# Patient Record
Sex: FEMALE | Race: BLACK OR AFRICAN AMERICAN | NOT HISPANIC OR LATINO | URBAN - METROPOLITAN AREA
[De-identification: names, ages, dates, MRNs, and addresses within clinical notes are randomized per-mention and may not be internally consistent; named-entity substitution may affect disease eponyms.]

---

## 2017-03-01 ENCOUNTER — INPATIENT (INPATIENT)
Facility: HOSPITAL | Age: 82
LOS: 2 days | Discharge: HOME | End: 2017-03-04
Attending: HOSPITALIST | Admitting: HOSPITALIST
Payer: MEDICARE

## 2017-03-01 VITALS
TEMPERATURE: 98 F | SYSTOLIC BLOOD PRESSURE: 151 MMHG | DIASTOLIC BLOOD PRESSURE: 70 MMHG | OXYGEN SATURATION: 99 % | HEART RATE: 54 BPM | RESPIRATION RATE: 18 BRPM

## 2017-03-01 DIAGNOSIS — R53.1 WEAKNESS: ICD-10-CM

## 2017-03-01 DIAGNOSIS — F03.90 UNSPECIFIED DEMENTIA, UNSPECIFIED SEVERITY, WITHOUT BEHAVIORAL DISTURBANCE, PSYCHOTIC DISTURBANCE, MOOD DISTURBANCE, AND ANXIETY: ICD-10-CM

## 2017-03-01 DIAGNOSIS — I63.9 CEREBRAL INFARCTION, UNSPECIFIED: ICD-10-CM

## 2017-03-01 DIAGNOSIS — Z29.9 ENCOUNTER FOR PROPHYLACTIC MEASURES, UNSPECIFIED: ICD-10-CM

## 2017-03-01 DIAGNOSIS — I10 ESSENTIAL (PRIMARY) HYPERTENSION: ICD-10-CM

## 2017-03-01 LAB
ALBUMIN SERPL ELPH-MCNC: 4.1 G/DL — SIGNIFICANT CHANGE UP (ref 3.3–5)
ALP SERPL-CCNC: 69 U/L — SIGNIFICANT CHANGE UP (ref 40–120)
ALT FLD-CCNC: 20 U/L — SIGNIFICANT CHANGE UP (ref 4–33)
APTT BLD: 32.7 SEC — SIGNIFICANT CHANGE UP (ref 27.5–37.4)
AST SERPL-CCNC: 36 U/L — HIGH (ref 4–32)
BASOPHILS # BLD AUTO: 0.02 K/UL — SIGNIFICANT CHANGE UP (ref 0–0.2)
BASOPHILS NFR BLD AUTO: 0.3 % — SIGNIFICANT CHANGE UP (ref 0–2)
BILIRUB SERPL-MCNC: 0.2 MG/DL — SIGNIFICANT CHANGE UP (ref 0.2–1.2)
BUN SERPL-MCNC: 10 MG/DL — SIGNIFICANT CHANGE UP (ref 7–23)
CALCIUM SERPL-MCNC: 9.3 MG/DL — SIGNIFICANT CHANGE UP (ref 8.4–10.5)
CHLORIDE SERPL-SCNC: 101 MMOL/L — SIGNIFICANT CHANGE UP (ref 98–107)
CK MB BLD-MCNC: 1.97 NG/ML — SIGNIFICANT CHANGE UP (ref 1–4.7)
CK SERPL-CCNC: 112 U/L — SIGNIFICANT CHANGE UP (ref 25–170)
CO2 SERPL-SCNC: 29 MMOL/L — SIGNIFICANT CHANGE UP (ref 22–31)
CREAT SERPL-MCNC: 0.62 MG/DL — SIGNIFICANT CHANGE UP (ref 0.5–1.3)
EOSINOPHIL # BLD AUTO: 0.14 K/UL — SIGNIFICANT CHANGE UP (ref 0–0.5)
EOSINOPHIL NFR BLD AUTO: 2.4 % — SIGNIFICANT CHANGE UP (ref 0–6)
GLUCOSE SERPL-MCNC: 94 MG/DL — SIGNIFICANT CHANGE UP (ref 70–99)
HCT VFR BLD CALC: 37.2 % — SIGNIFICANT CHANGE UP (ref 34.5–45)
HGB BLD-MCNC: 12.2 G/DL — SIGNIFICANT CHANGE UP (ref 11.5–15.5)
IMM GRANULOCYTES NFR BLD AUTO: 0.2 % — SIGNIFICANT CHANGE UP (ref 0–1.5)
INR BLD: 1.1 — SIGNIFICANT CHANGE UP (ref 0.87–1.18)
LYMPHOCYTES # BLD AUTO: 1.24 K/UL — SIGNIFICANT CHANGE UP (ref 1–3.3)
LYMPHOCYTES # BLD AUTO: 21.4 % — SIGNIFICANT CHANGE UP (ref 13–44)
MCHC RBC-ENTMCNC: 24.6 PG — LOW (ref 27–34)
MCHC RBC-ENTMCNC: 32.8 % — SIGNIFICANT CHANGE UP (ref 32–36)
MCV RBC AUTO: 75.2 FL — LOW (ref 80–100)
MONOCYTES # BLD AUTO: 0.33 K/UL — SIGNIFICANT CHANGE UP (ref 0–0.9)
MONOCYTES NFR BLD AUTO: 5.7 % — SIGNIFICANT CHANGE UP (ref 2–14)
NEUTROPHILS # BLD AUTO: 4.06 K/UL — SIGNIFICANT CHANGE UP (ref 1.8–7.4)
NEUTROPHILS NFR BLD AUTO: 70 % — SIGNIFICANT CHANGE UP (ref 43–77)
PLATELET # BLD AUTO: 163 K/UL — SIGNIFICANT CHANGE UP (ref 150–400)
PMV BLD: SIGNIFICANT CHANGE UP FL (ref 7–13)
POTASSIUM SERPL-MCNC: 5.4 MMOL/L — HIGH (ref 3.5–5.3)
POTASSIUM SERPL-SCNC: 5.4 MMOL/L — HIGH (ref 3.5–5.3)
PROT SERPL-MCNC: 7.7 G/DL — SIGNIFICANT CHANGE UP (ref 6–8.3)
PROTHROM AB SERPL-ACNC: 12.5 SEC — SIGNIFICANT CHANGE UP (ref 10–13.1)
RBC # BLD: 4.95 M/UL — SIGNIFICANT CHANGE UP (ref 3.8–5.2)
RBC # FLD: 17.7 % — HIGH (ref 10.3–14.5)
SODIUM SERPL-SCNC: 142 MMOL/L — SIGNIFICANT CHANGE UP (ref 135–145)
TROPONIN T SERPL-MCNC: < 0.06 NG/ML — SIGNIFICANT CHANGE UP (ref 0–0.06)
WBC # BLD: 5.8 K/UL — SIGNIFICANT CHANGE UP (ref 3.8–10.5)
WBC # FLD AUTO: 5.8 K/UL — SIGNIFICANT CHANGE UP (ref 3.8–10.5)

## 2017-03-01 PROCEDURE — 70450 CT HEAD/BRAIN W/O DYE: CPT | Mod: 26

## 2017-03-01 PROCEDURE — 71020: CPT | Mod: 26

## 2017-03-01 RX ORDER — MEMANTINE HYDROCHLORIDE 10 MG/1
10 TABLET ORAL DAILY
Qty: 0 | Refills: 0 | Status: DISCONTINUED | OUTPATIENT
Start: 2017-03-01 | End: 2017-03-04

## 2017-03-01 RX ORDER — ENOXAPARIN SODIUM 100 MG/ML
40 INJECTION SUBCUTANEOUS EVERY 24 HOURS
Qty: 0 | Refills: 0 | Status: DISCONTINUED | OUTPATIENT
Start: 2017-03-01 | End: 2017-03-04

## 2017-03-01 RX ORDER — PANTOPRAZOLE SODIUM 20 MG/1
40 TABLET, DELAYED RELEASE ORAL
Qty: 0 | Refills: 0 | Status: DISCONTINUED | OUTPATIENT
Start: 2017-03-01 | End: 2017-03-04

## 2017-03-01 RX ORDER — ACETAMINOPHEN 500 MG
650 TABLET ORAL EVERY 6 HOURS
Qty: 0 | Refills: 0 | Status: DISCONTINUED | OUTPATIENT
Start: 2017-03-01 | End: 2017-03-04

## 2017-03-01 RX ORDER — ASPIRIN/CALCIUM CARB/MAGNESIUM 324 MG
81 TABLET ORAL DAILY
Qty: 0 | Refills: 0 | Status: DISCONTINUED | OUTPATIENT
Start: 2017-03-01 | End: 2017-03-04

## 2017-03-01 RX ORDER — METOPROLOL TARTRATE 50 MG
100 TABLET ORAL DAILY
Qty: 0 | Refills: 0 | Status: DISCONTINUED | OUTPATIENT
Start: 2017-03-01 | End: 2017-03-04

## 2017-03-01 RX ORDER — FOLIC ACID 0.8 MG
0.4 TABLET ORAL DAILY
Qty: 0 | Refills: 0 | Status: DISCONTINUED | OUTPATIENT
Start: 2017-03-01 | End: 2017-03-04

## 2017-03-01 RX ORDER — UBIDECARENONE 100 MG
0 CAPSULE ORAL
Qty: 0 | Refills: 0 | COMMUNITY

## 2017-03-01 RX ORDER — ATORVASTATIN CALCIUM 80 MG/1
40 TABLET, FILM COATED ORAL AT BEDTIME
Qty: 0 | Refills: 0 | Status: DISCONTINUED | OUTPATIENT
Start: 2017-03-01 | End: 2017-03-04

## 2017-03-01 RX ORDER — LOSARTAN POTASSIUM 100 MG/1
50 TABLET, FILM COATED ORAL DAILY
Qty: 0 | Refills: 0 | Status: DISCONTINUED | OUTPATIENT
Start: 2017-03-01 | End: 2017-03-04

## 2017-03-01 RX ORDER — ESCITALOPRAM OXALATE 10 MG/1
10 TABLET, FILM COATED ORAL DAILY
Qty: 0 | Refills: 0 | Status: DISCONTINUED | OUTPATIENT
Start: 2017-03-01 | End: 2017-03-04

## 2017-03-01 RX ORDER — OXYBUTYNIN CHLORIDE 5 MG
5 TABLET ORAL DAILY
Qty: 0 | Refills: 0 | Status: DISCONTINUED | OUTPATIENT
Start: 2017-03-01 | End: 2017-03-04

## 2017-03-01 RX ORDER — SODIUM CHLORIDE 9 MG/ML
1000 INJECTION INTRAMUSCULAR; INTRAVENOUS; SUBCUTANEOUS ONCE
Qty: 0 | Refills: 0 | Status: COMPLETED | OUTPATIENT
Start: 2017-03-01 | End: 2017-03-01

## 2017-03-01 RX ADMIN — Medication 650 MILLIGRAM(S): at 23:04

## 2017-03-01 RX ADMIN — ATORVASTATIN CALCIUM 40 MILLIGRAM(S): 80 TABLET, FILM COATED ORAL at 23:02

## 2017-03-01 RX ADMIN — SODIUM CHLORIDE 1000 MILLILITER(S): 9 INJECTION INTRAMUSCULAR; INTRAVENOUS; SUBCUTANEOUS at 16:06

## 2017-03-01 RX ADMIN — LOSARTAN POTASSIUM 50 MILLIGRAM(S): 100 TABLET, FILM COATED ORAL at 23:04

## 2017-03-01 NOTE — ED PROVIDER NOTE - OBJECTIVE STATEMENT
82F with hx of HTN presents with generalized weakness. Pt woke up this morning feeling weak to get out of bed. Pt also felt lightheaded when she walked. Denies chest pain, short of breath, any focal deficits. Pt did not take her BP meds for the past few weeks because she was living in different houses; her  passed away recently and pt is visiting from [place] for the  arrangements. 82F with hx of HTN presents with generalized weakness 8 hours ago. Pt woke up this morning feeling weak to get out of bed. Pt also felt lightheaded when she walked. Denies chest pain, short of breath, any focal deficits. Pt did not take her BP meds for the past few weeks because she was living in different houses; her  passed away recently and pt is visiting from [place] for the  arrangements. 82F with hx of HTN presents with generalized weakness 8 hours ago. Pt woke up this morning feeling weak to get out of bed. Pt also felt lightheaded when she walked. Denies chest pain, short of breath, any focal deficits. Pt did not take her BP meds for the past few weeks because she was living in different houses; her  passed away recently and pt is visiting from [place] for the  arrangements.    Attendinyo female presents because this AM she felt dizzy.  Pt states that she was having difficulty walking this AM and felt unsteady on her feet.  Associated with diaphoresis.  Symptoms are getting better, but still felt unsteady when she walked to the bathroom here. 82F with hx of HTN presents with generalized weakness 8 hours ago. Pt woke up this morning feeling weak to get out of bed. Pt also felt lightheaded when she walked. Denies chest pain, short of breath, any focal deficits. Pt did not take her BP meds for the past few weeks because she was living in different houses; her  passed away recently and pt is visiting from [place] for the  arrangements..    Attendinyo female presents because this AM she felt dizzy.  Pt states that she was having difficulty walking this AM and felt unsteady on her feet.  Associated with diaphoresis.  Symptoms are getting better, but still felt unsteady when she walked to the bathroom here.

## 2017-03-01 NOTE — H&P ADULT. - HISTORY OF PRESENT ILLNESS
Self ambulating 82F with a history of L Breast Ca s/p mastectomy 2000, HTN, recently , staying in [place] with her son, non complaint with her medications for the past few weeks, went sleep fine on 2/28 and awoke @ 730am on 3/1 with dizziness, generalized weakness, Occipital HA, blurred vison, nausea. She got out of bed, did not feel steady on her feet and felt like she will fall. She did not fall or have a LOC, no vomit, chest pain, palpitations, SOB, diap[horesis, chills. She called her son. He called his wife and the wife went to the pt house and found the pt laying, awake in bed. The pt still felt dizzy. She was help up went to the bathroom, urinated, got dressed and was taken tot Self ambulating 82F with a history of L Breast Ca s/p mastectomy 2000, HTN, recently , staying in [place] with her son, non complaint with her medications for the past few weeks, went sleep fine on 2/28 and awoke @ 730am on 3/1 with dizziness, generalized weakness, Occipital HA, blurred vison, nausea. She got out of bed, did not feel steady on her feet and felt like she will fall. She did not fall or have a LOC, no vomit, chest pain, palpitations, SOB, diap[horesis, chills. She called her son. He called his wife and the wife went to the pt house and found the pt laying, awake in bed. The pt still felt dizzy. She was help up went to the bathroom, urinated, got dressed and was taken to the Ed.     In the ED, she had a CTH= No mass effect, hemorrhage or evidence of acute intracranial pathology. Self ambulating 82F with a history of L Breast Ca s/p mastectomy 2000, HTN, recently , staying in [place] with her son, non complaint with her medications for the past few weeks, went sleep fine on 2/28 and awoke @ 730am on 3/1 with dizziness, generalized weakness, Occipital HA, blurred vison, nausea. She got out of bed, did not feel steady on her feet and felt like she will fall. She did not fall or have a LOC, no vomit, chest pain, palpitations, SOB, diap[horesis, chills. She called her son. He called his wife and the wife went to the pt house and found the pt laying, awake in bed. The pt still felt dizzy. She was help up went to the bathroom, urinated, got dressed and was taken to the Ed.     In the ED, she had a CTH= No mass effect, hemorrhage or evidence of acute intracranial pathology.  She was seen by the neuro resident. Their consult and recommendations are  in the chart.   The pt symptoms have improved.  But she still with dizziness, generalized weakness, Occipital HA, blurred vision  Vitals =  / 91, HR = 60b/ min, RR= 16b/ min, SPO2= 98% ra, T= 98* 82-year-old female with HTN, recently , staying in [place] with her son, non complaint with her medications for the past few weeks, went to sleep 2/28 and awoke @ 730am on 3/1 with dizziness, generalized weakness, holocranial HA, blurred vison, nausea and diaphoresis. She got out of bed, did not feel steady on her feet and felt like she will fall. She did not fall or have a LOC, no vomiting, chest pain, palpitations, SOB or chills. She called her son. He called his wife and the wife went to the pt house and found the pt laying, awake in bed. The pt still felt dizzy. She was help up went to the bathroom, urinated, got dressed and was taken to the Ed.     In the ED, she had a CTH= No mass effect, hemorrhage or evidence of acute intracranial pathology.  She was seen by the neuro resident. Their consult and recommendations are  in the chart.   The pt symptoms have improved.  But she still with dizziness, generalized weakness, Occipital HA, blurred vision  Vitals =  / 91, HR = 60b/ min, RR= 16b/ min, SPO2= 98% ra, T= 98*

## 2017-03-01 NOTE — H&P ADULT. - FAMILY HISTORY
No pertinent family history in first degree relatives Mother  Still living? Unknown  Family history of diabetes mellitus (DM), Age at diagnosis: Age Unknown     Sibling  Still living? Unknown  Family history of diabetes mellitus (DM), Age at diagnosis: Age Unknown

## 2017-03-01 NOTE — ED ADULT TRIAGE NOTE - CHIEF COMPLAINT QUOTE
Pt states she woke up this morning feeling weak, with body aches, and profuse diaphoresis. Pt denies SOB, denies pain at this time, states the pain was in chest as well as rest of body this morning. Pt denies fever, denies recent illness. Pt here visiting from [place], didn't have her meds with her and didn't take them for a week, on meds for HTN.

## 2017-03-01 NOTE — H&P ADULT. - ATTENDING COMMENTS
Briefly, 82-year-old female with acromegaly, HTN, recent medication non-adherence for approx. 7 days, 1 week ago presenting from her son's house with dizziness, unsteady gait, lightheadedness, with associated chest pain and diaphoresis.  Patient currently without chest pain, still feeling dizzy upon standing. Will monitor on tele, check orthostatics, obtain MR imaging per neuro recommendations.  Clarify meds with family in AM as patient could not recall her meds.  Check TSH, lipid panel, A1c with AM labs.

## 2017-03-01 NOTE — H&P ADULT. - RADIOLOGY RESULTS AND INTERPRETATION
CT Head without acute path, 1cm meningioma   CXR clear lungs, elevated left hemidiaphragm, surgical clips in left axilla, tracheal deviation to the right

## 2017-03-01 NOTE — H&P ADULT. - GASTROINTESTINAL DETAILS
nontender/soft/no masses palpable nontender/no rigidity/no guarding/normal/soft/no masses palpable/bowel sounds normal

## 2017-03-01 NOTE — H&P ADULT. - ASSESSMENT
82F admitted for generalized weakness, HTN urgency r/o Stroke 82-year-old female with HTN, admitted for generalized weakness thought to be secondary to HTN urgency vs Stroke

## 2017-03-01 NOTE — ED PROVIDER NOTE - PHYSICAL EXAMINATION
CN2-12 intact. Slight droop lips on L. CN2-12 intact. Slight droop lips on L. No focal deficits or weakness.

## 2017-03-01 NOTE — H&P ADULT. - PMH
Dementia without behavioral disturbance, unspecified dementia type    HTN (hypertension) Acromegaly    Dementia without behavioral disturbance, unspecified dementia type    HTN (hypertension)

## 2017-03-01 NOTE — ED ADULT NURSE NOTE - OBJECTIVE STATEMENT
Pt received to rm 7, a&ox3, c/o generalized weakness, dizziness, with short episode of R sided chest pain at 730am this morning that resolved on own, denies SOB. Pt states, "I think it's because my   2 weeks ago." Denies pain or discomfort at present, respirations even and unlabored, sinus bradycardia on cm. Labs drawn and sent, IVL placed. Son/HCP by bedside. Awaiting MD beaver, pt in NAD at present.

## 2017-03-01 NOTE — H&P ADULT. - LAB RESULTS AND INTERPRETATION
Labs personally reviewed  UA negative  CE (-)x1  coags wnl  hyperkalemia in setting of hemolyzed specimen  microcytosis without anemia  mild transaminitis

## 2017-03-01 NOTE — H&P ADULT. - NEGATIVE ENMT SYMPTOMS
no nose bleeds/no nasal congestion/no recurrent cold sores/no tinnitus/no gum bleeding/no sinus symptoms/no abnormal taste sensation no sinus symptoms/no tinnitus/no hearing difficulty/no dysphagia/no nasal congestion

## 2017-03-01 NOTE — H&P ADULT. - MS EXT PE MLT D E PC
no pedal edema/no cyanosis/no clubbing no pedal edema/no cyanosis/prominent doughy hands/feet/no clubbing

## 2017-03-01 NOTE — H&P ADULT. - RS GEN PE MLT RESP DETAILS PC
good air movement/airway patent/respirations non-labored/breath sounds equal no rhonchi/no wheezes/normal/good air movement/respirations non-labored/breath sounds equal/airway patent/no rales/no intercostal retractions/clear to auscultation bilaterally

## 2017-03-01 NOTE — H&P ADULT. - PROBLEM SELECTOR PLAN 1
CM  F/U CE, FLP, A1C, TSH  F/U EKG, TTE Bubble study, MRI B, MRA H & N  F/U PT, OT, PM& R  Fall, Aspiration and Seizure precautions   Continue ASA and Plavix repeat set of CE  F/U CE, FLP, A1C, TSH  F/U TTE Bubble study, MRI B, MRA H & N  check orthostatics x1  F/U PT, OT, PM& R  Fall, Aspiration and Seizure precautions   Continue ASA

## 2017-03-01 NOTE — H&P ADULT. - NEGATIVE CARDIOVASCULAR SYMPTOMS
no chest pain/no orthopnea/no dyspnea on exertion/no palpitations no dyspnea on exertion/no chest pain/no peripheral edema/no palpitations/no orthopnea

## 2017-03-01 NOTE — H&P ADULT. - PSH
No significant past surgical history H/O small bowel obstruction    H/O total hysterectomy    History of left mastectomy

## 2017-03-02 DIAGNOSIS — Z87.19 PERSONAL HISTORY OF OTHER DISEASES OF THE DIGESTIVE SYSTEM: Chronic | ICD-10-CM

## 2017-03-02 DIAGNOSIS — Z90.12 ACQUIRED ABSENCE OF LEFT BREAST AND NIPPLE: Chronic | ICD-10-CM

## 2017-03-02 DIAGNOSIS — Z90.710 ACQUIRED ABSENCE OF BOTH CERVIX AND UTERUS: Chronic | ICD-10-CM

## 2017-03-02 LAB
APPEARANCE UR: CLEAR — SIGNIFICANT CHANGE UP
B PERT DNA SPEC QL NAA+PROBE: SIGNIFICANT CHANGE UP
BACTERIA # UR AUTO: HIGH
BILIRUB UR-MCNC: NEGATIVE — SIGNIFICANT CHANGE UP
BLOOD UR QL VISUAL: NEGATIVE — SIGNIFICANT CHANGE UP
BUN SERPL-MCNC: 9 MG/DL — SIGNIFICANT CHANGE UP (ref 7–23)
C PNEUM DNA SPEC QL NAA+PROBE: NOT DETECTED — SIGNIFICANT CHANGE UP
CALCIUM SERPL-MCNC: 9.5 MG/DL — SIGNIFICANT CHANGE UP (ref 8.4–10.5)
CHLORIDE SERPL-SCNC: 102 MMOL/L — SIGNIFICANT CHANGE UP (ref 98–107)
CHOLEST SERPL-MCNC: 181 MG/DL — SIGNIFICANT CHANGE UP (ref 120–199)
CK SERPL-CCNC: 62 U/L — SIGNIFICANT CHANGE UP (ref 25–170)
CO2 SERPL-SCNC: 32 MMOL/L — HIGH (ref 22–31)
COLOR SPEC: SIGNIFICANT CHANGE UP
CREAT SERPL-MCNC: 0.49 MG/DL — LOW (ref 0.5–1.3)
FLUAV H1 2009 PAND RNA SPEC QL NAA+PROBE: NOT DETECTED — SIGNIFICANT CHANGE UP
FLUAV H1 RNA SPEC QL NAA+PROBE: NOT DETECTED — SIGNIFICANT CHANGE UP
FLUAV H3 RNA SPEC QL NAA+PROBE: NOT DETECTED — SIGNIFICANT CHANGE UP
FLUAV SUBTYP SPEC NAA+PROBE: SIGNIFICANT CHANGE UP
FLUBV RNA SPEC QL NAA+PROBE: NOT DETECTED — SIGNIFICANT CHANGE UP
GLUCOSE SERPL-MCNC: 98 MG/DL — SIGNIFICANT CHANGE UP (ref 70–99)
GLUCOSE UR-MCNC: NEGATIVE — SIGNIFICANT CHANGE UP
HADV DNA SPEC QL NAA+PROBE: NOT DETECTED — SIGNIFICANT CHANGE UP
HBA1C BLD-MCNC: 5.3 % — SIGNIFICANT CHANGE UP (ref 4–5.6)
HCOV 229E RNA SPEC QL NAA+PROBE: NOT DETECTED — SIGNIFICANT CHANGE UP
HCOV HKU1 RNA SPEC QL NAA+PROBE: NOT DETECTED — SIGNIFICANT CHANGE UP
HCOV NL63 RNA SPEC QL NAA+PROBE: NOT DETECTED — SIGNIFICANT CHANGE UP
HCOV OC43 RNA SPEC QL NAA+PROBE: NOT DETECTED — SIGNIFICANT CHANGE UP
HCT VFR BLD CALC: 34.5 % — SIGNIFICANT CHANGE UP (ref 34.5–45)
HDLC SERPL-MCNC: 88 MG/DL — HIGH (ref 45–65)
HGB BLD-MCNC: 11.6 G/DL — SIGNIFICANT CHANGE UP (ref 11.5–15.5)
HMPV RNA SPEC QL NAA+PROBE: NOT DETECTED — SIGNIFICANT CHANGE UP
HPIV1 RNA SPEC QL NAA+PROBE: NOT DETECTED — SIGNIFICANT CHANGE UP
HPIV2 RNA SPEC QL NAA+PROBE: NOT DETECTED — SIGNIFICANT CHANGE UP
HPIV3 RNA SPEC QL NAA+PROBE: NOT DETECTED — SIGNIFICANT CHANGE UP
HPIV4 RNA SPEC QL NAA+PROBE: NOT DETECTED — SIGNIFICANT CHANGE UP
KETONES UR-MCNC: NEGATIVE — SIGNIFICANT CHANGE UP
LEUKOCYTE ESTERASE UR-ACNC: NEGATIVE — SIGNIFICANT CHANGE UP
LIPID PNL WITH DIRECT LDL SERPL: 90 MG/DL — SIGNIFICANT CHANGE UP
M PNEUMO DNA SPEC QL NAA+PROBE: NOT DETECTED — SIGNIFICANT CHANGE UP
MCHC RBC-ENTMCNC: 25.2 PG — LOW (ref 27–34)
MCHC RBC-ENTMCNC: 33.6 % — SIGNIFICANT CHANGE UP (ref 32–36)
MCV RBC AUTO: 75 FL — LOW (ref 80–100)
MUCOUS THREADS # UR AUTO: SIGNIFICANT CHANGE UP
NITRITE UR-MCNC: NEGATIVE — SIGNIFICANT CHANGE UP
PH UR: 7.5 — SIGNIFICANT CHANGE UP (ref 4.6–8)
PLATELET # BLD AUTO: 160 K/UL — SIGNIFICANT CHANGE UP (ref 150–400)
PMV BLD: SIGNIFICANT CHANGE UP FL (ref 7–13)
POTASSIUM SERPL-MCNC: 3.9 MMOL/L — SIGNIFICANT CHANGE UP (ref 3.5–5.3)
POTASSIUM SERPL-SCNC: 3.9 MMOL/L — SIGNIFICANT CHANGE UP (ref 3.5–5.3)
PROT UR-MCNC: NEGATIVE — SIGNIFICANT CHANGE UP
RBC # BLD: 4.6 M/UL — SIGNIFICANT CHANGE UP (ref 3.8–5.2)
RBC # FLD: 17.7 % — HIGH (ref 10.3–14.5)
RBC CASTS # UR COMP ASSIST: SIGNIFICANT CHANGE UP (ref 0–?)
RSV RNA SPEC QL NAA+PROBE: NOT DETECTED — SIGNIFICANT CHANGE UP
RV+EV RNA SPEC QL NAA+PROBE: NOT DETECTED — SIGNIFICANT CHANGE UP
SODIUM SERPL-SCNC: 143 MMOL/L — SIGNIFICANT CHANGE UP (ref 135–145)
SP GR SPEC: 1.01 — SIGNIFICANT CHANGE UP (ref 1–1.03)
SQUAMOUS # UR AUTO: SIGNIFICANT CHANGE UP
TRIGL SERPL-MCNC: 47 MG/DL — SIGNIFICANT CHANGE UP (ref 10–149)
TROPONIN T SERPL-MCNC: < 0.06 NG/ML — SIGNIFICANT CHANGE UP (ref 0–0.06)
TSH SERPL-MCNC: 0.28 UIU/ML — SIGNIFICANT CHANGE UP (ref 0.27–4.2)
UROBILINOGEN FLD QL: NORMAL E.U. — SIGNIFICANT CHANGE UP (ref 0.1–0.2)
WBC # BLD: 6.01 K/UL — SIGNIFICANT CHANGE UP (ref 3.8–10.5)
WBC # FLD AUTO: 6.01 K/UL — SIGNIFICANT CHANGE UP (ref 3.8–10.5)
WBC UR QL: SIGNIFICANT CHANGE UP (ref 0–?)

## 2017-03-02 PROCEDURE — 93306 TTE W/DOPPLER COMPLETE: CPT | Mod: 26

## 2017-03-02 PROCEDURE — 99223 1ST HOSP IP/OBS HIGH 75: CPT

## 2017-03-02 RX ADMIN — Medication 650 MILLIGRAM(S): at 00:00

## 2017-03-02 RX ADMIN — PANTOPRAZOLE SODIUM 40 MILLIGRAM(S): 20 TABLET, DELAYED RELEASE ORAL at 06:22

## 2017-03-02 RX ADMIN — MEMANTINE HYDROCHLORIDE 10 MILLIGRAM(S): 10 TABLET ORAL at 12:59

## 2017-03-02 RX ADMIN — Medication 5 MILLIGRAM(S): at 12:59

## 2017-03-02 RX ADMIN — Medication 81 MILLIGRAM(S): at 12:58

## 2017-03-02 RX ADMIN — Medication 100 MILLIGRAM(S): at 06:22

## 2017-03-02 RX ADMIN — Medication 0.4 MILLIGRAM(S): at 12:58

## 2017-03-02 RX ADMIN — ESCITALOPRAM OXALATE 10 MILLIGRAM(S): 10 TABLET, FILM COATED ORAL at 12:58

## 2017-03-02 RX ADMIN — ENOXAPARIN SODIUM 40 MILLIGRAM(S): 100 INJECTION SUBCUTANEOUS at 00:00

## 2017-03-02 RX ADMIN — LOSARTAN POTASSIUM 50 MILLIGRAM(S): 100 TABLET, FILM COATED ORAL at 06:23

## 2017-03-02 RX ADMIN — ATORVASTATIN CALCIUM 40 MILLIGRAM(S): 80 TABLET, FILM COATED ORAL at 21:03

## 2017-03-03 LAB
BUN SERPL-MCNC: 13 MG/DL — SIGNIFICANT CHANGE UP (ref 7–23)
CALCIUM SERPL-MCNC: 9.5 MG/DL — SIGNIFICANT CHANGE UP (ref 8.4–10.5)
CHLORIDE SERPL-SCNC: 102 MMOL/L — SIGNIFICANT CHANGE UP (ref 98–107)
CO2 SERPL-SCNC: 27 MMOL/L — SIGNIFICANT CHANGE UP (ref 22–31)
CREAT SERPL-MCNC: 0.58 MG/DL — SIGNIFICANT CHANGE UP (ref 0.5–1.3)
GLUCOSE SERPL-MCNC: 87 MG/DL — SIGNIFICANT CHANGE UP (ref 70–99)
HCT VFR BLD CALC: 34.8 % — SIGNIFICANT CHANGE UP (ref 34.5–45)
HGB BLD-MCNC: 11.7 G/DL — SIGNIFICANT CHANGE UP (ref 11.5–15.5)
MCHC RBC-ENTMCNC: 25 PG — LOW (ref 27–34)
MCHC RBC-ENTMCNC: 33.6 % — SIGNIFICANT CHANGE UP (ref 32–36)
MCV RBC AUTO: 74.4 FL — LOW (ref 80–100)
PLATELET # BLD AUTO: 166 K/UL — SIGNIFICANT CHANGE UP (ref 150–400)
PMV BLD: SIGNIFICANT CHANGE UP FL (ref 7–13)
POTASSIUM SERPL-MCNC: 3.8 MMOL/L — SIGNIFICANT CHANGE UP (ref 3.5–5.3)
POTASSIUM SERPL-SCNC: 3.8 MMOL/L — SIGNIFICANT CHANGE UP (ref 3.5–5.3)
RBC # BLD: 4.68 M/UL — SIGNIFICANT CHANGE UP (ref 3.8–5.2)
RBC # FLD: 17.6 % — HIGH (ref 10.3–14.5)
SODIUM SERPL-SCNC: 142 MMOL/L — SIGNIFICANT CHANGE UP (ref 135–145)
WBC # BLD: 5.44 K/UL — SIGNIFICANT CHANGE UP (ref 3.8–10.5)
WBC # FLD AUTO: 5.44 K/UL — SIGNIFICANT CHANGE UP (ref 3.8–10.5)

## 2017-03-03 PROCEDURE — 99233 SBSQ HOSP IP/OBS HIGH 50: CPT

## 2017-03-03 PROCEDURE — 99232 SBSQ HOSP IP/OBS MODERATE 35: CPT

## 2017-03-03 PROCEDURE — 70551 MRI BRAIN STEM W/O DYE: CPT | Mod: 26

## 2017-03-03 PROCEDURE — 70548 MR ANGIOGRAPHY NECK W/DYE: CPT | Mod: 26

## 2017-03-03 RX ADMIN — Medication 81 MILLIGRAM(S): at 13:29

## 2017-03-03 RX ADMIN — ATORVASTATIN CALCIUM 40 MILLIGRAM(S): 80 TABLET, FILM COATED ORAL at 23:41

## 2017-03-03 RX ADMIN — Medication 5 MILLIGRAM(S): at 13:29

## 2017-03-03 RX ADMIN — Medication 100 MILLIGRAM(S): at 06:11

## 2017-03-03 RX ADMIN — ENOXAPARIN SODIUM 40 MILLIGRAM(S): 100 INJECTION SUBCUTANEOUS at 23:41

## 2017-03-03 RX ADMIN — LOSARTAN POTASSIUM 50 MILLIGRAM(S): 100 TABLET, FILM COATED ORAL at 06:11

## 2017-03-03 RX ADMIN — PANTOPRAZOLE SODIUM 40 MILLIGRAM(S): 20 TABLET, DELAYED RELEASE ORAL at 06:11

## 2017-03-03 RX ADMIN — MEMANTINE HYDROCHLORIDE 10 MILLIGRAM(S): 10 TABLET ORAL at 13:29

## 2017-03-03 RX ADMIN — ENOXAPARIN SODIUM 40 MILLIGRAM(S): 100 INJECTION SUBCUTANEOUS at 00:00

## 2017-03-03 RX ADMIN — ESCITALOPRAM OXALATE 10 MILLIGRAM(S): 10 TABLET, FILM COATED ORAL at 13:29

## 2017-03-03 RX ADMIN — Medication 0.4 MILLIGRAM(S): at 13:29

## 2017-03-03 NOTE — OCCUPATIONAL THERAPY INITIAL EVALUATION ADULT - LIVES WITH, PROFILE
alone/Pt lives in [place] in house with no steps to manage. Bathroom includes stall shower with grab bars/shower chair. Pt currently stayin with son in [place] since 's death.  Per pt, son's home has steps to manage and has bathtub within bathroom.

## 2017-03-03 NOTE — DISCHARGE NOTE ADULT - DURABLE MEDICAL EQUIPMENT AGENCY
[facility] [phone] delivered rolling walker to bedside. Please make sure patient takes home with her.

## 2017-03-03 NOTE — DISCHARGE NOTE ADULT - CARE PLAN
Principal Discharge DX:	Essential hypertension  Goal:	maintain normal blood pressure  Instructions for follow-up, activity and diet:	Continue current meds. Low sodium diet. Monitor blood pressure at home daily and follow up with your medical doctor in 1 week. Follow up with Dr. Contreras in 2 weeks.  Secondary Diagnosis:	Dementia without behavioral disturbance, unspecified dementia type  Goal:	Continue current meds, maintain safe environment  Instructions for follow-up, activity and diet:	Continue current meds. Follow up with your medical doctor.

## 2017-03-03 NOTE — DISCHARGE NOTE ADULT - CARE PROVIDER_API CALL
[facility] or your medical doctor,   Please call for appointment.  Phone: [phone]  Fax: [phone]   )    -    Isaiah Contreras (MD; PhD), Cardiology; Internal Medicine; Vascular Medicine  [id number] [address] [place] [id number]  Phone: [phone]  Fax: [phone]

## 2017-03-03 NOTE — DISCHARGE NOTE ADULT - PROVIDER TOKENS
FREE:[LAST:[[facility] or your medical doctor],PHONE:[phone]],FAX:[(   [phone]    -],ADDRESS:[Please call for appointment.]],TOKEN:'[id number]'

## 2017-03-03 NOTE — DISCHARGE NOTE ADULT - MEDICATION SUMMARY - MEDICATIONS TO TAKE
I will START or STAY ON the medications listed below when I get home from the hospital:    Glucosamine (1500 mg) Plus MSM (1500 mg) with Hyaluronic Acid  -- 2 tab(s) by mouth once a day  -- Indication: For Arthriis    aspirin 81 mg oral delayed release tablet  -- 1 tab(s) by mouth once a day  -- Indication: For Cad    irbesartan 150 mg oral tablet  -- 1 tab(s) by mouth once a day  -- Indication: For HTN (hypertension)    escitalopram 10 mg oral tablet  -- 1 tab(s) by mouth once a day  -- Indication: For mood    rosuvastatin 10 mg oral tablet  -- 1 tab(s) by mouth once a day (at bedtime)  -- Indication: For Hld    metoprolol succinate 100 mg oral tablet, extended release  -- 1 tab(s) by mouth once a day  -- Indication: For HTN (hypertension)    memantine 10 mg oral tablet  -- 1 tab(s) by mouth once a day  -- Indication: For Dementia without behavioral disturbance, unspecified dementia type    red yeast rice 600 mg oral capsule  -- 1 cap(s) by mouth once a day  -- Indication: For suppelement    Co-Q10 100 mg oral capsule  -- 1 cap(s) by mouth once a day  -- Indication: For supplement    esomeprazole 40 mg oral delayed release capsule  -- 1 cap(s) by mouth once a day  -- Indication: For gerd    Toviaz 4 mg oral tablet, extended release  -- 1 tab(s) by mouth once a day  -- Indication: For incontinence    folic acid 0.4 mg oral tablet  -- 1 tab(s) by mouth once a day  -- Indication: For Anemia

## 2017-03-03 NOTE — OCCUPATIONAL THERAPY INITIAL EVALUATION ADULT - MD ORDER
Occupational Therapy to evaluate and treat. Occupational Therapy to evaluate and treat.  OOB with assistance

## 2017-03-03 NOTE — DISCHARGE NOTE ADULT - PATIENT PORTAL LINK FT
“You can access the Follow[facility] Patient Portal, offered by [facility], by registering with the following website: [email or web address]”

## 2017-03-03 NOTE — DISCHARGE NOTE ADULT - PLAN OF CARE
maintain normal blood pressure Continue current meds. Low sodium diet. Monitor blood pressure at home daily and follow up with your medical doctor in 1 week. Follow up with Dr. Contreras in 2 weeks. Continue current meds, maintain safe environment Continue current meds. Follow up with your medical doctor.

## 2017-03-03 NOTE — DISCHARGE NOTE ADULT - HOSPITAL COURSE
82-year-old female with acromegaly, HTN, recent medication non-adherence for approx. 7 days, 1 week ago presenting from her son's house with dizziness, unsteady gait, lightheadedness, with associated chest pain and diaphoresis.      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  CXR= No acute disease   CE negative x 1 2  EKG SB @ 56 b/ in, TWI AVL, AVR  WBC= 5.  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 7 days, 1 week ago presenting from her son's house with dizziness, unsteady gait, lightheadedness, with associated chest pain and diaphoresis.  Pt was admitted to tele, ruled out for MI< tele with no events.   CT H=No mass effect, hemorrhage or evidence of acute intracranial pathology.  CXR= No acute disease   CE negative x 1 2  EKG SB @ 56 b/ in, TWI AVL, AVR  WBC= 5.  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 Tiny aneurysms can be beyond the   resolution of MRA technique.  IMPRESSION brain MRA:  No evidence for intracranial arterial stenosis or   aneurysm.  MRA neck:TECHNIQUE: MR angiography of the neck was performed using two-dimensional   and three-dimensional time of flight technique as well as   contrast-enhanced technique. 9 cc Gadavist were administered. 1 cc were   discarded.  COMPARISON: None.  A bovine aortic arch is noted, a normal anatomic variant.  There is no evidence for internal carotid artery stenosis via NASCET   criteria. 0% stenosis is noted.  There is no common carotid artery or vertebral artery stenosis.  IMPRESSION neck MRA: No evidence for carotid or vertebral artery   stenosis..    Pt with no acute CVA, to continue on current BP meds. statin, ASA. Medically cleared for discharge. 82-year-old female with acromegaly, HTN, recent medication non-adherence for approx. 7 days, 1 week ago presenting from her son's house with dizziness, unsteady gait, lightheadedness, with associated chest pain and diaphoresis.  Pt was admitted to tele, ruled out for MI< tele with no events.   CT H=No mass effect, hemorrhage or evidence of acute intracranial pathology.  CXR= No acute disease   CE negative x 1 2  EKG SB @ 56 b/ in, TWI AVL, AVR  WBC= 5.  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 Tiny aneurysms can be beyond the   resolution of MRA technique.  IMPRESSION brain MRA:  No evidence for intracranial arterial stenosis or   aneurysm.  MRA neck:TECHNIQUE: MR angiography of the neck was performed using two-dimensional   and three-dimensional time of flight technique as well as   contrast-enhanced technique. 9 cc Gadavist were administered. 1 cc were   discarded.  COMPARISON: None.  A bovine aortic arch is noted, a normal anatomic variant.  There is no evidence for internal carotid artery stenosis via NASCET   criteria. 0% stenosis is noted.  There is no common carotid artery or vertebral artery stenosis.  IMPRESSION neck MRA: No evidence for carotid or vertebral artery   stenosis. No ischemia.    Pt with no acute CVA, to continue on current BP meds. statin, ASA. Medically cleared for discharge.

## 2017-03-03 NOTE — DISCHARGE NOTE ADULT - CARE PROVIDERS DIRECT ADDRESSES
,DirectAddress_Unknown,[email or web address],DirectAddress_Unknown

## 2017-03-03 NOTE — OCCUPATIONAL THERAPY INITIAL EVALUATION ADULT - DIAGNOSIS, OT EVAL
dizziness, standing balance deficits. dizziness, standing balance deficits, decreased functional mobility, decreased ADL independence

## 2017-03-03 NOTE — OCCUPATIONAL THERAPY INITIAL EVALUATION ADULT - GENERAL OBSERVATIONS, REHAB EVAL
Pt received seated at EOB Pt received seated at EOB. + telemetry monitor. Pt reported mild dizziness during session.

## 2017-03-03 NOTE — OCCUPATIONAL THERAPY INITIAL EVALUATION ADULT - PERTINENT HX OF CURRENT PROBLEM, REHAB EVAL
82-year-old female with HTN, recently , staying in [place] with her son, non complaint with her medications for the past few weeks, went to sleep 2/28 and awoke @ 730am on 3/1 with dizziness, generalized weakness, holocranial HA, blurred vison, nausea and diaphoresis. She got out of bed, did not feel steady on her feet and felt like she will fall. In the ED, CT Head (-).

## 2017-03-04 VITALS
HEART RATE: 57 BPM | RESPIRATION RATE: 18 BRPM | OXYGEN SATURATION: 98 % | SYSTOLIC BLOOD PRESSURE: 142 MMHG | DIASTOLIC BLOOD PRESSURE: 72 MMHG | TEMPERATURE: 98 F

## 2017-03-04 PROCEDURE — 99239 HOSP IP/OBS DSCHRG MGMT >30: CPT

## 2017-03-04 PROCEDURE — 99232 SBSQ HOSP IP/OBS MODERATE 35: CPT

## 2017-03-04 RX ORDER — METOPROLOL TARTRATE 50 MG
1 TABLET ORAL
Qty: 0 | Refills: 0 | COMMUNITY
Start: 2017-03-04

## 2017-03-04 RX ORDER — MEMANTINE HYDROCHLORIDE 10 MG/1
1 TABLET ORAL
Qty: 0 | Refills: 0 | DISCHARGE
Start: 2017-03-04

## 2017-03-04 RX ORDER — METOPROLOL TARTRATE 50 MG
1 TABLET ORAL
Qty: 0 | Refills: 0 | COMMUNITY

## 2017-03-04 RX ORDER — ASPIRIN/CALCIUM CARB/MAGNESIUM 324 MG
1 TABLET ORAL
Qty: 0 | Refills: 0 | COMMUNITY

## 2017-03-04 RX ORDER — ASPIRIN/CALCIUM CARB/MAGNESIUM 324 MG
1 TABLET ORAL
Qty: 0 | Refills: 0 | DISCHARGE
Start: 2017-03-04

## 2017-03-04 RX ORDER — MEMANTINE HYDROCHLORIDE 10 MG/1
1 TABLET ORAL
Qty: 0 | Refills: 0 | COMMUNITY

## 2017-03-04 RX ORDER — METOPROLOL TARTRATE 50 MG
1 TABLET ORAL
Qty: 30 | Refills: 0
Start: 2017-03-04 | End: 2017-04-03

## 2017-03-04 RX ADMIN — Medication 81 MILLIGRAM(S): at 14:53

## 2017-03-04 RX ADMIN — LOSARTAN POTASSIUM 50 MILLIGRAM(S): 100 TABLET, FILM COATED ORAL at 05:49

## 2017-03-04 RX ADMIN — PANTOPRAZOLE SODIUM 40 MILLIGRAM(S): 20 TABLET, DELAYED RELEASE ORAL at 05:49

## 2017-03-04 RX ADMIN — Medication 100 MILLIGRAM(S): at 05:49

## 2017-03-04 RX ADMIN — Medication 5 MILLIGRAM(S): at 14:54

## 2018-08-21 ENCOUNTER — APPOINTMENT (OUTPATIENT)
Dept: MRI IMAGING | Facility: IMAGING CENTER | Age: 83
End: 2018-08-21
Payer: MEDICARE

## 2018-08-21 ENCOUNTER — OUTPATIENT (OUTPATIENT)
Dept: OUTPATIENT SERVICES | Facility: HOSPITAL | Age: 83
LOS: 1 days | End: 2018-08-21
Payer: MEDICARE

## 2018-08-21 DIAGNOSIS — Z90.12 ACQUIRED ABSENCE OF LEFT BREAST AND NIPPLE: Chronic | ICD-10-CM

## 2018-08-21 DIAGNOSIS — Z90.710 ACQUIRED ABSENCE OF BOTH CERVIX AND UTERUS: Chronic | ICD-10-CM

## 2018-08-21 DIAGNOSIS — Z00.8 ENCOUNTER FOR OTHER GENERAL EXAMINATION: ICD-10-CM

## 2018-08-21 DIAGNOSIS — Z87.19 PERSONAL HISTORY OF OTHER DISEASES OF THE DIGESTIVE SYSTEM: Chronic | ICD-10-CM

## 2018-08-21 PROBLEM — F03.90 UNSPECIFIED DEMENTIA WITHOUT BEHAVIORAL DISTURBANCE: Chronic | Status: ACTIVE | Noted: 2017-03-01

## 2018-08-21 PROBLEM — E22.0 ACROMEGALY AND PITUITARY GIGANTISM: Chronic | Status: ACTIVE | Noted: 2017-03-02

## 2018-08-21 PROBLEM — I10 ESSENTIAL (PRIMARY) HYPERTENSION: Chronic | Status: ACTIVE | Noted: 2017-03-01

## 2018-08-21 PROBLEM — Z00.00 ENCOUNTER FOR PREVENTIVE HEALTH EXAMINATION: Status: ACTIVE | Noted: 2018-08-21

## 2018-08-21 PROCEDURE — 70551 MRI BRAIN STEM W/O DYE: CPT | Mod: 26

## 2018-08-21 PROCEDURE — 70551 MRI BRAIN STEM W/O DYE: CPT

## 2018-08-24 ENCOUNTER — EMERGENCY (EMERGENCY)
Facility: HOSPITAL | Age: 83
LOS: 1 days | Discharge: LEFT BEFORE DISCHARGE | End: 2018-08-24
Admitting: EMERGENCY MEDICINE

## 2018-08-24 ENCOUNTER — APPOINTMENT (OUTPATIENT)
Dept: CV DIAGNOSTICS | Facility: HOSPITAL | Age: 83
End: 2018-08-24
Payer: MEDICARE

## 2018-08-24 ENCOUNTER — OUTPATIENT (OUTPATIENT)
Dept: OUTPATIENT SERVICES | Facility: HOSPITAL | Age: 83
LOS: 1 days | End: 2018-08-24

## 2018-08-24 VITALS
OXYGEN SATURATION: 100 % | RESPIRATION RATE: 16 BRPM | DIASTOLIC BLOOD PRESSURE: 60 MMHG | SYSTOLIC BLOOD PRESSURE: 150 MMHG | TEMPERATURE: 98 F | HEART RATE: 60 BPM

## 2018-08-24 DIAGNOSIS — Z87.19 PERSONAL HISTORY OF OTHER DISEASES OF THE DIGESTIVE SYSTEM: Chronic | ICD-10-CM

## 2018-08-24 DIAGNOSIS — Z90.710 ACQUIRED ABSENCE OF BOTH CERVIX AND UTERUS: Chronic | ICD-10-CM

## 2018-08-24 DIAGNOSIS — R94.39 ABNORMAL RESULT OF OTHER CARDIOVASCULAR FUNCTION STUDY: ICD-10-CM

## 2018-08-24 DIAGNOSIS — Z90.12 ACQUIRED ABSENCE OF LEFT BREAST AND NIPPLE: Chronic | ICD-10-CM

## 2018-08-24 PROCEDURE — 93016 CV STRESS TEST SUPVJ ONLY: CPT | Mod: GC

## 2018-08-24 PROCEDURE — 93018 CV STRESS TEST I&R ONLY: CPT | Mod: GC

## 2018-08-24 PROCEDURE — 78452 HT MUSCLE IMAGE SPECT MULT: CPT | Mod: 26

## 2018-08-24 NOTE — ED ADULT TRIAGE NOTE - CHIEF COMPLAINT QUOTE
Pt c/o mechanical trip and fall, denies trauma to head/LOC. Pt c/o dizziness since fall. Pt c/o mechanical trip and fall after stress test. Pt denies trauma to head/LOC. Pt c/o dizziness since fall.

## 2018-08-24 NOTE — ED ADULT NURSE NOTE - EXPLANATION OF PATIENT'S REASON FOR LEAVING
pt reports feeling better, states she no longer wants to wait. ivl placed pta in echo- removed prior to leaving

## 2018-08-24 NOTE — ED ADULT NURSE NOTE - CHIEF COMPLAINT QUOTE
Pt c/o mechanical trip and fall after stress test. Pt denies trauma to head/LOC. Pt c/o dizziness since fall.

## 2019-10-04 ENCOUNTER — APPOINTMENT (OUTPATIENT)
Dept: MAMMOGRAPHY | Facility: CLINIC | Age: 84
End: 2019-10-04
Payer: MEDICARE

## 2019-10-04 ENCOUNTER — OUTPATIENT (OUTPATIENT)
Dept: OUTPATIENT SERVICES | Facility: HOSPITAL | Age: 84
LOS: 1 days | End: 2019-10-04
Payer: MEDICARE

## 2019-10-04 ENCOUNTER — APPOINTMENT (OUTPATIENT)
Dept: ULTRASOUND IMAGING | Facility: CLINIC | Age: 84
End: 2019-10-04
Payer: MEDICARE

## 2019-10-04 DIAGNOSIS — Z00.8 ENCOUNTER FOR OTHER GENERAL EXAMINATION: ICD-10-CM

## 2019-10-04 DIAGNOSIS — Z90.12 ACQUIRED ABSENCE OF LEFT BREAST AND NIPPLE: Chronic | ICD-10-CM

## 2019-10-04 DIAGNOSIS — Z87.19 PERSONAL HISTORY OF OTHER DISEASES OF THE DIGESTIVE SYSTEM: Chronic | ICD-10-CM

## 2019-10-04 DIAGNOSIS — Z90.710 ACQUIRED ABSENCE OF BOTH CERVIX AND UTERUS: Chronic | ICD-10-CM

## 2019-10-04 PROCEDURE — 76641 ULTRASOUND BREAST COMPLETE: CPT

## 2019-10-04 PROCEDURE — 77063 BREAST TOMOSYNTHESIS BI: CPT | Mod: 26,52

## 2019-10-04 PROCEDURE — 77067 SCR MAMMO BI INCL CAD: CPT

## 2019-10-04 PROCEDURE — 77067 SCR MAMMO BI INCL CAD: CPT | Mod: 26,RT,52

## 2019-10-04 PROCEDURE — 77063 BREAST TOMOSYNTHESIS BI: CPT

## 2019-10-04 PROCEDURE — 76641 ULTRASOUND BREAST COMPLETE: CPT | Mod: 26,RT

## 2019-12-04 ENCOUNTER — EMERGENCY (EMERGENCY)
Facility: HOSPITAL | Age: 84
LOS: 1 days | Discharge: HOME | End: 2019-12-04
Attending: STUDENT IN AN ORGANIZED HEALTH CARE EDUCATION/TRAINING PROGRAM | Admitting: STUDENT IN AN ORGANIZED HEALTH CARE EDUCATION/TRAINING PROGRAM
Payer: MEDICARE

## 2019-12-04 VITALS
DIASTOLIC BLOOD PRESSURE: 84 MMHG | OXYGEN SATURATION: 99 % | RESPIRATION RATE: 18 BRPM | SYSTOLIC BLOOD PRESSURE: 158 MMHG | TEMPERATURE: 99 F | HEART RATE: 60 BPM

## 2019-12-04 VITALS
HEART RATE: 87 BPM | TEMPERATURE: 99 F | RESPIRATION RATE: 18 BRPM | DIASTOLIC BLOOD PRESSURE: 91 MMHG | OXYGEN SATURATION: 97 % | SYSTOLIC BLOOD PRESSURE: 173 MMHG

## 2019-12-04 DIAGNOSIS — Z90.710 ACQUIRED ABSENCE OF BOTH CERVIX AND UTERUS: Chronic | ICD-10-CM

## 2019-12-04 DIAGNOSIS — Z87.19 PERSONAL HISTORY OF OTHER DISEASES OF THE DIGESTIVE SYSTEM: Chronic | ICD-10-CM

## 2019-12-04 DIAGNOSIS — Z90.12 ACQUIRED ABSENCE OF LEFT BREAST AND NIPPLE: Chronic | ICD-10-CM

## 2019-12-04 LAB
ALBUMIN SERPL ELPH-MCNC: 3.8 G/DL — SIGNIFICANT CHANGE UP (ref 3.3–5)
ALP SERPL-CCNC: 54 U/L — SIGNIFICANT CHANGE UP (ref 40–120)
ALT FLD-CCNC: 12 U/L — SIGNIFICANT CHANGE UP (ref 4–33)
ANION GAP SERPL CALC-SCNC: 8 MMO/L — SIGNIFICANT CHANGE UP (ref 7–14)
APPEARANCE UR: CLEAR — SIGNIFICANT CHANGE UP
AST SERPL-CCNC: 21 U/L — SIGNIFICANT CHANGE UP (ref 4–32)
BASE EXCESS BLDV CALC-SCNC: 7.8 MMOL/L — SIGNIFICANT CHANGE UP
BASOPHILS # BLD AUTO: 0.03 K/UL — SIGNIFICANT CHANGE UP (ref 0–0.2)
BASOPHILS NFR BLD AUTO: 0.4 % — SIGNIFICANT CHANGE UP (ref 0–2)
BILIRUB SERPL-MCNC: 0.4 MG/DL — SIGNIFICANT CHANGE UP (ref 0.2–1.2)
BILIRUB UR-MCNC: NEGATIVE — SIGNIFICANT CHANGE UP
BLOOD GAS VENOUS - CREATININE: 0.53 MG/DL — SIGNIFICANT CHANGE UP (ref 0.5–1.3)
BLOOD UR QL VISUAL: NEGATIVE — SIGNIFICANT CHANGE UP
BUN SERPL-MCNC: 10 MG/DL — SIGNIFICANT CHANGE UP (ref 7–23)
CALCIUM SERPL-MCNC: 9.5 MG/DL — SIGNIFICANT CHANGE UP (ref 8.4–10.5)
CHLORIDE BLDV-SCNC: 103 MMOL/L — SIGNIFICANT CHANGE UP (ref 96–108)
CHLORIDE SERPL-SCNC: 102 MMOL/L — SIGNIFICANT CHANGE UP (ref 98–107)
CO2 SERPL-SCNC: 30 MMOL/L — SIGNIFICANT CHANGE UP (ref 22–31)
COLOR SPEC: SIGNIFICANT CHANGE UP
CREAT SERPL-MCNC: 0.55 MG/DL — SIGNIFICANT CHANGE UP (ref 0.5–1.3)
EOSINOPHIL # BLD AUTO: 0.19 K/UL — SIGNIFICANT CHANGE UP (ref 0–0.5)
EOSINOPHIL NFR BLD AUTO: 2.6 % — SIGNIFICANT CHANGE UP (ref 0–6)
GAS PNL BLDV: 139 MMOL/L — SIGNIFICANT CHANGE UP (ref 136–146)
GLUCOSE BLDV-MCNC: 87 MG/DL — SIGNIFICANT CHANGE UP (ref 70–99)
GLUCOSE SERPL-MCNC: 91 MG/DL — SIGNIFICANT CHANGE UP (ref 70–99)
GLUCOSE UR-MCNC: NEGATIVE — SIGNIFICANT CHANGE UP
HCO3 BLDV-SCNC: 29 MMOL/L — HIGH (ref 20–27)
HCT VFR BLD CALC: 34.5 % — SIGNIFICANT CHANGE UP (ref 34.5–45)
HCT VFR BLDV CALC: 35.3 % — SIGNIFICANT CHANGE UP (ref 34.5–45)
HGB BLD-MCNC: 11.3 G/DL — LOW (ref 11.5–15.5)
HGB BLDV-MCNC: 11.4 G/DL — LOW (ref 11.5–15.5)
IMM GRANULOCYTES NFR BLD AUTO: 0.1 % — SIGNIFICANT CHANGE UP (ref 0–1.5)
KETONES UR-MCNC: NEGATIVE — SIGNIFICANT CHANGE UP
LACTATE BLDV-MCNC: 1.5 MMOL/L — SIGNIFICANT CHANGE UP (ref 0.5–2)
LEUKOCYTE ESTERASE UR-ACNC: NEGATIVE — SIGNIFICANT CHANGE UP
LIDOCAIN IGE QN: 25.1 U/L — SIGNIFICANT CHANGE UP (ref 7–60)
LYMPHOCYTES # BLD AUTO: 1.25 K/UL — SIGNIFICANT CHANGE UP (ref 1–3.3)
LYMPHOCYTES # BLD AUTO: 17.4 % — SIGNIFICANT CHANGE UP (ref 13–44)
MCHC RBC-ENTMCNC: 24.6 PG — LOW (ref 27–34)
MCHC RBC-ENTMCNC: 32.8 % — SIGNIFICANT CHANGE UP (ref 32–36)
MCV RBC AUTO: 75 FL — LOW (ref 80–100)
MONOCYTES # BLD AUTO: 0.7 K/UL — SIGNIFICANT CHANGE UP (ref 0–0.9)
MONOCYTES NFR BLD AUTO: 9.7 % — SIGNIFICANT CHANGE UP (ref 2–14)
NEUTROPHILS # BLD AUTO: 5.01 K/UL — SIGNIFICANT CHANGE UP (ref 1.8–7.4)
NEUTROPHILS NFR BLD AUTO: 69.8 % — SIGNIFICANT CHANGE UP (ref 43–77)
NITRITE UR-MCNC: NEGATIVE — SIGNIFICANT CHANGE UP
NRBC # FLD: 0 K/UL — SIGNIFICANT CHANGE UP (ref 0–0)
PCO2 BLDV: 62 MMHG — HIGH (ref 41–51)
PH BLDV: 7.35 PH — SIGNIFICANT CHANGE UP (ref 7.32–7.43)
PH UR: 7 — SIGNIFICANT CHANGE UP (ref 5–8)
PLATELET # BLD AUTO: 179 K/UL — SIGNIFICANT CHANGE UP (ref 150–400)
PMV BLD: SIGNIFICANT CHANGE UP FL (ref 7–13)
PO2 BLDV: < 24 MMHG — LOW (ref 35–40)
POTASSIUM BLDV-SCNC: 3.7 MMOL/L — SIGNIFICANT CHANGE UP (ref 3.4–4.5)
POTASSIUM SERPL-MCNC: 4.3 MMOL/L — SIGNIFICANT CHANGE UP (ref 3.5–5.3)
POTASSIUM SERPL-SCNC: 4.3 MMOL/L — SIGNIFICANT CHANGE UP (ref 3.5–5.3)
PROT SERPL-MCNC: 7 G/DL — SIGNIFICANT CHANGE UP (ref 6–8.3)
PROT UR-MCNC: NEGATIVE — SIGNIFICANT CHANGE UP
RBC # BLD: 4.6 M/UL — SIGNIFICANT CHANGE UP (ref 3.8–5.2)
RBC # FLD: 17.4 % — HIGH (ref 10.3–14.5)
SAO2 % BLDV: 26.7 % — LOW (ref 60–85)
SODIUM SERPL-SCNC: 140 MMOL/L — SIGNIFICANT CHANGE UP (ref 135–145)
SP GR SPEC: 1.01 — SIGNIFICANT CHANGE UP (ref 1–1.04)
UROBILINOGEN FLD QL: NORMAL — SIGNIFICANT CHANGE UP
WBC # BLD: 7.19 K/UL — SIGNIFICANT CHANGE UP (ref 3.8–10.5)
WBC # FLD AUTO: 7.19 K/UL — SIGNIFICANT CHANGE UP (ref 3.8–10.5)

## 2019-12-04 PROCEDURE — 99284 EMERGENCY DEPT VISIT MOD MDM: CPT

## 2019-12-04 PROCEDURE — 74177 CT ABD & PELVIS W/CONTRAST: CPT | Mod: 26

## 2019-12-04 RX ADMIN — Medication 1 TABLET(S): at 16:13

## 2019-12-04 NOTE — ED PROVIDER NOTE - PROGRESS NOTE DETAILS
KIRAN Guy: CT showing proctocolitis, will rx augmentin and recommend GI follow up. Pt agrees with this plan

## 2019-12-04 NOTE — ED PROVIDER NOTE - NSFOLLOWUPINSTRUCTIONS_ED_ALL_ED_FT
Follow up with your primary care provider within 1 week  Follow up with a GI doctor within 1 week, referral list provided  Take Augmentin 875mg, 1 tablet, twice a day for 10 days  Return to the ER with any worsening or concerning symptoms, increased pain, fever/chills, weakness, vomiting, bloody bowel movements or any other concerns.

## 2019-12-04 NOTE — ED ADULT NURSE NOTE - OBJECTIVE STATEMENT
Pt A/Ox4 states she has had diffuse lower abd pain since Thanksgiving. Pt states she has a hx od bowel obstructions and this feels similar, states she has been passing gas and having diarrhea, denies n/v, denies f/c. Pt appears well, breathing even and unlabored, skin warm and dry. PIV inserted with aseptic technique, blood drawn, labeled at bedside with 2 pt identifiers and sent to lab. Pt and family aware of POC, NAD. Will continue to monitor.

## 2019-12-04 NOTE — ED PROVIDER NOTE - CARE PLAN
Principal Discharge DX:	Proctocolitis  Secondary Diagnosis:	Abdominal pain  Secondary Diagnosis:	Diarrhea

## 2019-12-04 NOTE — ED ADULT NURSE NOTE - NSFALLRSKASSESASSIST_ED_ALL_ED
Physical Therapy Discharge Note      Name: Cedric Gupta Jr.  Clinic Number: [id number]  Diagnosis:        Encounter Diagnoses   Name Primary?    Pain of both hip joints Yes    Muscle weakness      Stiffness of both hip joints        Physician: Noy Lees,*  Treatment Orders: PT Eval and Treat       Past Medical History:   Diagnosis Date    Basal cell carcinoma      BPH (benign prostatic hypertrophy)      Cataract       OU    Elevated PSA      Epiretinal membrane       OS    Fatty liver       noted on usg    History of colon polyps      History of duodenal ulcer       x 2 8/13 and 8/14    History of nephrolithiasis 2008     passed stone    History of prediabetes      Hyperlipidemia       No current medications    Macular degeneration       dry -- OU    Osteoarthritis      S/P colonoscopy       8/15; 8/20    Squamous cell carcinoma 11/08/2016     Right forearm      Precautions: OA B hips, lumbar DDD  Evaluation date: 12/20/2018  Visit # authorized: 3/20  Authorization period: 12-31-18  Plan of care expiration: 2-14-19  MD Follow up appt: none scheduled     Time In:  9:03  Time Out:  10:05  Billable Time:  55 min     Subjective      Pt reports: no hip pain right now.  Last night he had a little hip pain and did push/pull and felt better  Pt states he has been more compliant with HEP.  Pt states shoulders are still a problem     Pain Scale: before treatment: 0 currently; after treatment: 0     Objective    Hip reassessment performed on 2-27-19  Pelvic positioning: level at IE AR R      Hip ROM: (measured in degrees) 2-27-19  Hip RLE LLE   Flexion 120 120   Abduction  20 20   Adduction 20 20   Internal rotation 20 20   External rotation 50 50         Hip ROM: (measured in degrees) initial eval  Hip RLE LLE   Flexion 120 120   Abduction  20 20   Adduction 20 20   Internal rotation 20 10   External rotation 50 40         Flexibility testing:  - hamstrings:     90/90 test R 10 L 15 at IE  R 20  L 25           - gastrocnemius:   DF ankle R 5 degrees L 5 degrees             - hip flexors: decreased tightness noted at IE  + tightness B R >L     Muscle Strength 2-25-19  MMT R L   Hip flexion 4+/5 4+/5   Hip abduction 5-/5 5/5   Hip extension 4/5 4+/5   Glut max 3+/5 4-/5           Knee extension 5/5 5/5   Knee flexion 5/5 5/5      Muscle Strength initial eval  MMT R L   Hip flexion 4/5 4/5   Hip abduction 4/5 4/5   Hip extension 4-/5 4-/5   Glut max 3-/5 3-/5           Knee extension 5/5 5/5   Knee flexion 5/5 5/5      Palpation: no TTP at IE mild TTP to psoas B     Outcome measures:   Impairment: mobility  FOTO hip disability index: 67 at IE IE 60  PT reviewed FOTO scores for Cedric Gupta .  FOTO scores were entered into [employer] - see media section.     § G-code discharge: CJ at least 20%, but < 40% impaired, limited or restricted  § G-code goal: CJ at least 20%, but < 40% impaired, limited or restricted  · Severity modifier selections based on the FOTO scoring, objective findings, and co-morbidity assessment     Functional assessment:   - walking/gait:  no deficits at IE FB trunk slightly, no deficit  - sit to stand:no difficulty at IE min-mod use of hands  - sit to supine: no difficulty at IE min to 0 difficutly        - supine to sit:no difficulty at IE  min to 0  - supine to prone:no difficulty at IE  N/T          TREATMENT  Therapeutic exercise: Cedric received therapeutic exercises to develop strength, endurance, ROM, flexibility and core stabilization for 55 minutes including:   Bridge x 20  Pelvic tilt x 20  Trunk rotation x 20     Hip flexor stretch x 30 sec x 2  HS stretch x 10 after HS hip pain bending R LE< did push/pull and relieved symptoms  Piriformis stretch x 5 due to slight pinching in groin                 Partial sit up x 10              SLR X 10  Hip abduction sidelying x 20  Hip ext prone with pillow under stomach x 20              Hip ext prone with bent knee with pillow under  stomach x 10  Push/pull x 3      Pt does a better job of ex on L LE so instructed pt to do L first to feel normal and then R to help improve performance and recruitment     Verbal and tactile cueing provided for proper performance and recruitment.     Written Home Exercises Provided: indented ex noted above  Pt demo good understanding of the education provided. Halphen demonstrated good return demonstration of activities.      Pt. education:  · Posture reeducation, body mechanics, HEP,   · No spiritual or educational barriers to learning provided  · Pt has no cultural, educational or language barriers to learning provided.     Assessment   Patient demonstrates improvement in range of motion, strength, stabilization and function.       GOALS:   Short Term Goals:  4 weeks MET STG's  Increase range of motion 25%  Increase strength 1/2 muscle grade  Improve postural awareness of pelvis to independently identify dysfunction with min assist from PT  Be able to perform HEP with minimal cueing required  Improve functional impairment of mobility to 62     Long Term Goals: 8 weeks MET LTG's  Increase range of motion to 75% to 100% full   Improve muscle strength 1 muscle grade  Improve and stabilize proper pelvic positioning  Restore ability to ambulate with normal pain free gait  Walking for ADL will be restored without increased pain  Restore ability to perform sit to stand transfer without increased pain  Restore normal sleep habits without disturbances due to pain  Restore ability to perform ADL's and household activities independently and without increased pain  Improve functional impairment of mobility to CJ at least 20%, but < 40% impaired, limited or restricted        Plan   Patient is discharged from physical therapy after fully achieving the established goals.  Thank you for allowing us to assist in the care of your patient..     no

## 2019-12-04 NOTE — ED PROVIDER NOTE - PMH
Acromegaly    Dementia without behavioral disturbance, unspecified dementia type    HTN (hypertension)

## 2019-12-04 NOTE — ED PROVIDER NOTE - CLINICAL SUMMARY MEDICAL DECISION MAKING FREE TEXT BOX
84yF w/pmhx HTN, acromegaly, SBO?, remote hx breast cancer p/w abdominal pain and diarrhea x 7 days. On exam pts abdomen is non-tender, hyperactive bowel sounds in lower quadrants. Given hx of SBO will get CT abd/pelvis with po and IV contrast, check cbc/cmp, ua and culture. Pt appears comfortable at this time, VSS. Concern for colitis, enteritis, SBO. Ney Alejandre DO: 85 yo F pmh HTN, acromegaly, ? hx SBO, remote hx breast cancer with abdominal pain, diarrhea x 7 days. reports symptoms began after eating take out food on thanksgiving, but others ate food without issue. patient tolerating po. On exam, mild lower abdominal tenderness. Ddx includes, but not limited to, colitis, enteritis, SBO. plan: labs, ct, symptom relief prn (pt declining meds at time of eval), reassess.

## 2019-12-04 NOTE — ED ADULT NURSE NOTE - NSIMPLEMENTINTERV_GEN_ALL_ED
Implemented All Universal Safety Interventions:  [place] to call system. Call bell, personal items and telephone within reach. Instruct patient to call for assistance. Room bathroom lighting operational. Non-slip footwear when patient is off stretcher. Physically safe environment: no spills, clutter or unnecessary equipment. Stretcher in lowest position, wheels locked, appropriate side rails in place.

## 2019-12-04 NOTE — ED PROVIDER NOTE - PATIENT PORTAL LINK FT
You can access the FollowMyHealth Patient Portal offered by [facility] by registering at the following website: [email or web address]. By joining [facility]’s FollowMyHealth portal, you will also be able to view your health information using other applications (apps) compatible with our system.

## 2019-12-04 NOTE — ED PROVIDER NOTE - OBJECTIVE STATEMENT
84yF w/pmhx HTN, acromegaly, hx breast cancer, SBO? p/w diarrhea and abdominal cramping x 7 days. Pt states starting on Thanksgiving she developed bilateral lower abdominal pain and cramping with diarrhea. Pt states she has 4-5 episodes of diarrhea daily, no blood in stool. She states she constantly has generalized abdominal pain with intermittent cramping which is relieved with bowel movements. Pt denies fever/chills, nausea, vomiting, chest pain, shortness of breath, weakness, dizziness, near syncope or any other concerns.

## 2019-12-05 LAB — SPECIMEN SOURCE: SIGNIFICANT CHANGE UP

## 2019-12-06 LAB — BACTERIA UR CULT: SIGNIFICANT CHANGE UP

## 2020-08-17 ENCOUNTER — TRANSCRIPTION ENCOUNTER (OUTPATIENT)
Age: 85
End: 2020-08-17

## 2020-08-27 NOTE — ED ADULT NURSE NOTE - CAS EDN DISCHARGE ASSESSMENT
Alert and oriented to person, place and time - will hold oral agents  - will monitor fingersticks and treat with ISS for now with plan to adjust regimen prn

## 2020-09-04 ENCOUNTER — OUTPATIENT (OUTPATIENT)
Dept: OUTPATIENT SERVICES | Facility: HOSPITAL | Age: 85
LOS: 1 days | End: 2020-09-04
Payer: MEDICARE

## 2020-09-04 ENCOUNTER — APPOINTMENT (OUTPATIENT)
Dept: MRI IMAGING | Facility: IMAGING CENTER | Age: 85
End: 2020-09-04
Payer: MEDICARE

## 2020-09-04 DIAGNOSIS — Z00.8 ENCOUNTER FOR OTHER GENERAL EXAMINATION: ICD-10-CM

## 2020-09-04 DIAGNOSIS — Z90.710 ACQUIRED ABSENCE OF BOTH CERVIX AND UTERUS: Chronic | ICD-10-CM

## 2020-09-04 DIAGNOSIS — Z87.19 PERSONAL HISTORY OF OTHER DISEASES OF THE DIGESTIVE SYSTEM: Chronic | ICD-10-CM

## 2020-09-04 DIAGNOSIS — Z90.12 ACQUIRED ABSENCE OF LEFT BREAST AND NIPPLE: Chronic | ICD-10-CM

## 2020-09-04 PROCEDURE — 72148 MRI LUMBAR SPINE W/O DYE: CPT | Mod: 26

## 2020-09-04 PROCEDURE — 72148 MRI LUMBAR SPINE W/O DYE: CPT

## 2020-10-26 ENCOUNTER — APPOINTMENT (OUTPATIENT)
Dept: MAMMOGRAPHY | Facility: IMAGING CENTER | Age: 85
End: 2020-10-26

## 2020-10-26 ENCOUNTER — APPOINTMENT (OUTPATIENT)
Dept: ULTRASOUND IMAGING | Facility: IMAGING CENTER | Age: 85
End: 2020-10-26

## 2020-10-28 ENCOUNTER — APPOINTMENT (OUTPATIENT)
Dept: MAMMOGRAPHY | Facility: IMAGING CENTER | Age: 85
End: 2020-10-28
Payer: MEDICARE

## 2020-10-28 ENCOUNTER — APPOINTMENT (OUTPATIENT)
Dept: ULTRASOUND IMAGING | Facility: IMAGING CENTER | Age: 85
End: 2020-10-28
Payer: MEDICARE

## 2020-10-28 ENCOUNTER — OUTPATIENT (OUTPATIENT)
Dept: OUTPATIENT SERVICES | Facility: HOSPITAL | Age: 85
LOS: 1 days | End: 2020-10-28
Payer: MEDICARE

## 2020-10-28 DIAGNOSIS — Z90.12 ACQUIRED ABSENCE OF LEFT BREAST AND NIPPLE: Chronic | ICD-10-CM

## 2020-10-28 DIAGNOSIS — Z90.710 ACQUIRED ABSENCE OF BOTH CERVIX AND UTERUS: Chronic | ICD-10-CM

## 2020-10-28 DIAGNOSIS — Z87.19 PERSONAL HISTORY OF OTHER DISEASES OF THE DIGESTIVE SYSTEM: Chronic | ICD-10-CM

## 2020-10-28 DIAGNOSIS — Z00.8 ENCOUNTER FOR OTHER GENERAL EXAMINATION: ICD-10-CM

## 2020-10-28 PROCEDURE — 77063 BREAST TOMOSYNTHESIS BI: CPT

## 2020-10-28 PROCEDURE — 77063 BREAST TOMOSYNTHESIS BI: CPT | Mod: 26,52

## 2020-10-28 PROCEDURE — 77067 SCR MAMMO BI INCL CAD: CPT

## 2020-10-28 PROCEDURE — 77067 SCR MAMMO BI INCL CAD: CPT | Mod: 26,RT,52

## 2021-01-25 ENCOUNTER — TRANSCRIPTION ENCOUNTER (OUTPATIENT)
Age: 86
End: 2021-01-25

## 2021-04-28 ENCOUNTER — APPOINTMENT (OUTPATIENT)
Dept: MAMMOGRAPHY | Facility: IMAGING CENTER | Age: 86
End: 2021-04-28
Payer: MEDICARE

## 2021-04-28 ENCOUNTER — APPOINTMENT (OUTPATIENT)
Dept: ULTRASOUND IMAGING | Facility: IMAGING CENTER | Age: 86
End: 2021-04-28
Payer: MEDICARE

## 2021-04-28 ENCOUNTER — OUTPATIENT (OUTPATIENT)
Dept: OUTPATIENT SERVICES | Facility: HOSPITAL | Age: 86
LOS: 1 days | End: 2021-04-28
Payer: MEDICARE

## 2021-04-28 DIAGNOSIS — Z00.8 ENCOUNTER FOR OTHER GENERAL EXAMINATION: ICD-10-CM

## 2021-04-28 DIAGNOSIS — Z90.710 ACQUIRED ABSENCE OF BOTH CERVIX AND UTERUS: Chronic | ICD-10-CM

## 2021-04-28 DIAGNOSIS — Z90.12 ACQUIRED ABSENCE OF LEFT BREAST AND NIPPLE: Chronic | ICD-10-CM

## 2021-04-28 DIAGNOSIS — Z87.19 PERSONAL HISTORY OF OTHER DISEASES OF THE DIGESTIVE SYSTEM: Chronic | ICD-10-CM

## 2021-04-28 PROCEDURE — 76642 ULTRASOUND BREAST LIMITED: CPT

## 2021-04-28 PROCEDURE — 76642 ULTRASOUND BREAST LIMITED: CPT | Mod: 26,LT

## 2021-05-26 ENCOUNTER — APPOINTMENT (OUTPATIENT)
Dept: MRI IMAGING | Facility: IMAGING CENTER | Age: 86
End: 2021-05-26
Payer: MEDICARE

## 2021-05-26 ENCOUNTER — OUTPATIENT (OUTPATIENT)
Dept: OUTPATIENT SERVICES | Facility: HOSPITAL | Age: 86
LOS: 1 days | End: 2021-05-26
Payer: MEDICARE

## 2021-05-26 DIAGNOSIS — Z90.12 ACQUIRED ABSENCE OF LEFT BREAST AND NIPPLE: Chronic | ICD-10-CM

## 2021-05-26 DIAGNOSIS — Z90.710 ACQUIRED ABSENCE OF BOTH CERVIX AND UTERUS: Chronic | ICD-10-CM

## 2021-05-26 DIAGNOSIS — Z87.19 PERSONAL HISTORY OF OTHER DISEASES OF THE DIGESTIVE SYSTEM: Chronic | ICD-10-CM

## 2021-05-26 DIAGNOSIS — Z00.8 ENCOUNTER FOR OTHER GENERAL EXAMINATION: ICD-10-CM

## 2021-05-26 PROCEDURE — 70553 MRI BRAIN STEM W/O & W/DYE: CPT

## 2021-05-26 PROCEDURE — 70553 MRI BRAIN STEM W/O & W/DYE: CPT | Mod: 26,MH

## 2021-05-27 ENCOUNTER — APPOINTMENT (OUTPATIENT)
Dept: MRI IMAGING | Facility: CLINIC | Age: 86
End: 2021-05-27

## 2021-12-08 ENCOUNTER — APPOINTMENT (OUTPATIENT)
Dept: CT IMAGING | Facility: CLINIC | Age: 86
End: 2021-12-08
Payer: MEDICARE

## 2021-12-08 ENCOUNTER — OUTPATIENT (OUTPATIENT)
Dept: OUTPATIENT SERVICES | Facility: HOSPITAL | Age: 86
LOS: 1 days | End: 2021-12-08
Payer: MEDICARE

## 2021-12-08 DIAGNOSIS — Z87.19 PERSONAL HISTORY OF OTHER DISEASES OF THE DIGESTIVE SYSTEM: Chronic | ICD-10-CM

## 2021-12-08 DIAGNOSIS — Z00.00 ENCOUNTER FOR GENERAL ADULT MEDICAL EXAMINATION WITHOUT ABNORMAL FINDINGS: ICD-10-CM

## 2021-12-08 DIAGNOSIS — Z90.12 ACQUIRED ABSENCE OF LEFT BREAST AND NIPPLE: Chronic | ICD-10-CM

## 2021-12-08 DIAGNOSIS — Z90.710 ACQUIRED ABSENCE OF BOTH CERVIX AND UTERUS: Chronic | ICD-10-CM

## 2021-12-08 PROCEDURE — 70450 CT HEAD/BRAIN W/O DYE: CPT | Mod: MH

## 2021-12-08 PROCEDURE — 70450 CT HEAD/BRAIN W/O DYE: CPT | Mod: 26,MH

## 2021-12-14 ENCOUNTER — INPATIENT (INPATIENT)
Facility: HOSPITAL | Age: 86
LOS: 12 days | Discharge: NURSING, REHAB OR LONG-TERM CARE | DRG: 308 | End: 2021-12-27
Attending: INTERNAL MEDICINE | Admitting: INTERNAL MEDICINE
Payer: MEDICARE

## 2021-12-14 VITALS
RESPIRATION RATE: 18 BRPM | HEART RATE: 62 BPM | HEIGHT: 68 IN | OXYGEN SATURATION: 99 % | WEIGHT: 160.06 LBS | DIASTOLIC BLOOD PRESSURE: 77 MMHG | TEMPERATURE: 98 F | SYSTOLIC BLOOD PRESSURE: 169 MMHG

## 2021-12-14 DIAGNOSIS — Z90.710 ACQUIRED ABSENCE OF BOTH CERVIX AND UTERUS: Chronic | ICD-10-CM

## 2021-12-14 DIAGNOSIS — Z87.19 PERSONAL HISTORY OF OTHER DISEASES OF THE DIGESTIVE SYSTEM: Chronic | ICD-10-CM

## 2021-12-14 DIAGNOSIS — R00.1 BRADYCARDIA, UNSPECIFIED: ICD-10-CM

## 2021-12-14 DIAGNOSIS — Z90.12 ACQUIRED ABSENCE OF LEFT BREAST AND NIPPLE: Chronic | ICD-10-CM

## 2021-12-14 LAB
ALBUMIN SERPL ELPH-MCNC: 4 G/DL — SIGNIFICANT CHANGE UP (ref 3.3–5)
ALP SERPL-CCNC: 114 U/L — SIGNIFICANT CHANGE UP (ref 40–120)
ALT FLD-CCNC: 84 U/L — HIGH (ref 10–45)
ANION GAP SERPL CALC-SCNC: 9 MMOL/L — SIGNIFICANT CHANGE UP (ref 5–17)
AST SERPL-CCNC: 133 U/L — HIGH (ref 10–40)
BASOPHILS # BLD AUTO: 0.03 K/UL — SIGNIFICANT CHANGE UP (ref 0–0.2)
BASOPHILS NFR BLD AUTO: 0.5 % — SIGNIFICANT CHANGE UP (ref 0–2)
BILIRUB SERPL-MCNC: 0.4 MG/DL — SIGNIFICANT CHANGE UP (ref 0.2–1.2)
BUN SERPL-MCNC: 17 MG/DL — SIGNIFICANT CHANGE UP (ref 7–23)
CALCIUM SERPL-MCNC: 9.3 MG/DL — SIGNIFICANT CHANGE UP (ref 8.4–10.5)
CHLORIDE SERPL-SCNC: 105 MMOL/L — SIGNIFICANT CHANGE UP (ref 96–108)
CO2 SERPL-SCNC: 32 MMOL/L — HIGH (ref 22–31)
CREAT SERPL-MCNC: 0.68 MG/DL — SIGNIFICANT CHANGE UP (ref 0.5–1.3)
EOSINOPHIL # BLD AUTO: 0.1 K/UL — SIGNIFICANT CHANGE UP (ref 0–0.5)
EOSINOPHIL NFR BLD AUTO: 1.7 % — SIGNIFICANT CHANGE UP (ref 0–6)
GLUCOSE SERPL-MCNC: 108 MG/DL — HIGH (ref 70–99)
HCT VFR BLD CALC: 34.2 % — LOW (ref 34.5–45)
HGB BLD-MCNC: 10.9 G/DL — LOW (ref 11.5–15.5)
IMM GRANULOCYTES NFR BLD AUTO: 0.2 % — SIGNIFICANT CHANGE UP (ref 0–1.5)
LYMPHOCYTES # BLD AUTO: 0.89 K/UL — LOW (ref 1–3.3)
LYMPHOCYTES # BLD AUTO: 15 % — SIGNIFICANT CHANGE UP (ref 13–44)
MCHC RBC-ENTMCNC: 24.4 PG — LOW (ref 27–34)
MCHC RBC-ENTMCNC: 31.9 GM/DL — LOW (ref 32–36)
MCV RBC AUTO: 76.5 FL — LOW (ref 80–100)
MONOCYTES # BLD AUTO: 0.49 K/UL — SIGNIFICANT CHANGE UP (ref 0–0.9)
MONOCYTES NFR BLD AUTO: 8.3 % — SIGNIFICANT CHANGE UP (ref 2–14)
NEUTROPHILS # BLD AUTO: 4.41 K/UL — SIGNIFICANT CHANGE UP (ref 1.8–7.4)
NEUTROPHILS NFR BLD AUTO: 74.3 % — SIGNIFICANT CHANGE UP (ref 43–77)
NRBC # BLD: 0 /100 WBCS — SIGNIFICANT CHANGE UP (ref 0–0)
PLATELET # BLD AUTO: 166 K/UL — SIGNIFICANT CHANGE UP (ref 150–400)
POTASSIUM SERPL-MCNC: 3.5 MMOL/L — SIGNIFICANT CHANGE UP (ref 3.5–5.3)
POTASSIUM SERPL-SCNC: 3.5 MMOL/L — SIGNIFICANT CHANGE UP (ref 3.5–5.3)
PROT SERPL-MCNC: 6.7 G/DL — SIGNIFICANT CHANGE UP (ref 6–8.3)
RBC # BLD: 4.47 M/UL — SIGNIFICANT CHANGE UP (ref 3.8–5.2)
RBC # FLD: 19.8 % — HIGH (ref 10.3–14.5)
SODIUM SERPL-SCNC: 146 MMOL/L — HIGH (ref 135–145)
TROPONIN T, HIGH SENSITIVITY RESULT: 22 NG/L — SIGNIFICANT CHANGE UP (ref 0–51)
WBC # BLD: 5.93 K/UL — SIGNIFICANT CHANGE UP (ref 3.8–10.5)
WBC # FLD AUTO: 5.93 K/UL — SIGNIFICANT CHANGE UP (ref 3.8–10.5)

## 2021-12-14 PROCEDURE — 99285 EMERGENCY DEPT VISIT HI MDM: CPT | Mod: GC

## 2021-12-14 PROCEDURE — 93010 ELECTROCARDIOGRAM REPORT: CPT

## 2021-12-14 PROCEDURE — 71045 X-RAY EXAM CHEST 1 VIEW: CPT | Mod: 26

## 2021-12-14 RX ORDER — LEVETIRACETAM 250 MG/1
500 TABLET, FILM COATED ORAL ONCE
Refills: 0 | Status: COMPLETED | OUTPATIENT
Start: 2021-12-14 | End: 2021-12-14

## 2021-12-14 NOTE — ED PROVIDER NOTE - PHYSICAL EXAMINATION
T(C): 36.7 (14 Dec 2021 20:16), Max: 36.7 (14 Dec 2021 20:16)  T(F): 98.1 (14 Dec 2021 20:16), Max: 98.1 (14 Dec 2021 20:16)  HR: 62 (14 Dec 2021 20:16) (62 - 62)  BP: 169/77 (14 Dec 2021 20:16) (169/77 - 169/77)  BP(mean): --  ABP: --  ABP(mean): --  RR: 18 (14 Dec 2021 20:16) (18 - 18)  SpO2: 99% (14 Dec 2021 20:16) (99% - 99%)    GENERAL: Awake, lethargic  HEENT: NC/AT, moist mucous membranes, PERRL, EOMI  LUNGS: CTAB, no wheezes or crackles   CARDIAC: RRR, no m/r/g  ABDOMEN: Soft, , non tender, non distended, no rebound, no guarding  EXT: 1+ edema, no calf tenderness, no deformities.  NEURO:. Moving all extremities.  Full strength of UE 5+, full sensation, Weakness of left lower extremity unable to lift leg off   SKIN: Warm and dry. No rash.  PSYCH: Normal affect.

## 2021-12-14 NOTE — ED PROVIDER NOTE - CARDIOVASCULAR NEGATIVE STATEMENT, MLM
[facility]  [address]  [facility] [address]  Phone: [phone]  Fax: [phone]                  Joi Canchola   2017 6:30 PM   Office Visit    Description:  Female : 2014   Provider:  URGENT CARE, [facility]   Department:  [facility]           Reason for Visit     Insect Bite           Diagnoses this Visit        Comments    Insect bite, initial encounter    -  Primary            To Do List           Goals (5 Years of Data)     None       These Medications        Disp Refills Start End    cetirizine (ZYRTEC) 1 mg/mL syrup 1 Bottle 0 2017    Take 2.5 mLs (2.5 mg total) by mouth once daily. - Oral    Pharmacy: [facility] [address] [place] [address] Ph #: [phone]       mupirocin (BACTROBAN) 2 % ointment 30 g 0 2017    Apply topically 3 (three) times daily. - Topical (Top)    Pharmacy: [facility] [address] [place] [address] Ph #: [phone]         [facility]     [facility] Nurse Care Line -  Assistance  Unless otherwise directed by your provider, please contact [facility], our nurse care line that is available for  assistance.     Registered nurses in the [facility] provide: appointment scheduling, clinical advisement, health education, and other advisory services.  Call: [phone] (toll free)               Medications           START taking these NEW medications        Refills    cetirizine (ZYRTEC) 1 mg/mL syrup 0    Sig: Take 2.5 mLs (2.5 mg total) by mouth once daily.    Class: Normal    Route: Oral    mupirocin (BACTROBAN) 2 % ointment 0    Sig: Apply topically 3 (three) times daily.    Class: Normal    Route: Topical (Top)           Verify that the below list of medications is an accurate representation of the medications you are currently taking.  If none reported, the list may be blank. If  "incorrect, please contact your healthcare provider. Carry this list with you in case of emergency.           Current Medications     cetirizine (ZYRTEC) 1 mg/mL syrup Take 2.5 mLs (2.5 mg total) by mouth once daily.    mupirocin (BACTROBAN) 2 % ointment Apply topically 3 (three) times daily.           Clinical Reference Information           Your Vitals Were     Pulse Temp Resp Height Weight SpO2    113 97.3 °F (36.3 °C) (Tympanic) 20 2' 8.5" (0.826 m) 9.4 kg (20 lb 11.6 oz) 98%    BMI                13.79 kg/m2          Allergies as of 4/17/2017     Lavender (Lavandula Angustifolia)      Immunizations Administered on Date of Encounter - 4/17/2017     None      [facility] Proxy Access     For Parents with an Active [facility] Account, Getting Proxy Access to Your Child's Record is Easy!     Ask your provider's office to daniel you access.    Or     1) Sign into your [facility] account.    2) Fill out the online form under My Account >Family Access.    Don't have a [facility] account? Go to [email or web address], and click New User.     Additional Information  If you have questions, please e-mail [email or web address] or call [phone] to talk to our [facility] staff. Remember, [facility] is NOT to be used for urgent needs. For medical emergencies, dial 911.         Instructions      Insect Bite  Insects most often bite to protect themselves or their nests. Certain bugs, like fleas and mosquitoes, bite to feed. In some cases, the actual bite causes no pain. An itchy red welt or swelling may develop at the site of the bite. Most insect bites do not cause illness. And the itching and swelling most often go away without treatment. However, an infection can develop if the bite is scratched and the skin broken. Rarely, a person may have an allergic reaction to an insect bite.  If a stinger is visible at the bite spot, remove it as quickly as possible, as this can decrease the amount of venom that gets into your body. Scrape it out " with a dull edge, such as the edge of a credit card. Try not to squeeze it. Do not try to dig it out, as you may damage the skin and also increase the chance of infection.     To help reduce swelling and itching, apply a cold pack or ice in a zip-top plastic bag wrapped in a thin towel.   Home care  · Your healthcare provider may prescribe over-the-counter medicines to help relieve itching and swelling. Use each medicine according to the directions on the package. If the bite becomes infected, you will need an antibiotic. This may be in pill form taken by mouth or as an ointment or cream put directly on the skin. Be sure to use them exactly as prescribed.  · Bite symptoms usually go away on their own within a week or two.  · To help prevent infection, avoid scratching or picking at the bite.  · To help relieve itching and swelling, apply ice in a zip-top plastic bag wrapped in a thin towel to the bites. Do this for up to 10 minutes at a time. Avoid hot showers or baths as these tend to make itching worse.  · An over-the-counter anti-itch medicine such as calamine lotion or an antihistamine cream may be helpful.  · If you suspect you have insects in your home, talk to a licensed pest-control professional. He or she can inspect your home and tell you how to get rid of bugs safely.  Follow-up care  Follow up with your healthcare provider, or as advised.  Call 911  Call 911 if any of these occur:  · Trouble breathing or swallowing  · Wheezing  · Feeling like your throat is closing up  · Fainting, loss of consciousness  · Swelling around the face or mouth  When to seek medical advice  Call your healthcare provider right away if any of these occur:  · Fever of 100.4°F (38°C) or higher, or as directed by your healthcare provider  · Signs of infection, such as increased swelling and pain, warmth, red streaks, or drainage from the skin  · Signs of allergic reaction, such as hives, a spreading rash, or throat itching  Date  Last Reviewed: 10/1/2016  © 8037-7768 [employer]. [address] [place] [address], [place] [address]. All rights reserved. This information is not intended as a substitute for professional medical care. Always follow your healthcare professional's instructions.             Language Assistance Services     ATTENTION: Language assistance services are available, free of charge. Please call [phone].      ATENCIÓN: Si habla español, tiene a cooney disposición servicios gratuitos de asistencia lingüística. Llame al [phone].     [facility] Ý: N?u b?n nói Ti?ng Vi?t, có các d?ch v? h? tr? ngôn ng? mi?n phí dành cho b?n. G?i s? [phone].         [facility] complies with applicable Federal civil rights laws and does not discriminate on the basis of race, color, national origin, age, disability, or sex.         no chest pain and no edema.

## 2021-12-14 NOTE — ED PROVIDER NOTE - ATTENDING CONTRIBUTION TO CARE
See MDM above.  patient with bradycardia at primary medical doctor office today for regular visit  of note patient has been more fatigued as of late and doing less activities of daily living   no f/c   no nausea/vomiting   no chest pain shortness of breath   fatigued  +2 lower extremity edema  cooperative  rousable to voice  will get iv, ce, tsh, t4, cbc, cmp, cardiac monitor, fluids, ua, urine culture, straight cath  Will follow up on labs, analgesia, imaging, reassess and disposition to the inpatient team as clinically indicated.   Patient endorsed to the medical team at the time of admission. Based on patient's history and physical exam, as well as the results of today's workup, I feel that patient warrants admission to the hospital for further workup/evaluation and continued management. I discussed the findings of today's workup with the patient and addressed the patient's questions and concerns. The patient was agreeable with admission. Our team spoke with the inpatient receiving team who accepted the patient for admission and subsequently took over the patient's care at the time of admission. The receiving team will follow up on pending labs, analgesia, any clinical imaging results, ancillary findings, reassess, and disposition as clinically indicated. Details of patient and plan conveyed to receiving physician team and conveyed back for understanding. There were no questions at this time about the patient's status, disposition, and plan. Patient's care to be taken over by receiving physician team at this time, all decisions regarding the progression of care will be made at their discretion.

## 2021-12-14 NOTE — ED PROVIDER NOTE - NSICDXPASTMEDICALHX_GEN_ALL_CORE_FT
PAST MEDICAL HISTORY:  Acromegaly     Dementia without behavioral disturbance, unspecified dementia type     HTN (hypertension)

## 2021-12-14 NOTE — ED PROVIDER NOTE - CARE PLAN
Principal Discharge DX:	Bradycardia   1 Principal Discharge DX:	Bradycardia  Secondary Diagnosis:	Lethargy

## 2021-12-14 NOTE — ED PROVIDER NOTE - CLINICAL SUMMARY MEDICAL DECISION MAKING FREE TEXT BOX
86F PMH  HTN, acromegaly, hx breast cancer CC lethargy and bradycardia will get broad work up, cxr, blood work, ecg, UA UC w/ probable admission for bradycardia in the setting of possible heart block

## 2021-12-14 NOTE — ED CLERICAL - NS ED CLERK NOTE PRE-ARRIVAL INFORMATION; ADDITIONAL PRE-ARRIVAL INFORMATION
CC/Reason For referral: Bradycardia  Preferred Consultant(if applicable): N/A  Who admits for you (if needed): N/A  Do you have documents you would like to fax over? No  Would you still like to speak to an ED attending? Yes, please call Dr Julian after patient is seen

## 2021-12-14 NOTE — ED PROVIDER NOTE - OBJECTIVE STATEMENT
86F BIBEMS PMH HTN, acromegaly, hx breast cancer, CC fatigue and bradycardia. PT is accompanied by her son, who lives with her. Son states that she was at her 6 month follow up with Dr Rajni Julian for a cerebral hemorrhage, in the office she was very lethargic and had bradycardia into the 30s, EMS was called, EMS also saw low 30s, but pt stabilize in the ambulance into the 60s. PT currently reports no new pain, but is very lethargic which started approx 2-3 days ago. no known falls / trauma,  denies any CP SOB N/V F,     Not covid vaccinated, no known sick contacts or recent travel, son is vaccinated lives w/ pt.

## 2021-12-14 NOTE — ED PROVIDER NOTE - NSICDXPASTSURGICALHX_GEN_ALL_CORE_FT
PAST SURGICAL HISTORY:  H/O small bowel obstruction     H/O total hysterectomy     History of left mastectomy

## 2021-12-15 DIAGNOSIS — Z86.39 PERSONAL HISTORY OF OTHER ENDOCRINE, NUTRITIONAL AND METABOLIC DISEASE: ICD-10-CM

## 2021-12-15 DIAGNOSIS — J96.02 ACUTE RESPIRATORY FAILURE WITH HYPERCAPNIA: ICD-10-CM

## 2021-12-15 DIAGNOSIS — Z29.9 ENCOUNTER FOR PROPHYLACTIC MEASURES, UNSPECIFIED: ICD-10-CM

## 2021-12-15 DIAGNOSIS — R41.82 ALTERED MENTAL STATUS, UNSPECIFIED: ICD-10-CM

## 2021-12-15 DIAGNOSIS — R00.1 BRADYCARDIA, UNSPECIFIED: ICD-10-CM

## 2021-12-15 DIAGNOSIS — E78.5 HYPERLIPIDEMIA, UNSPECIFIED: ICD-10-CM

## 2021-12-15 DIAGNOSIS — R41.89 OTHER SYMPTOMS AND SIGNS INVOLVING COGNITIVE FUNCTIONS AND AWARENESS: ICD-10-CM

## 2021-12-15 DIAGNOSIS — I10 ESSENTIAL (PRIMARY) HYPERTENSION: ICD-10-CM

## 2021-12-15 DIAGNOSIS — N39.0 URINARY TRACT INFECTION, SITE NOT SPECIFIED: ICD-10-CM

## 2021-12-15 LAB
ALBUMIN SERPL ELPH-MCNC: 3.9 G/DL — SIGNIFICANT CHANGE UP (ref 3.3–5)
ALP SERPL-CCNC: 109 U/L — SIGNIFICANT CHANGE UP (ref 40–120)
ALT FLD-CCNC: 77 U/L — HIGH (ref 10–45)
ANION GAP SERPL CALC-SCNC: 11 MMOL/L — SIGNIFICANT CHANGE UP (ref 5–17)
ANION GAP SERPL CALC-SCNC: 12 MMOL/L — SIGNIFICANT CHANGE UP (ref 5–17)
APPEARANCE UR: CLEAR — SIGNIFICANT CHANGE UP
APTT BLD: 34.8 SEC — SIGNIFICANT CHANGE UP (ref 27.5–35.5)
AST SERPL-CCNC: 58 U/L — HIGH (ref 10–40)
BACTERIA # UR AUTO: ABNORMAL
BASE EXCESS BLDA CALC-SCNC: 8.1 MMOL/L — HIGH (ref -2–3)
BASE EXCESS BLDV CALC-SCNC: 1.3 MMOL/L — SIGNIFICANT CHANGE UP (ref -2–2)
BASOPHILS # BLD AUTO: 0.02 K/UL — SIGNIFICANT CHANGE UP (ref 0–0.2)
BASOPHILS NFR BLD AUTO: 0.3 % — SIGNIFICANT CHANGE UP (ref 0–2)
BILIRUB SERPL-MCNC: 0.5 MG/DL — SIGNIFICANT CHANGE UP (ref 0.2–1.2)
BILIRUB UR-MCNC: NEGATIVE — SIGNIFICANT CHANGE UP
BUN SERPL-MCNC: 12 MG/DL — SIGNIFICANT CHANGE UP (ref 7–23)
BUN SERPL-MCNC: 14 MG/DL — SIGNIFICANT CHANGE UP (ref 7–23)
CA-I SERPL-SCNC: 1.02 MMOL/L — LOW (ref 1.15–1.33)
CALCIUM SERPL-MCNC: 8.8 MG/DL — SIGNIFICANT CHANGE UP (ref 8.4–10.5)
CALCIUM SERPL-MCNC: 8.9 MG/DL — SIGNIFICANT CHANGE UP (ref 8.4–10.5)
CHLORIDE BLDV-SCNC: 82 MMOL/L — LOW (ref 96–108)
CHLORIDE SERPL-SCNC: 104 MMOL/L — SIGNIFICANT CHANGE UP (ref 96–108)
CHLORIDE SERPL-SCNC: 104 MMOL/L — SIGNIFICANT CHANGE UP (ref 96–108)
CO2 BLDA-SCNC: 39 MMOL/L — HIGH (ref 19–24)
CO2 BLDV-SCNC: 34 MMOL/L — HIGH (ref 22–26)
CO2 SERPL-SCNC: 30 MMOL/L — SIGNIFICANT CHANGE UP (ref 22–31)
CO2 SERPL-SCNC: 31 MMOL/L — SIGNIFICANT CHANGE UP (ref 22–31)
COLOR SPEC: YELLOW — SIGNIFICANT CHANGE UP
CREAT SERPL-MCNC: 0.52 MG/DL — SIGNIFICANT CHANGE UP (ref 0.5–1.3)
CREAT SERPL-MCNC: 0.53 MG/DL — SIGNIFICANT CHANGE UP (ref 0.5–1.3)
DIFF PNL FLD: ABNORMAL
EOSINOPHIL # BLD AUTO: 0.03 K/UL — SIGNIFICANT CHANGE UP (ref 0–0.5)
EOSINOPHIL NFR BLD AUTO: 0.5 % — SIGNIFICANT CHANGE UP (ref 0–6)
EPI CELLS # UR: 0 /HPF — SIGNIFICANT CHANGE UP
GAS PNL BLDA: SIGNIFICANT CHANGE UP
GAS PNL BLDV: 129 MMOL/L — LOW (ref 136–145)
GAS PNL BLDV: SIGNIFICANT CHANGE UP
GAS PNL BLDV: SIGNIFICANT CHANGE UP
GLUCOSE BLDV-MCNC: >660 MG/DL — CRITICAL HIGH (ref 70–99)
GLUCOSE SERPL-MCNC: 106 MG/DL — HIGH (ref 70–99)
GLUCOSE SERPL-MCNC: 117 MG/DL — HIGH (ref 70–99)
GLUCOSE UR QL: NEGATIVE — SIGNIFICANT CHANGE UP
HCO3 BLDA-SCNC: 37 MMOL/L — HIGH (ref 21–28)
HCO3 BLDV-SCNC: 31 MMOL/L — HIGH (ref 22–29)
HCT VFR BLD CALC: 32.7 % — LOW (ref 34.5–45)
HCT VFR BLD CALC: 33.6 % — LOW (ref 34.5–45)
HCT VFR BLDA CALC: 26 % — LOW (ref 34.5–46.5)
HGB BLD CALC-MCNC: 8.8 G/DL — LOW (ref 11.7–16.1)
HGB BLD-MCNC: 10.4 G/DL — LOW (ref 11.5–15.5)
HGB BLD-MCNC: 10.7 G/DL — LOW (ref 11.5–15.5)
HOROWITZ INDEX BLDA+IHG-RTO: 50 — SIGNIFICANT CHANGE UP
HOROWITZ INDEX BLDV+IHG-RTO: 50 — SIGNIFICANT CHANGE UP
HYALINE CASTS # UR AUTO: 8 /LPF — HIGH (ref 0–2)
IMM GRANULOCYTES NFR BLD AUTO: 0.6 % — SIGNIFICANT CHANGE UP (ref 0–1.5)
INR BLD: 1.2 RATIO — HIGH (ref 0.88–1.16)
KETONES UR-MCNC: NEGATIVE — SIGNIFICANT CHANGE UP
LACTATE BLDV-MCNC: 0.7 MMOL/L — SIGNIFICANT CHANGE UP (ref 0.7–2)
LEUKOCYTE ESTERASE UR-ACNC: ABNORMAL
LYMPHOCYTES # BLD AUTO: 0.53 K/UL — LOW (ref 1–3.3)
LYMPHOCYTES # BLD AUTO: 8.5 % — LOW (ref 13–44)
MAGNESIUM SERPL-MCNC: 2 MG/DL — SIGNIFICANT CHANGE UP (ref 1.6–2.6)
MCHC RBC-ENTMCNC: 23.9 PG — LOW (ref 27–34)
MCHC RBC-ENTMCNC: 24.2 PG — LOW (ref 27–34)
MCHC RBC-ENTMCNC: 31.8 GM/DL — LOW (ref 32–36)
MCHC RBC-ENTMCNC: 31.8 GM/DL — LOW (ref 32–36)
MCV RBC AUTO: 75 FL — LOW (ref 80–100)
MCV RBC AUTO: 76 FL — LOW (ref 80–100)
MONOCYTES # BLD AUTO: 0.46 K/UL — SIGNIFICANT CHANGE UP (ref 0–0.9)
MONOCYTES NFR BLD AUTO: 7.3 % — SIGNIFICANT CHANGE UP (ref 2–14)
NEUTROPHILS # BLD AUTO: 5.18 K/UL — SIGNIFICANT CHANGE UP (ref 1.8–7.4)
NEUTROPHILS NFR BLD AUTO: 82.8 % — HIGH (ref 43–77)
NITRITE UR-MCNC: POSITIVE
NRBC # BLD: 0 /100 WBCS — SIGNIFICANT CHANGE UP (ref 0–0)
NRBC # BLD: 0 /100 WBCS — SIGNIFICANT CHANGE UP (ref 0–0)
OTHER CELLS CSF MANUAL: 8.1 ML/DL — LOW (ref 18–22)
PCO2 BLDA: 78 MMHG — CRITICAL HIGH (ref 32–45)
PCO2 BLDV: 85 MMHG — HIGH (ref 39–42)
PH BLDA: 7.28 — LOW (ref 7.35–7.45)
PH BLDV: 7.17 — CRITICAL LOW (ref 7.32–7.43)
PH UR: 6.5 — SIGNIFICANT CHANGE UP (ref 5–8)
PHOSPHATE SERPL-MCNC: 4.2 MG/DL — SIGNIFICANT CHANGE UP (ref 2.5–4.5)
PLATELET # BLD AUTO: 121 K/UL — LOW (ref 150–400)
PLATELET # BLD AUTO: 134 K/UL — LOW (ref 150–400)
PO2 BLDA: 126 MMHG — HIGH (ref 83–108)
PO2 BLDV: 38 MMHG — SIGNIFICANT CHANGE UP (ref 25–45)
POTASSIUM BLDV-SCNC: 2.6 MMOL/L — CRITICAL LOW (ref 3.5–5.1)
POTASSIUM SERPL-MCNC: 3.2 MMOL/L — LOW (ref 3.5–5.3)
POTASSIUM SERPL-MCNC: 3.5 MMOL/L — SIGNIFICANT CHANGE UP (ref 3.5–5.3)
POTASSIUM SERPL-SCNC: 3.2 MMOL/L — LOW (ref 3.5–5.3)
POTASSIUM SERPL-SCNC: 3.5 MMOL/L — SIGNIFICANT CHANGE UP (ref 3.5–5.3)
PROCALCITONIN SERPL-MCNC: 0.04 NG/ML — SIGNIFICANT CHANGE UP (ref 0.02–0.1)
PROLACTIN SERPL-MCNC: 9.2 NG/ML — SIGNIFICANT CHANGE UP (ref 3.4–24.1)
PROT SERPL-MCNC: 6.7 G/DL — SIGNIFICANT CHANGE UP (ref 6–8.3)
PROT UR-MCNC: ABNORMAL
PROTHROM AB SERPL-ACNC: 14.3 SEC — HIGH (ref 10.6–13.6)
RAPID RVP RESULT: SIGNIFICANT CHANGE UP
RBC # BLD: 4.3 M/UL — SIGNIFICANT CHANGE UP (ref 3.8–5.2)
RBC # BLD: 4.48 M/UL — SIGNIFICANT CHANGE UP (ref 3.8–5.2)
RBC # FLD: 19.5 % — HIGH (ref 10.3–14.5)
RBC # FLD: 19.6 % — HIGH (ref 10.3–14.5)
RBC CASTS # UR COMP ASSIST: 3 /HPF — SIGNIFICANT CHANGE UP (ref 0–4)
SAO2 % BLDA: 99.8 % — HIGH (ref 94–98)
SAO2 % BLDV: 65 % — LOW (ref 67–88)
SARS-COV-2 RNA SPEC QL NAA+PROBE: SIGNIFICANT CHANGE UP
SODIUM SERPL-SCNC: 145 MMOL/L — SIGNIFICANT CHANGE UP (ref 135–145)
SODIUM SERPL-SCNC: 147 MMOL/L — HIGH (ref 135–145)
SP GR SPEC: 1.03 — HIGH (ref 1.01–1.02)
T4 AB SER-ACNC: 7.3 UG/DL — SIGNIFICANT CHANGE UP (ref 4.6–12)
TROPONIN T, HIGH SENSITIVITY RESULT: 21 NG/L — SIGNIFICANT CHANGE UP (ref 0–51)
TSH SERPL-MCNC: 0.65 UIU/ML — SIGNIFICANT CHANGE UP (ref 0.27–4.2)
UROBILINOGEN FLD QL: ABNORMAL
WBC # BLD: 6.26 K/UL — SIGNIFICANT CHANGE UP (ref 3.8–10.5)
WBC # BLD: 7.19 K/UL — SIGNIFICANT CHANGE UP (ref 3.8–10.5)
WBC # FLD AUTO: 6.26 K/UL — SIGNIFICANT CHANGE UP (ref 3.8–10.5)
WBC # FLD AUTO: 7.19 K/UL — SIGNIFICANT CHANGE UP (ref 3.8–10.5)
WBC UR QL: 68 /HPF — HIGH (ref 0–5)

## 2021-12-15 PROCEDURE — 99222 1ST HOSP IP/OBS MODERATE 55: CPT | Mod: GC

## 2021-12-15 PROCEDURE — 93306 TTE W/DOPPLER COMPLETE: CPT | Mod: 26

## 2021-12-15 PROCEDURE — 93010 ELECTROCARDIOGRAM REPORT: CPT

## 2021-12-15 PROCEDURE — 70450 CT HEAD/BRAIN W/O DYE: CPT | Mod: 26

## 2021-12-15 PROCEDURE — 93010 ELECTROCARDIOGRAM REPORT: CPT | Mod: 77

## 2021-12-15 PROCEDURE — 70450 CT HEAD/BRAIN W/O DYE: CPT | Mod: 26,77

## 2021-12-15 PROCEDURE — 99223 1ST HOSP IP/OBS HIGH 75: CPT

## 2021-12-15 RX ORDER — ACETAMINOPHEN 500 MG
650 TABLET ORAL EVERY 6 HOURS
Refills: 0 | Status: DISCONTINUED | OUTPATIENT
Start: 2021-12-15 | End: 2021-12-15

## 2021-12-15 RX ORDER — MEMANTINE HYDROCHLORIDE 10 MG/1
10 TABLET ORAL DAILY
Refills: 0 | Status: DISCONTINUED | OUTPATIENT
Start: 2021-12-15 | End: 2021-12-15

## 2021-12-15 RX ORDER — POTASSIUM CHLORIDE 20 MEQ
10 PACKET (EA) ORAL
Refills: 0 | Status: COMPLETED | OUTPATIENT
Start: 2021-12-15 | End: 2021-12-15

## 2021-12-15 RX ORDER — LEVETIRACETAM 250 MG/1
500 TABLET, FILM COATED ORAL
Refills: 0 | Status: DISCONTINUED | OUTPATIENT
Start: 2021-12-15 | End: 2021-12-15

## 2021-12-15 RX ORDER — PIPERACILLIN AND TAZOBACTAM 4; .5 G/20ML; G/20ML
3.38 INJECTION, POWDER, LYOPHILIZED, FOR SOLUTION INTRAVENOUS ONCE
Refills: 0 | Status: COMPLETED | OUTPATIENT
Start: 2021-12-15 | End: 2021-12-15

## 2021-12-15 RX ORDER — ROSUVASTATIN CALCIUM 5 MG/1
1 TABLET ORAL
Qty: 0 | Refills: 0 | DISCHARGE

## 2021-12-15 RX ORDER — ESCITALOPRAM OXALATE 10 MG/1
1 TABLET, FILM COATED ORAL
Qty: 0 | Refills: 0 | DISCHARGE

## 2021-12-15 RX ORDER — HYDRALAZINE HCL 50 MG
10 TABLET ORAL
Refills: 0 | Status: DISCONTINUED | OUTPATIENT
Start: 2021-12-15 | End: 2021-12-15

## 2021-12-15 RX ORDER — PIPERACILLIN AND TAZOBACTAM 4; .5 G/20ML; G/20ML
3.38 INJECTION, POWDER, LYOPHILIZED, FOR SOLUTION INTRAVENOUS EVERY 8 HOURS
Refills: 0 | Status: COMPLETED | OUTPATIENT
Start: 2021-12-15 | End: 2021-12-22

## 2021-12-15 RX ORDER — CHLORHEXIDINE GLUCONATE 213 G/1000ML
1 SOLUTION TOPICAL
Refills: 0 | Status: DISCONTINUED | OUTPATIENT
Start: 2021-12-15 | End: 2021-12-27

## 2021-12-15 RX ORDER — IRBESARTAN 75 MG/1
1 TABLET ORAL
Qty: 0 | Refills: 0 | DISCHARGE

## 2021-12-15 RX ORDER — CLOBAZAM 10 MG/1
10 TABLET ORAL EVERY 12 HOURS
Refills: 0 | Status: DISCONTINUED | OUTPATIENT
Start: 2021-12-15 | End: 2021-12-15

## 2021-12-15 RX ORDER — FUROSEMIDE 40 MG
20 TABLET ORAL ONCE
Refills: 0 | Status: COMPLETED | OUTPATIENT
Start: 2021-12-15 | End: 2021-12-15

## 2021-12-15 RX ORDER — CLOBAZAM 10 MG/1
20 TABLET ORAL ONCE
Refills: 0 | Status: DISCONTINUED | OUTPATIENT
Start: 2021-12-15 | End: 2021-12-15

## 2021-12-15 RX ORDER — LEVETIRACETAM 250 MG/1
1 TABLET, FILM COATED ORAL
Qty: 0 | Refills: 0 | DISCHARGE

## 2021-12-15 RX ORDER — FOLIC ACID 0.8 MG
1 TABLET ORAL
Qty: 0 | Refills: 0 | DISCHARGE

## 2021-12-15 RX ORDER — ATORVASTATIN CALCIUM 80 MG/1
80 TABLET, FILM COATED ORAL AT BEDTIME
Refills: 0 | Status: DISCONTINUED | OUTPATIENT
Start: 2021-12-15 | End: 2021-12-15

## 2021-12-15 RX ORDER — FUROSEMIDE 40 MG
40 TABLET ORAL ONCE
Refills: 0 | Status: COMPLETED | OUTPATIENT
Start: 2021-12-15 | End: 2021-12-15

## 2021-12-15 RX ORDER — ESOMEPRAZOLE MAGNESIUM 40 MG/1
1 CAPSULE, DELAYED RELEASE ORAL
Qty: 0 | Refills: 0 | DISCHARGE

## 2021-12-15 RX ORDER — LEVETIRACETAM 250 MG/1
500 TABLET, FILM COATED ORAL EVERY 12 HOURS
Refills: 0 | Status: DISCONTINUED | OUTPATIENT
Start: 2021-12-15 | End: 2021-12-18

## 2021-12-15 RX ORDER — ENOXAPARIN SODIUM 100 MG/ML
40 INJECTION SUBCUTANEOUS DAILY
Refills: 0 | Status: DISCONTINUED | OUTPATIENT
Start: 2021-12-15 | End: 2021-12-27

## 2021-12-15 RX ORDER — UBIDECARENONE 100 MG
1 CAPSULE ORAL
Qty: 0 | Refills: 0 | DISCHARGE

## 2021-12-15 RX ORDER — FESOTERODINE FUMARATE 8 MG/1
1 TABLET, FILM COATED, EXTENDED RELEASE ORAL
Qty: 0 | Refills: 0 | DISCHARGE

## 2021-12-15 RX ORDER — AMLODIPINE BESYLATE 2.5 MG/1
5 TABLET ORAL DAILY
Refills: 0 | Status: DISCONTINUED | OUTPATIENT
Start: 2021-12-15 | End: 2021-12-15

## 2021-12-15 RX ADMIN — PIPERACILLIN AND TAZOBACTAM 25 GRAM(S): 4; .5 INJECTION, POWDER, LYOPHILIZED, FOR SOLUTION INTRAVENOUS at 21:58

## 2021-12-15 RX ADMIN — LEVETIRACETAM 400 MILLIGRAM(S): 250 TABLET, FILM COATED ORAL at 00:28

## 2021-12-15 RX ADMIN — Medication 100 MILLIEQUIVALENT(S): at 21:58

## 2021-12-15 RX ADMIN — Medication 100 MILLIEQUIVALENT(S): at 19:42

## 2021-12-15 RX ADMIN — PIPERACILLIN AND TAZOBACTAM 25 GRAM(S): 4; .5 INJECTION, POWDER, LYOPHILIZED, FOR SOLUTION INTRAVENOUS at 15:04

## 2021-12-15 RX ADMIN — PIPERACILLIN AND TAZOBACTAM 200 GRAM(S): 4; .5 INJECTION, POWDER, LYOPHILIZED, FOR SOLUTION INTRAVENOUS at 06:52

## 2021-12-15 RX ADMIN — LEVETIRACETAM 400 MILLIGRAM(S): 250 TABLET, FILM COATED ORAL at 17:14

## 2021-12-15 RX ADMIN — Medication 40 MILLIGRAM(S): at 17:14

## 2021-12-15 RX ADMIN — ENOXAPARIN SODIUM 40 MILLIGRAM(S): 100 INJECTION SUBCUTANEOUS at 17:14

## 2021-12-15 RX ADMIN — Medication 100 MILLIEQUIVALENT(S): at 20:44

## 2021-12-15 NOTE — H&P ADULT - PROBLEM SELECTOR PLAN 1
The patient is bradycardic to 30s-40s, sinus with PVCs.   Monitor on telemetry. Hold metoprolol. Avoid AV irene blockers for now.  Check TTE.  Follow up TSH and free T4.  Notify Dr. Carlson in AM.

## 2021-12-15 NOTE — ED ADULT NURSE NOTE - OBJECTIVE STATEMENT
87 y/o female BIBEMS c/o bradycardia. As per EMS report "Pt coming from PCP MD Julian, was seen for F/U appt. Pt has been lethargic, and experiencing generalized weakness for a few days, MD Julian called EMS due to pt lethargy and weakness and found to be bradycardic to the 30s which is not being pt baseline, 20 G IV placed in L. forearm, R. arm masectomy due to breast CA, PMHx nontraumatic hemorrage w/ no deficits, RA sat 85% pt placed on 3 L NC and tolerating well". Pt presents to [facility] A&Ox2-3, pt placed on continuous cardiac monitoring and pulse oximetry, pt on 3 L NC and tolerating well, 18 G IV placed in L. AC by ER RN, pink band applied to R. arm due to masectomy due to breast CA. Pt son at bedside endorsing pt experiencing generalized weakness and lethargy x 2-3 days. PMHx HTN, acromegaly, Breast CA. Pt denies chest pain, SOB/difficulty breathing, fever/chills, HA, abd pain, N/V/D, lightheadedness/dizziness, numbness/tingling. Pt A&Ox3, IV locked and patent, pt instructed on use of call bell, bed locked and in lowest position.

## 2021-12-15 NOTE — H&P ADULT - NSHPLABSRESULTS_GEN_ALL_CORE
Labs, personally reviewed by me.     LABS:                        10.9   5.93  )-----------( 166      ( 14 Dec 2021 21:13 )             34.2     Hgb Trend: 10.9<--  12-14    146<H>  |  105  |  17  ----------------------------<  108<H>  3.5   |  32<H>  |  0.68    Ca    9.3      14 Dec 2021 21:13  Phos  4.1     12-14  Mg     2.3     12-14    TPro  6.7  /  Alb  4.0  /  TBili  0.4  /  DBili  x   /  AST  133<H>  /  ALT  84<H>  /  AlkPhos  114  12-14    Creatinine Trend: 0.68<-- Labs, EKG, CXR personally reviewed by me.     CBC found microcytic anemia 10.9, MCV 76.5 which son states is chronic.   Troponin 22, 21.  CMP found elevated serum Na 146. AST elevated 133, ALT 84.  RVP/COVID19 is negative.    EKG sinus HR 61, with PVCs, normal axis, QTc 400    LABS:                        10.9   5.93  )-----------( 166      ( 14 Dec 2021 21:13 )             34.2     Hgb Trend: 10.9<--  12-14    146<H>  |  105  |  17  ----------------------------<  108<H>  3.5   |  32<H>  |  0.68    Ca    9.3      14 Dec 2021 21:13  Phos  4.1     12-14  Mg     2.3     12-14    TPro  6.7  /  Alb  4.0  /  TBili  0.4  /  DBili  x   /  AST  133<H>  /  ALT  84<H>  /  AlkPhos  114  12-14    Creatinine Trend: 0.68<--    < from: Xray Chest 1 View- PORTABLE-Urgent (Xray Chest 1 View- PORTABLE-Urgent .) (12.14.21 @ 22:39) >    ******PRELIMINARY REPORT******   INTERPRETATION:  No emergent findings.    < end of copied text >

## 2021-12-15 NOTE — CHART NOTE - NSCHARTNOTEFT_GEN_A_CORE
[facility] to accept patient. Full note to follow.    Santhosh Lopes MD  Internal Medicine Resident  [phone] [facility]  [address] [facility]

## 2021-12-15 NOTE — ED ADULT NURSE REASSESSMENT NOTE - NS ED NURSE REASSESS COMMENT FT1
As per MD order, w/ 2 RN's at bedside to confirm sterility, straight catheterization performed utilizing sterile technique, draining 200 mL of clear/yellow urine, pt repositioned and changed in bed As per MD order, w/ 2 RN's at bedside to confirm sterility, straight catheterization performed utilizing sterile technique, draining 200 mL of clear/yellow urine, pt repositioned and changed in bed, pt lethargic but A&O2 at this time, denies pain/discomfort, son at bedside for support

## 2021-12-15 NOTE — RAPID RESPONSE TEAM SUMMARY - NSOTHERINTERVENTIONSRRT_GEN_ALL_CORE
Neuro consulted, repeat CT head non-con ordered, both neurology and cardiology at bedside. CT head being performed now. Notified primary team, f/u CBC, CMP, repeat ABG. Followup cardiology and neurology recs.  Neuro consulted, repeat CT head non-con ordered, both neurology and cardiology at bedside. CT head being performed now. Notified primary team, f/u CBC, CMP, repeat ABG, monitor mental status. Followup cardiology and neurology recs.

## 2021-12-15 NOTE — CONSULT NOTE ADULT - ASSESSMENT
87 y/o F with PMH of acromegaly, HTN, breast CA s/p L mastectomy. Presents to ED with c/o fatigue and bradycardia. Found to have +UA, started on abx. 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

## 2021-12-15 NOTE — CONSULT NOTE ADULT - SUBJECTIVE AND OBJECTIVE BOX
Santhosh Lopes MD  Internal Medicine Resident  [phone]    PATIENT:  TONIA MAJANO  [id number]    CHIEF COMPLAINT:  Patient is a 86y old  Female who presents with a chief complaint of bradycardia (15 Dec 2021 13:06)    INTERVAL HISTORY/OVERNIGHT EVENTS:  85 y/o F with PMH of acromegaly, HTN, breast CA s/p L mastectomy. Presents to ED with c/o fatigue and bradycardia. Patient has had a progressive decline in activity over the past few weeks - she has been sleeping more, eating less, ambulating more slowly and intermittent confusion. Found to have +UA, started on abx. 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 Unable to obtain ROS, pt lethargic on bipap.     PAST MEDICAL & SURGICAL HISTORY:  HTN (hypertension)  Dementia without behavioral disturbance, unspecified dementia type  Acromegaly  History of left mastectomy  H/O total hysterectomy  H/O small bowel obstruction    Allergies  No Known Allergies    FAMILY HISTORY:  Family history of diabetes mellitus (DM) (Mother, Sibling)    Social history: former smoker     Review of Systems: unable to obtain 2nd to mental status     MEDICATIONS:  MEDICATIONS  (STANDING):  amLODIPine   Tablet 5 milliGRAM(s) Oral daily  atorvastatin 80 milliGRAM(s) Oral at bedtime  enoxaparin Injectable 40 milliGRAM(s) SubCutaneous daily  furosemide   Injectable 40 milliGRAM(s) IV Push once  hydrALAZINE Injectable 10 milliGRAM(s) IV Push four times a day  levETIRAcetam 500 milliGRAM(s) Oral two times a day  memantine 10 milliGRAM(s) Oral daily  piperacillin/tazobactam IVPB.. 3.375 Gram(s) IV Intermittent every 8 hours    MEDICATIONS  (PRN):  acetaminophen     Tablet .. 650 milliGRAM(s) Oral every 6 hours PRN Temp greater or equal to 38C (100.4F), Mild Pain (1 - 3)    ALLERGIES:  Allergies    No Known Allergies    Intolerances    OBJECTIVE:  ICU Vital Signs Last 24 Hrs  T(C): 37 (15 Dec 2021 10:28), Max: 37 (15 Dec 2021 10:28)  T(F): 98.6 (15 Dec 2021 10:28), Max: 98.6 (15 Dec 2021 10:28)  HR: 65 (15 Dec 2021 10:28) (43 - 70)  BP: 165/78 (15 Dec 2021 10:28) (165/78 - 189/76)  RR: 19 (15 Dec 2021 10:28) (17 - 19)  SpO2: 97% (15 Dec 2021 10:28) (95% - 99%)    POCT Blood Glucose.: 130 mg/dL (15 Dec 2021 05:20)  POCT Blood Glucose.: 130 mg/dL (15 Dec 2021 05:20)    Daily Height in cm: 172.72 (14 Dec 2021 20:16)      PHYSICAL EXAMINATION:  General: non-distressed  HEENT: pupils equal  Neurology: arousable to voice, follows commands, GCS is 11 [id number], MAGANA  Respiratory: clear bilaterally, mild diminishment R base, no wheezing. L breast implant.  CV: RRR, S1S2, no murmurs, rubs or gallops  Abdominal: Soft, NT, ND +BS  Extremities: No edema, +peripheral pulses    LABS:  ABG - ( 15 Dec 2021 13:33 )  pH, Arterial: 7.28  pH, Blood: x     /  pCO2: 78    /  pO2: 126   / HCO3: 37    / Base Excess: 8.1   /  SaO2: 99.8                          10.7   7.19  )-----------( 134      ( 15 Dec 2021 05:51 )             33.6     12-15    147<H>  |  104  |  14  ----------------------------<  117<H>  3.5   |  31  |  0.52    Ca    8.8      15 Dec 2021 05:51  Phos  4.1     12-14  Mg     2.3     12-14    TPro  6.7  /  Alb  4.0  /  TBili  0.4  /  DBili  x   /  AST  133<H>  /  ALT  84<H>  /  AlkPhos  114  12-14    LIVER FUNCTIONS - ( 14 Dec 2021 21:13 )  Alb: 4.0 g/dL / Pro: 6.7 g/dL / ALK PHOS: 114 U/L / ALT: 84 U/L / AST: 133 U/L / GGT: x           Urinalysis Basic - ( 15 Dec 2021 03:07 )    Color: Yellow / Appearance: Clear / S.026 / pH: x  Gluc: x / Ketone: Negative  / Bili: Negative / Urobili: 3 mg/dL   Blood: x / Protein: 100 mg/dL / Nitrite: Positive   Leuk Esterase: Moderate / RBC: 3 /hpf / WBC 68 /HPF   Sq Epi: x / Non Sq Epi: 0 /hpf / Bacteria: Many

## 2021-12-15 NOTE — H&P ADULT - NSHPSOCIALHISTORY_GEN_ALL_CORE
The patient is . She was  for 58 years, but her  passed in 2017. After her  passed, her son moved her from [place] to move with him.  The patient denies tobacco or alcohol use.   She uses a walker when ambulating outside the home.

## 2021-12-15 NOTE — CONSULT NOTE ADULT - SUBJECTIVE AND OBJECTIVE BOX
PULMONARY CONSULT    HPI: 87 y/o F with PMH of acromegaly, HTN, breast CA s/p L mastectomy. Presents to ED with c/o fatigue and bradycardia. Patient has had a progressive decline in activity over the past few weeks - she has been sleeping more, eating less, ambulating more slowly and intermittent confusion. Found to have +UA, started on abx. 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 Unable to obtain ROS, pt lethargic on bipap.       PAST MEDICAL & SURGICAL HISTORY:  HTN (hypertension)  Dementia without behavioral disturbance, unspecified dementia type  Acromegaly  History of left mastectomy  H/O total hysterectomy  H/O small bowel obstruction    Allergies  No Known Allergies    FAMILY HISTORY:  Family history of diabetes mellitus (DM) (Mother, Sibling)      Social history: former smoker     Review of Systems: unable to obtain 2nd to mental status     Medications:  MEDICATIONS  (STANDING):  amLODIPine   Tablet 5 milliGRAM(s) Oral daily  atorvastatin 80 milliGRAM(s) Oral at bedtime  cloBAZam 10 milliGRAM(s) Oral every 12 hours  enoxaparin Injectable 40 milliGRAM(s) SubCutaneous daily  hydrALAZINE Injectable 10 milliGRAM(s) IV Push four times a day  levETIRAcetam 500 milliGRAM(s) Oral two times a day  memantine 10 milliGRAM(s) Oral daily  piperacillin/tazobactam IVPB.. 3.375 Gram(s) IV Intermittent every 8 hours    MEDICATIONS  (PRN):  acetaminophen     Tablet .. 650 milliGRAM(s) Oral every 6 hours PRN Temp greater or equal to 38C (100.4F), Mild Pain (1 - 3)            Vital Signs Last 24 Hrs  T(C): 37 (15 Dec 2021 10:28), Max: 37 (15 Dec 2021 10:28)  T(F): 98.6 (15 Dec 2021 10:28), Max: 98.6 (15 Dec 2021 10:28)  HR: 65 (15 Dec 2021 10:28) (43 - 70)  BP: 165/78 (15 Dec 2021 10:28) (165/78 - 189/76)  BP(mean): --  RR: 19 (15 Dec 2021 10:28) (17 - 19)  SpO2: 97% (15 Dec 2021 10:28) (95% - 99%)    ABG - ( 15 Dec 2021 05:40 )  pH, Arterial: 7.28  pH, Blood: x     /  pCO2: 79    /  pO2: 81    / HCO3: 37    / Base Excess: 8.0   /  SaO2: 97.3                        LABS:                        10.7   7.19  )-----------( 134      ( 15 Dec 2021 05:51 )             33.6     12-15    147<H>  |  104  |  14  ----------------------------<  117<H>  3.5   |  31  |  0.52    Ca    8.8      15 Dec 2021 05:51  Phos  4.1     -  Mg     2.3     -    TPro  6.7  /  Alb  4.0  /  TBili  0.4  /  DBili  x   /  AST  133<H>  /  ALT  84<H>  /  AlkPhos  114  12-14          CAPILLARY BLOOD GLUCOSE      POCT Blood Glucose.: 130 mg/dL (15 Dec 2021 05:20)      Urinalysis Basic - ( 15 Dec 2021 03:07 )    Color: Yellow / Appearance: Clear / S.026 / pH: x  Gluc: x / Ketone: Negative  / Bili: Negative / Urobili: 3 mg/dL   Blood: x / Protein: 100 mg/dL / Nitrite: Positive   Leuk Esterase: Moderate / RBC: 3 /hpf / WBC 68 /HPF   Sq Epi: x / Non Sq Epi: 0 /hpf / Bacteria: Many      Procalcitonin, Serum: 0.04 ng/mL (12-15-21 @ 05:51)          Physical Examination:    General: No acute distress, on bipap     HEENT: Pupils equal, reactive to light.  Symmetric.    PULM: trace crackles at bases     CVS: S1, S2    ABD: Soft, nondistended, nontender, normoactive bowel sounds, no masses    EXT: No edema, nontender    SKIN: Warm and well perfused, no rashes noted.    NEURO: lethargic, oriented x 1      RADIOLOGY REVIEWED  CXR: pulm edema

## 2021-12-15 NOTE — CONSULT NOTE ADULT - SUBJECTIVE AND OBJECTIVE BOX
CHIEF COMPLAINT:Patient is a 86y old  Female who presents with a chief complaint of bradycardia (15 Dec 2021 00:23)      HISTORY OF PRESENT ILLNESS:    86 female with history as below, known to me from office, also history of CVA   has been having confusion , decreased mental status, noted to have HR 30s but EKG showing ventricular bigeminy   now s/p RRT for AMS  ABG showing high PCO2 with respiratory acidosis  ROS limited  Son at bedside     PAST MEDICAL & SURGICAL HISTORY:  HTN (hypertension)    Dementia without behavioral disturbance, unspecified dementia type    Acromegaly    History of left mastectomy    H/O total hysterectomy    H/O small bowel obstruction            MEDICATIONS:  amLODIPine   Tablet 5 milliGRAM(s) Oral daily  enoxaparin Injectable 40 milliGRAM(s) SubCutaneous daily    piperacillin/tazobactam IVPB. 3.375 Gram(s) IV Intermittent once      acetaminophen     Tablet .. 650 milliGRAM(s) Oral every 6 hours PRN  levETIRAcetam 500 milliGRAM(s) Oral two times a day  memantine 10 milliGRAM(s) Oral daily      atorvastatin 80 milliGRAM(s) Oral at bedtime        FAMILY HISTORY:  Family history of diabetes mellitus (DM) (Mother, Sibling)        Non-contributory    SOCIAL HISTORY:    No tobacco, drugs or etoh    Allergies    No Known Allergies    Intolerances    	    REVIEW OF SYSTEMS:  as above  The rest of the 14 points ROS reviewed and except above they are unremarkable.        PHYSICAL EXAM:  T(C): 36.7 (12-14-21 @ 20:16), Max: 36.7 (12-14-21 @ 20:16)  HR: 70 (12-15-21 @ 04:52) (43 - 70)  BP: 180/86 (12-15-21 @ 04:52) (169/77 - 189/76)  RR: 18 (12-15-21 @ 04:52) (17 - 18)  SpO2: 97% (12-15-21 @ 04:52) (95% - 99%)  Wt(kg): --  I&O's Summary      JVP: Normal  Neck: supple  Lung: clear   CV: S1 S2 , Murmur:  Abd: soft  Ext: No edema  neuro: obtunded   Psych: flat affect  Skin: normal      LABS/DATA:    TELEMETRY: 	    ECG:  	   	  CARDIAC MARKERS:                        21 <<== 12-14-21 @ 23:36                22 <<== 12-14-21 @ 21:13                              10.7   7.19  )-----------( 134      ( 15 Dec 2021 05:51 )             33.6     12-14    146<H>  |  105  |  17  ----------------------------<  108<H>  3.5   |  32<H>  |  0.68    Ca    9.3      14 Dec 2021 21:13  Phos  4.1     12-14  Mg     2.3     12-14    TPro  6.7  /  Alb  4.0  /  TBili  0.4  /  DBili  x   /  AST  133<H>  /  ALT  84<H>  /  AlkPhos  114  12-14    proBNP:   Lipid Profile:   HgA1c:   TSH: Thyroid Stimulating Hormone, Serum: 0.65 uIU/mL (12-15 @ 03:46)

## 2021-12-15 NOTE — H&P ADULT - PROBLEM SELECTOR PLAN 2
BP elevated. Hold metoprolol succinate due to bradycardia.  Will give amlodipine 5mg PO with hold parameters.

## 2021-12-15 NOTE — PROGRESS NOTE ADULT - SUBJECTIVE AND OBJECTIVE BOX
Name of Patient : TONIA MAJANO  MRN: [id number]  Date of visit: 12-15-21       Subjective: Patient seen and examined. No new events except as noted.   dyspnia  avapp  SOB     REVIEW OF SYSTEMS:    CONSTITUTIONAL: + weakness  EYES/ENT: No visual changes;  No vertigo or throat pain   NECK: No pain or stiffness  RESPIRATORY: SOB   CARDIOVASCULAR: No chest pain or palpitations  GASTROINTESTINAL: No abdominal or epigastric pain. No nausea, vomiting, or hematemesis; No diarrhea or constipation. No melena or hematochezia.  GENITOURINARY: No dysuria, frequency or hematuria  NEUROLOGICAL: No numbness or weakness  SKIN: No itching, burning, rashes, or lesions   All other review of systems is negative unless indicated above.    MEDICATIONS:  MEDICATIONS  (STANDING):  chlorhexidine 4% Liquid 1 Application(s) Topical <User Schedule>  enoxaparin Injectable 40 milliGRAM(s) SubCutaneous daily  levETIRAcetam  IVPB 500 milliGRAM(s) IV Intermittent every 12 hours  piperacillin/tazobactam IVPB.. 3.375 Gram(s) IV Intermittent every 8 hours      PHYSICAL EXAM:  T(C): 36.9 (12-15-21 @ 20:00), Max: 37 (12-15-21 @ 10:28)  HR: 50 (12-15-21 @ 22:00) (43 - 70)  BP: 174/73 (12-15-21 @ 22:00) (124/58 - 189/76)  RR: 18 (12-15-21 @ 22:00) (15 - )  SpO2: 99% (12-15-21 @ 22:00) (95% - 100%)  Wt(kg): --  I&O's Summary    15 Dec 2021 07:01  -  15 Dec 2021 22:40  --------------------------------------------------------  IN: 550 mL / OUT: 600 mL / NET: -50 mL          Appearance: Normal	  HEENT:  PERRLA   Lymphatic: No lymphadenopathy   Cardiovascular: Normal S1 S2, no JVD  Respiratory: normal effort , clear  Gastrointestinal:  Soft, Non-tender  Skin: No rashes,  warm to touch  Psychiatry:  Mood & affect appropriate  Musculuskeletal: No edema      All labs, Imaging and EKGs personally reviewed       12-15-21 @ 07:01  -  12-15-21 @ 22:40  --------------------------------------------------------  IN: 550 mL / OUT: 600 mL / NET: -50 mL                          10.4   6.26  )-----------( 121      ( 15 Dec 2021 18:52 )             32.7               12-15    145  |  104  |  12  ----------------------------<  106<H>  3.2<L>   |  30  |  0.53    Ca    8.9      15 Dec 2021 18:52  Phos  4.2     12-15  Mg     2.0     12-15    TPro  6.7  /  Alb  3.9  /  TBili  0.5  /  DBili  x   /  AST  58<H>  /  ALT  77<H>  /  AlkPhos  109  12-15    PT/INR - ( 15 Dec 2021 18:52 )   PT: 14.3 sec;   INR: 1.20 ratio         PTT - ( 15 Dec 2021 18:52 )  PTT:34.8 sec                 ABG - ( 15 Dec 2021 18:50 )  pH, Arterial: 7.32  pH, Blood: x     /  pCO2: 74    /  pO2: 152   / HCO3: 38    / Base Excess: 9.7   /  SaO2: 99.7              Urinalysis Basic - ( 15 Dec 2021 03:07 )    Color: Yellow / Appearance: Clear / S.026 / pH: x  Gluc: x / Ketone: Negative  / Bili: Negative / Urobili: 3 mg/dL   Blood: x / Protein: 100 mg/dL / Nitrite: Positive   Leuk Esterase: Moderate / RBC: 3 /hpf / WBC 68 /HPF   Sq Epi: x / Non Sq Epi: 0 /hpf / Bacteria: Many

## 2021-12-15 NOTE — RAPID RESPONSE TEAM SUMMARY - NSSITUATIONBACKGROUNDRRT_GEN_ALL_CORE
85 y/o F with PMH of acromegaly, HTN, breast CA s/p L mastectomy. Presents to ED with c/o fatigue and bradycardia. Found to have +UA, started on abx. 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

## 2021-12-15 NOTE — PROGRESS NOTE ADULT - PROBLEM SELECTOR PLAN 1
The patient is bradycardic to 30s-40s, sinus with PVCs.   Monitor on telemetry. Hold metoprolol. Avoid AV irene blockers for now.  Check TTE.  Follow up TSH and free T4.  card follow up appreciated   hypercapnia  avap  pulm eval   low threshold for ICU

## 2021-12-15 NOTE — H&P ADULT - PROBLEM SELECTOR PLAN 3
Patient has regular follow up with PMD Dr. Julian and Dr. Paulino.  Continue keppra 500mg BID.   Start seizure prophylaxis.

## 2021-12-15 NOTE — CONSULT NOTE ADULT - PROBLEM SELECTOR RECOMMENDATION 9
unclear cause at this time - ?2nd to underlying infectious process, underlying neurological event  -CXR with congestion, pro-BNP added to AM labs. Minimal crackles on exam.   -CT head with chronic bilateral basal ganglia infarct. Eventual MR head  -Will plan for CT chest once stable.   -On abx for UTI  -ABG this AM 7.28/79/81/37. Currently on AVAPS, PIP 29 with low TV at times. Settings changed as follows: , min pressure 18, max pressure 35, EPAP 8, FiO2 40%. Check ABG.   -Concerned pt very lethargic, only triggering about 50% of breaths. F/u ABG.   -Will hold off on steroids at this time, pt with no wheezing on exam. unclear cause at this time - ?2nd to underlying infectious process, underlying neurological event  -CXR with congestion, pro-BNP added to AM labs. Minimal crackles on exam.   -CT head with chronic bilateral basal ganglia infarct. Eventual MR head  -Will plan for CT chest once stable.   -On abx for UTI  -ABG this AM 7.28/79/81/37. Currently on AVAPS, PIP 29 with low TV at times. Settings changed as follows: , min pressure 18, max pressure 35, EPAP 8, FiO2 40%. Check ABG.   -Concerned pt very lethargic, only triggering about 30% of breaths. F/u ABG.   -Will hold off on steroids at this time, pt with no wheezing on exam.  -Suggest MICU consult unclear cause at this time - ?2nd to underlying infectious process, underlying neurological event  -CXR with congestion, pro-BNP added to AM labs. Minimal crackles on exam.   -CT head with chronic bilateral basal ganglia infarct. Eventual MR head  -Will plan for CT chest once stable.   -On abx for UTI  -ABG this AM 7.28/79/81/37. Currently on AVAPS, PIP 29 with low TV at times. Settings changed as follows: , min pressure 18, max pressure 35, EPAP 8, FiO2 40%. Check ABG.   -Concerned pt very lethargic, only triggering about 30% of breaths. F/u ABG.   -Will hold off on steroids at this time, pt with no wheezing on exam.  -Suggest lasix 40mg ivp x 1  -Suggest MICU consult

## 2021-12-15 NOTE — CHART NOTE - NSCHARTNOTEFT_GEN_A_CORE
Medicine NP Episodic Note    Pt. s/p RRT 2/2 to AMS.  Please refer to RRT sheet for full details.  ABG was obtained during RRT notable for pH 7.28, pCO2 79 - pt. maintained on AVAPS for now   - F/u repeat ABG   - F/u urgent CTH  - Neurology on board   UA positive nitrite, leukocytes, bacteria, s/p Zosyn during RRT  - Case discussed w/ Dr. Polanco (attending), will continue Zosyn IVPB for now  - F/u urine cx    - F/u Bcx  Endorsed to primary team to f/u.  Attending to follow.    Kylie Bran, MARIANOP-BC  [phone]

## 2021-12-15 NOTE — CHART NOTE - NSCHARTNOTEFT_GEN_A_CORE
MICU Accept Note    TONIA MAJANO  86y  Patient is a 86y old  Female who presents with a chief complaint of bradycardia (15 Dec 2021 13:06)      ====================  HPI & Hospital Course:     This patient is an 87yo lady with PMH of acromegaly, htn, breast CA s/p L mastectomy who presents to the ED with complaint of fatigue and bradycardia. History was provided by son at bedside. The patient has had progressive decline in activity over the last few weeks. She has been sleeping more, eating less, and ambulating slower. She has intermittent confusion about dates, however she recognizes family. Son noticed her gait is much slower. She had no fever, chills, or vomiting or nausea. She has chronic urinary incontinence.   The patient was able to use an exercise bike for 15 minutes about 3-4 days ago without complaining of chest pain or dyspnea. She only has atraumatic back pain with exertion.  The patient was seen in the [facility] ED on 2021 for abdominal pain, and discharged home with augmentin for proctocolitis.      Pt had rapid response 12/15 AM for AMS, pt was reportedly AAOx2-3 then became increasingly lethargic, yet still responsive to commands. ABG showed pH 7.28, pCO2 79, UA + many bacteria, + nitrite, moderate LE. BCx were sent, zosyn x1 given, AVAPS started, pt's mental status appeared to improve with AVAPS. Also sent procal, prolactin, CBC, CMP. 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 Neurology, Cardiology, Pulmonary consulted.    Pt had 2nd RRT on 12/15 evening for bradycardia down to upper 30's, sinus hasmukh to 45-50, BPs stable and satting well on BIPAP. EKG showed sinus hasmukh, no AV block.              Past Medical History:     Past Surgical History:     Home Medications:  Crestor 20 mg oral tablet: 1 tab(s) orally once a day (15 Dec 2021 01:21)  levETIRAcetam 500 mg oral tablet: 1 tab(s) orally 2 times a day (15 Dec 2021 01:21)  memantine 10 mg oral tablet: 1 tab(s) orally once a day (15 Dec 2021 01:21)  Metoprolol Succinate  mg oral tablet, extended release: 1 tab(s) orally once a day (15 Dec 2021 01:21)      Current Medications:   MEDICATIONS  (STANDING):  amLODIPine   Tablet 5 milliGRAM(s) Oral daily  atorvastatin 80 milliGRAM(s) Oral at bedtime  enoxaparin Injectable 40 milliGRAM(s) SubCutaneous daily  furosemide   Injectable 40 milliGRAM(s) IV Push once  hydrALAZINE Injectable 10 milliGRAM(s) IV Push four times a day  levETIRAcetam 500 milliGRAM(s) Oral two times a day  memantine 10 milliGRAM(s) Oral daily  piperacillin/tazobactam IVPB.. 3.375 Gram(s) IV Intermittent every 8 hours    MEDICATIONS  (PRN):  acetaminophen     Tablet .. 650 milliGRAM(s) Oral every 6 hours PRN Temp greater or equal to 38C (100.4F), Mild Pain (1 - 3)      Allergies:     Family History:     Social History:     ====================  Vital Signs Last 24 Hrs  T(C): 36.6 (15 Dec 2021 14:55), Max: 37 (15 Dec 2021 10:28)  T(F): 97.9 (15 Dec 2021 14:55), Max: 98.6 (15 Dec 2021 10:28)  HR: 55 (15 Dec 2021 14:55) (43 - 70)  BP: 152/79 (15 Dec 2021 14:55) (150/80 - 189/76)  BP(mean): 103 (15 Dec 2021 14:55) (103 - 103)  RR: 19 (15 Dec 2021 10:28) (17 - 19)  SpO2: 98% (15 Dec 2021 14:55) (95% - 99%)    Adult Advanced Hemodynamics Last 24 Hrs  CVP(mm Hg): --  CVP(cm H2O): --  CO: --  CI: --  PA: --  PA(mean): --  PCWP: --  SVR: --  SVRI: --  PVR: --  PVRI: --    Physical Exam:   General: NAD  HEENT: PERRL, EOMI, normal sclera and conjunctiva, no oral lesions  Neck: Supple, no JVD  Lungs: CTA bilaterally  Heart: RRR, normal S1S2, no murmurs/rubs/gallops  Abdomen: Soft, ND/NT  Extremities: 2+ peripheral pulses, no cyanosis/clubbing/edema, full ROM  Skin: Warm, well-perfused  Neuro: A&O x3, no focal deficits      ====================  Labs & Imaging:   CBC Full  -  ( 15 Dec 2021 05:51 )  WBC Count : 7.19 K/uL  RBC Count : 4.48 M/uL  Hemoglobin : 10.7 g/dL  Hematocrit : 33.6 %  Platelet Count - Automated : 134 K/uL  Mean Cell Volume : 75.0 fl  Mean Cell Hemoglobin : 23.9 pg  Mean Cell Hemoglobin Concentration : 31.8 gm/dL  Auto Neutrophil # : x  Auto Lymphocyte # : x  Auto Monocyte # : x  Auto Eosinophil # : x  Auto Basophil # : x  Auto Neutrophil % : x  Auto Lymphocyte % : x  Auto Monocyte % : x  Auto Eosinophil % : x  Auto Basophil % : x    12-15    147<H>  |  104  |  14  ----------------------------<  117<H>  3.5   |  31  |  0.52    Ca    8.8      15 Dec 2021 05:51  Phos  4.1       Mg     2.3         TPro  6.7  /  Alb  4.0  /  TBili  0.4  /  DBili  x   /  AST  133<H>  /  ALT  84<H>  /  AlkPhos  114        ABG - ( 15 Dec 2021 13:33 )  pH, Arterial: 7.28  pH, Blood: x     /  pCO2: 78    /  pO2: 126   / HCO3: 37    / Base Excess: 8.1   /  SaO2: 99.8                    Urinalysis Basic - ( 15 Dec 2021 03:07 )    Color: Yellow / Appearance: Clear / S.026 / pH: x  Gluc: x / Ketone: Negative  / Bili: Negative / Urobili: 3 mg/dL   Blood: x / Protein: 100 mg/dL / Nitrite: Positive   Leuk Esterase: Moderate / RBC: 3 /hpf / WBC 68 /HPF   Sq Epi: x / Non Sq Epi: 0 /hpf / Bacteria: Many          ====================  Assessment & Plan:     ====================    Santhosh Lopes MD  Internal Medicine Resident  [phone] MICU Accept Note    TONIA MAJANO  86y  Patient is a 86y old  Female who presents with a chief complaint of bradycardia (15 Dec 2021 13:06)      ====================  HPI & Hospital Course:     This patient is an 87yo lady with PMH of acromegaly, htn, breast CA s/p L mastectomy who presents to the ED with complaint of fatigue and bradycardia. History was provided by son at bedside. The patient has had progressive decline in activity over the last few weeks. She has been sleeping more, eating less, and ambulating slower. She has intermittent confusion about dates, however she recognizes family. Son noticed her gait is much slower. She had no fever, chills, or vomiting or nausea. She has chronic urinary incontinence.   The patient was able to use an exercise bike for 15 minutes about 3-4 days ago without complaining of chest pain or dyspnea. She only has atraumatic back pain with exertion.  The patient was seen in the [facility] ED on 2021 for abdominal pain, and discharged home with augmentin for proctocolitis.      Pt had rapid response 12/15 AM for AMS, pt was reportedly AAOx2-3 then became increasingly lethargic, yet still responsive to commands. ABG showed pH 7.28, pCO2 79, UA + many bacteria, + nitrite, moderate LE. BCx were sent, zosyn x1 given, AVAPS started, pt's mental status appeared to improve with AVAPS. Also sent procal, prolactin, CBC, CMP. 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 Neurology, Cardiology, Pulmonary consulted.    Pt had 2nd RRT on 12/15 evening for bradycardia down to upper 30's, sinus hasmukh to 45-50, BPs stable and satting well on BIPAP. EKG showed sinus hasmukh, no AV block. Kept on BIPAP.              Past Medical History:     Past Surgical History:     Home Medications:  Crestor 20 mg oral tablet: 1 tab(s) orally once a day (15 Dec 2021 01:21)  levETIRAcetam 500 mg oral tablet: 1 tab(s) orally 2 times a day (15 Dec 2021 01:21)  memantine 10 mg oral tablet: 1 tab(s) orally once a day (15 Dec 2021 01:21)  Metoprolol Succinate  mg oral tablet, extended release: 1 tab(s) orally once a day (15 Dec 2021 01:21)      Current Medications:   MEDICATIONS  (STANDING):  amLODIPine   Tablet 5 milliGRAM(s) Oral daily  atorvastatin 80 milliGRAM(s) Oral at bedtime  enoxaparin Injectable 40 milliGRAM(s) SubCutaneous daily  furosemide   Injectable 40 milliGRAM(s) IV Push once  hydrALAZINE Injectable 10 milliGRAM(s) IV Push four times a day  levETIRAcetam 500 milliGRAM(s) Oral two times a day  memantine 10 milliGRAM(s) Oral daily  piperacillin/tazobactam IVPB.. 3.375 Gram(s) IV Intermittent every 8 hours    MEDICATIONS  (PRN):  acetaminophen     Tablet .. 650 milliGRAM(s) Oral every 6 hours PRN Temp greater or equal to 38C (100.4F), Mild Pain (1 - 3)      Allergies:     Family History:     Social History:     ====================  Vital Signs Last 24 Hrs  T(C): 36.6 (15 Dec 2021 14:55), Max: 37 (15 Dec 2021 10:28)  T(F): 97.9 (15 Dec 2021 14:55), Max: 98.6 (15 Dec 2021 10:28)  HR: 55 (15 Dec 2021 14:55) (43 - 70)  BP: 152/79 (15 Dec 2021 14:55) (150/80 - 189/76)  BP(mean): 103 (15 Dec 2021 14:55) (103 - 103)  RR: 19 (15 Dec 2021 10:28) (17 - 19)  SpO2: 98% (15 Dec 2021 14:55) (95% - 99%)    Adult Advanced Hemodynamics Last 24 Hrs  CVP(mm Hg): --  CVP(cm H2O): --  CO: --  CI: --  PA: --  PA(mean): --  PCWP: --  SVR: --  SVRI: --  PVR: --  PVRI: --    Physical Exam:   General: NAD  HEENT: PERRL, EOMI, normal sclera and conjunctiva, no oral lesions  Neck: Supple, no JVD  Lungs: CTA bilaterally  Heart: RRR, normal S1S2, no murmurs/rubs/gallops  Abdomen: Soft, ND/NT  Extremities: 2+ peripheral pulses, no cyanosis/clubbing/edema, full ROM  Skin: Warm, well-perfused  Neuro: A&O x3, no focal deficits      ====================  Labs & Imaging:   CBC Full  -  ( 15 Dec 2021 05:51 )  WBC Count : 7.19 K/uL  RBC Count : 4.48 M/uL  Hemoglobin : 10.7 g/dL  Hematocrit : 33.6 %  Platelet Count - Automated : 134 K/uL  Mean Cell Volume : 75.0 fl  Mean Cell Hemoglobin : 23.9 pg  Mean Cell Hemoglobin Concentration : 31.8 gm/dL  Auto Neutrophil # : x  Auto Lymphocyte # : x  Auto Monocyte # : x  Auto Eosinophil # : x  Auto Basophil # : x  Auto Neutrophil % : x  Auto Lymphocyte % : x  Auto Monocyte % : x  Auto Eosinophil % : x  Auto Basophil % : x    -15    147<H>  |  104  |  14  ----------------------------<  117<H>  3.5   |  31  |  0.52    Ca    8.8      15 Dec 2021 05:51  Phos  4.1     -  Mg     2.3     -    TPro  6.7  /  Alb  4.0  /  TBili  0.4  /  DBili  x   /  AST  133<H>  /  ALT  84<H>  /  AlkPhos  114  -      ABG - ( 15 Dec 2021 13:33 )  pH, Arterial: 7.28  pH, Blood: x     /  pCO2: 78    /  pO2: 126   / HCO3: 37    / Base Excess: 8.1   /  SaO2: 99.8                    Urinalysis Basic - ( 15 Dec 2021 03:07 )    Color: Yellow / Appearance: Clear / S.026 / pH: x  Gluc: x / Ketone: Negative  / Bili: Negative / Urobili: 3 mg/dL   Blood: x / Protein: 100 mg/dL / Nitrite: Positive   Leuk Esterase: Moderate / RBC: 3 /hpf / WBC 68 /HPF   Sq Epi: x / Non Sq Epi: 0 /hpf / Bacteria: Many          ====================  Assessment & Plan:     ====================    William Keen MD  Internal Medicine Resident  [phone] MICU Accept Note    TONIA MAJANO  86y  Patient is a 86y old  Female who presents with a chief complaint of bradycardia (15 Dec 2021 13:06)      ====================  HPI & Hospital Course:     This patient is an 85yo lady with PMH of acromegaly, htn, breast CA s/p L mastectomy who presents to the ED with complaint of fatigue and bradycardia. History was provided by son at bedside. The patient has had progressive decline in activity over the last few weeks. She has been sleeping more, eating less, and ambulating slower. She has intermittent confusion about dates, however she recognizes family. Son noticed her gait is much slower. She had no fever, chills, or vomiting or nausea. She has chronic urinary incontinence.   The patient was able to use an exercise bike for 15 minutes about 3-4 days ago without complaining of chest pain or dyspnea. She only has atraumatic back pain with exertion.  The patient was seen in the [facility] ED on 2021 for abdominal pain, and discharged home with augmentin for proctocolitis.      Pt had rapid response 12/15 AM for AMS, pt was reportedly AAOx2-3 then became increasingly lethargic, yet still responsive to commands. ABG showed pH 7.28, pCO2 79, UA + many bacteria, + nitrite, moderate LE. BCx were sent, zosyn x1 given, AVAPS started, pt's mental status appeared to improve with AVAPS. Also sent procal, prolactin, CBC, CMP. 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 Neurology, Cardiology, Pulmonary consulted.    Pt had 2nd RRT on 12/15 evening for bradycardia down to upper 30's, sinus hasmukh to 45-50, BPs stable and satting well on BIPAP. EKG showed sinus hasmukh, no AV block. Kept on BIPAP.              Past Medical History:     HTN (hypertension)  Dementia without behavioral disturbance, unspecified dementia type  Acromegaly  History of left mastectomy  H/O total hysterectomy  H/O small bowel obstruction    Allergies  No Known Allergies    FAMILY HISTORY:  Family history of diabetes mellitus (DM) (Mother, Sibling)    Social history: former smoker     Review of Systems: unable to obtain 2nd to mental status     Past Surgical History:     Home Medications:  Crestor 20 mg oral tablet: 1 tab(s) orally once a day (15 Dec 2021 01:21)  levETIRAcetam 500 mg oral tablet: 1 tab(s) orally 2 times a day (15 Dec 2021 01:)  memantine 10 mg oral tablet: 1 tab(s) orally once a day (15 Dec 2021 01:)  Metoprolol Succinate  mg oral tablet, extended release: 1 tab(s) orally once a day (15 Dec 2021 01:)      Current Medications:   MEDICATIONS  (STANDING):  amLODIPine   Tablet 5 milliGRAM(s) Oral daily  atorvastatin 80 milliGRAM(s) Oral at bedtime  enoxaparin Injectable 40 milliGRAM(s) SubCutaneous daily  furosemide   Injectable 40 milliGRAM(s) IV Push once  hydrALAZINE Injectable 10 milliGRAM(s) IV Push four times a day  levETIRAcetam 500 milliGRAM(s) Oral two times a day  memantine 10 milliGRAM(s) Oral daily  piperacillin/tazobactam IVPB.. 3.375 Gram(s) IV Intermittent every 8 hours    MEDICATIONS  (PRN):  acetaminophen     Tablet .. 650 milliGRAM(s) Oral every 6 hours PRN Temp greater or equal to 38C (100.4F), Mild Pain (1 - 3)        ====================  Vital Signs Last 24 Hrs  T(C): 36.6 (15 Dec 2021 14:55), Max: 37 (15 Dec 2021 10:28)  T(F): 97.9 (15 Dec 2021 14:55), Max: 98.6 (15 Dec 2021 10:28)  HR: 55 (15 Dec 2021 14:55) (43 - 70)  BP: 152/79 (15 Dec 2021 14:55) (150/80 - 189/76)  BP(mean): 103 (15 Dec 2021 14:55) (103 - 103)  RR: 19 (15 Dec 2021 10:28) (17 - 19)  SpO2: 98% (15 Dec 2021 14:55) (95% - 99%)    Adult Advanced Hemodynamics Last 24 Hrs  CVP(mm Hg): --  CVP(cm H2O): --  CO: --  CI: --  PA: --  PA(mean): --  PCWP: --  SVR: --  SVRI: --  PVR: --  PVRI: --    Physical Exam:   General: NAD  HEENT: PERRL, EOMI, normal sclera and conjunctiva, no oral lesions  Neck: Supple, no JVD  Lungs: CTA bilaterally  Heart: RRR, normal S1S2, no murmurs/rubs/gallops  Abdomen: Soft, ND/NT  Extremities: 2+ peripheral pulses, no cyanosis/clubbing/edema, full ROM  Skin: Warm, well-perfused  Neuro: A&O x3, no focal deficits      ====================  Labs & Imaging:   CBC Full  -  ( 15 Dec 2021 05:51 )  WBC Count : 7.19 K/uL  RBC Count : 4.48 M/uL  Hemoglobin : 10.7 g/dL  Hematocrit : 33.6 %  Platelet Count - Automated : 134 K/uL  Mean Cell Volume : 75.0 fl  Mean Cell Hemoglobin : 23.9 pg  Mean Cell Hemoglobin Concentration : 31.8 gm/dL  Auto Neutrophil # : x  Auto Lymphocyte # : x  Auto Monocyte # : x  Auto Eosinophil # : x  Auto Basophil # : x  Auto Neutrophil % : x  Auto Lymphocyte % : x  Auto Monocyte % : x  Auto Eosinophil % : x  Auto Basophil % : x    -15    147<H>  |  104  |  14  ----------------------------<  117<H>  3.5   |  31  |  0.52    Ca    8.8      15 Dec 2021 05:51  Phos  4.1     -  Mg     2.3     -    TPro  6.7  /  Alb  4.0  /  TBili  0.4  /  DBili  x   /  AST  133<H>  /  ALT  84<H>  /  AlkPhos  114  -14      ABG - ( 15 Dec 2021 13:33 )  pH, Arterial: 7.28  pH, Blood: x     /  pCO2: 78    /  pO2: 126   / HCO3: 37    / Base Excess: 8.1   /  SaO2: 99.8                    Urinalysis Basic - ( 15 Dec 2021 03:07 )    Color: Yellow / Appearance: Clear / S.026 / pH: x  Gluc: x / Ketone: Negative  / Bili: Negative / Urobili: 3 mg/dL   Blood: x / Protein: 100 mg/dL / Nitrite: Positive   Leuk Esterase: Moderate / RBC: 3 /hpf / WBC 68 /HPF   Sq Epi: x / Non Sq Epi: 0 /hpf / Bacteria: Many          ====================  Assessment & Plan:     87 y/o F with PMH of acromegaly, HTN, breast CA s/p L mastectomy. Presents to ED with c/o fatigue and bradycardia. Found to have +UA, started on abx. 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 MICU consulted for hypercapnia with decreased mentation. Repeat ABG with ongoing hypercapnia but stable pH      ====================    William Keen MD  Internal Medicine Resident  [phone] MICU Accept Note    TONIA MAJANO  86y  Patient is a 86y old  Female who presents with a chief complaint of bradycardia (15 Dec 2021 13:06)      ====================  HPI & Hospital Course:     This patient is an 87yo lady with PMH of acromegaly, htn, breast CA s/p L mastectomy who presents to the ED with complaint of fatigue and bradycardia. History was provided by son at bedside. The patient has had progressive decline in activity over the last few weeks. She has been sleeping more, eating less, and ambulating slower. She has intermittent confusion about dates, however she recognizes family. Son noticed her gait is much slower. She had no fever, chills, or vomiting or nausea. She has chronic urinary incontinence.   The patient was able to use an exercise bike for 15 minutes about 3-4 days ago without complaining of chest pain or dyspnea. She only has atraumatic back pain with exertion.  The patient was seen in the [facility] ED on 2021 for abdominal pain, and discharged home with augmentin for proctocolitis.      Pt had rapid response 12/15 AM for AMS, pt was reportedly AAOx2-3 then became increasingly lethargic, yet still responsive to commands. ABG showed pH 7.28, pCO2 79, UA + many bacteria, + nitrite, moderate LE. BCx were sent, zosyn x1 given, AVAPS started, pt's mental status appeared to improve with AVAPS. Also sent procal, prolactin, CBC, CMP. 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 Neurology, Cardiology, Pulmonary consulted.    Pt had 2nd RRT on 12/15 evening for bradycardia down to upper 30's, sinus hasmukh to 45-50, BPs stable and satting well on BIPAP. EKG showed sinus hasmukh, no AV block. Kept on BIPAP.              Past Medical History:     HTN (hypertension)  Dementia without behavioral disturbance, unspecified dementia type  Acromegaly  History of left mastectomy  H/O total hysterectomy  H/O small bowel obstruction    Allergies  No Known Allergies    FAMILY HISTORY:  Family history of diabetes mellitus (DM) (Mother, Sibling)    Social history: former smoker     Review of Systems: unable to obtain 2nd to mental status     Past Surgical History:     Home Medications:  Crestor 20 mg oral tablet: 1 tab(s) orally once a day (15 Dec 2021 01:21)  levETIRAcetam 500 mg oral tablet: 1 tab(s) orally 2 times a day (15 Dec 2021 01:)  memantine 10 mg oral tablet: 1 tab(s) orally once a day (15 Dec 2021 01:)  Metoprolol Succinate  mg oral tablet, extended release: 1 tab(s) orally once a day (15 Dec 2021 01:)      Current Medications:   MEDICATIONS  (STANDING):  amLODIPine   Tablet 5 milliGRAM(s) Oral daily  atorvastatin 80 milliGRAM(s) Oral at bedtime  enoxaparin Injectable 40 milliGRAM(s) SubCutaneous daily  furosemide   Injectable 40 milliGRAM(s) IV Push once  hydrALAZINE Injectable 10 milliGRAM(s) IV Push four times a day  levETIRAcetam 500 milliGRAM(s) Oral two times a day  memantine 10 milliGRAM(s) Oral daily  piperacillin/tazobactam IVPB.. 3.375 Gram(s) IV Intermittent every 8 hours    MEDICATIONS  (PRN):  acetaminophen     Tablet .. 650 milliGRAM(s) Oral every 6 hours PRN Temp greater or equal to 38C (100.4F), Mild Pain (1 - 3)        ====================  Vital Signs Last 24 Hrs  T(C): 36.6 (15 Dec 2021 14:55), Max: 37 (15 Dec 2021 10:28)  T(F): 97.9 (15 Dec 2021 14:55), Max: 98.6 (15 Dec 2021 10:28)  HR: 55 (15 Dec 2021 14:55) (43 - 70)  BP: 152/79 (15 Dec 2021 14:55) (150/80 - 189/76)  BP(mean): 103 (15 Dec 2021 14:55) (103 - 103)  RR: 19 (15 Dec 2021 10:28) (17 - 19)  SpO2: 98% (15 Dec 2021 14:55) (95% - 99%)    Adult Advanced Hemodynamics Last 24 Hrs  CVP(mm Hg): --  CVP(cm H2O): --  CO: --  CI: --  PA: --  PA(mean): --  PCWP: --  SVR: --  SVRI: --  PVR: --  PVRI: --    Physical Exam:   General: non-distressed  HEENT: pupils equal  Neurology: arousable to voice, follows commands, GCS is 11 [id number], MAGANA  Respiratory: clear bilaterally, mild diminishment R base, no wheezing. L breast implant.  CV: RRR, S1S2, no murmurs, rubs or gallops  Abdominal: Soft, NT, ND +BS  Extremities: No edema, +peripheral pulses      ====================  Labs & Imaging:   CBC Full  -  ( 15 Dec 2021 05:51 )  WBC Count : 7.19 K/uL  RBC Count : 4.48 M/uL  Hemoglobin : 10.7 g/dL  Hematocrit : 33.6 %  Platelet Count - Automated : 134 K/uL  Mean Cell Volume : 75.0 fl  Mean Cell Hemoglobin : 23.9 pg  Mean Cell Hemoglobin Concentration : 31.8 gm/dL  Auto Neutrophil # : x  Auto Lymphocyte # : x  Auto Monocyte # : x  Auto Eosinophil # : x  Auto Basophil # : x  Auto Neutrophil % : x  Auto Lymphocyte % : x  Auto Monocyte % : x  Auto Eosinophil % : x  Auto Basophil % : x    12-15    147<H>  |  104  |  14  ----------------------------<  117<H>  3.5   |  31  |  0.52    Ca    8.8      15 Dec 2021 05:51  Phos  4.1     -  Mg     2.3     -    TPro  6.7  /  Alb  4.0  /  TBili  0.4  /  DBili  x   /  AST  133<H>  /  ALT  84<H>  /  AlkPhos  114  12-14      ABG - ( 15 Dec 2021 13:33 )  pH, Arterial: 7.28  pH, Blood: x     /  pCO2: 78    /  pO2: 126   / HCO3: 37    / Base Excess: 8.1   /  SaO2: 99.8                    Urinalysis Basic - ( 15 Dec 2021 03:07 )    Color: Yellow / Appearance: Clear / S.026 / pH: x  Gluc: x / Ketone: Negative  / Bili: Negative / Urobili: 3 mg/dL   Blood: x / Protein: 100 mg/dL / Nitrite: Positive   Leuk Esterase: Moderate / RBC: 3 /hpf / WBC 68 /HPF   Sq Epi: x / Non Sq Epi: 0 /hpf / Bacteria: Many          ====================  Assessment & Plan:     87 y/o F with PMH of acromegaly, HTN, breast CA s/p L mastectomy. Presents to ED with c/o fatigue and bradycardia. Found to have +UA, started on abx. 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 MICU consulted for hypercapnia with decreased mentation. 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 CTH  had chronic b/l basal ganglia details  -c/w keppra 500 mg q12  -neuro checks q4  -neurology consulted, appreciate recs  -will plan for eventual MRI brain no con, rEEG,  -holding home memantine due to side effect of bradycardia  -will send folate, b12, TSH, a1c, MMA, iron panel        #Cardiovascular  -pt was hasmukh to upper 30's during RRT on 12/15 evening, later came up 45-50. EKG showed sinus hasmukh, no AV block.  -cardiology consulted, per cards, not true bradycardia, more due to perfusive PVCs.  -TTE ordered but pt apparently refused, heart not well visualized.  -pBNP 3515  -lasix 20 mg IV ordered    #Pulmonary  -ABG during RRT 12/15 7.28, pCO2 78, pO2 126, HCO3 37; VBG later showed pH 7.17, pCO2 85, pO2 38, HCO3 31  -on BIPAP right now  -pulmonology following, appreciate recs  -per pulm would like CT chest once stable    #Renal  No active issues      #Infectious disease  -many bacteria on UA, + LE, moderate nitrite  -f/u BCx x 2, UCx,   -started on zosyn (12/15 - )    #Hematology/Oncology  -platelets 166 -> 134, thrombocytopenic, will CTM  -maintain active T&S    #Gastrointestinal  -NPO for now.    #Endocrine      #Psychiatric      #PPx  DVT: lovenox      ====================    William Keen MD  Internal Medicine Resident  [phone] MICU Accept Note    TONIA MAJANO  86y  Patient is a 86y old  Female who presents with a chief complaint of bradycardia (15 Dec 2021 13:06)      ====================  HPI & Hospital Course:     This patient is an 87yo lady with PMH of acromegaly, htn, breast CA s/p L mastectomy who presents to the ED with complaint of fatigue and bradycardia. History was provided by son at bedside. The patient has had progressive decline in activity over the last few weeks. She has been sleeping more, eating less, and ambulating slower. She has intermittent confusion about dates, however she recognizes family. Son noticed her gait is much slower. She had no fever, chills, or vomiting or nausea. She has chronic urinary incontinence.   The patient was able to use an exercise bike for 15 minutes about 3-4 days ago without complaining of chest pain or dyspnea. She only has atraumatic back pain with exertion.  The patient was seen in the [facility] ED on 2021 for abdominal pain, and discharged home with augmentin for proctocolitis.      Pt had rapid response 12/15 AM for AMS, pt was reportedly AAOx2-3 then became increasingly lethargic, yet still responsive to commands. ABG showed pH 7.28, pCO2 79, UA + many bacteria, + nitrite, moderate LE. BCx were sent, zosyn x1 given, AVAPS started, pt's mental status appeared to improve with AVAPS. Also sent procal, prolactin, CBC, CMP. 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 Neurology, Cardiology, Pulmonary consulted.    Pt had 2nd RRT on 12/15 evening for bradycardia down to upper 30's, sinus hasmukh to 45-50, BPs stable and satting well on BIPAP. EKG showed sinus hasmukh, no AV block. Kept on BIPAP.              Past Medical History:     HTN (hypertension)  Dementia without behavioral disturbance, unspecified dementia type  Acromegaly  History of left mastectomy  H/O total hysterectomy  H/O small bowel obstruction    Allergies  No Known Allergies    FAMILY HISTORY:  Family history of diabetes mellitus (DM) (Mother, Sibling)    Social history: former smoker     Review of Systems: unable to obtain 2nd to mental status     Past Surgical History:     Home Medications:  Crestor 20 mg oral tablet: 1 tab(s) orally once a day (15 Dec 2021 01:21)  levETIRAcetam 500 mg oral tablet: 1 tab(s) orally 2 times a day (15 Dec 2021 01:)  memantine 10 mg oral tablet: 1 tab(s) orally once a day (15 Dec 2021 01:)  Metoprolol Succinate  mg oral tablet, extended release: 1 tab(s) orally once a day (15 Dec 2021 01:)      Current Medications:   MEDICATIONS  (STANDING):  amLODIPine   Tablet 5 milliGRAM(s) Oral daily  atorvastatin 80 milliGRAM(s) Oral at bedtime  enoxaparin Injectable 40 milliGRAM(s) SubCutaneous daily  furosemide   Injectable 40 milliGRAM(s) IV Push once  hydrALAZINE Injectable 10 milliGRAM(s) IV Push four times a day  levETIRAcetam 500 milliGRAM(s) Oral two times a day  memantine 10 milliGRAM(s) Oral daily  piperacillin/tazobactam IVPB.. 3.375 Gram(s) IV Intermittent every 8 hours    MEDICATIONS  (PRN):  acetaminophen     Tablet .. 650 milliGRAM(s) Oral every 6 hours PRN Temp greater or equal to 38C (100.4F), Mild Pain (1 - 3)        ====================  Vital Signs Last 24 Hrs  T(C): 36.6 (15 Dec 2021 14:55), Max: 37 (15 Dec 2021 10:28)  T(F): 97.9 (15 Dec 2021 14:55), Max: 98.6 (15 Dec 2021 10:28)  HR: 55 (15 Dec 2021 14:55) (43 - 70)  BP: 152/79 (15 Dec 2021 14:55) (150/80 - 189/76)  BP(mean): 103 (15 Dec 2021 14:55) (103 - 103)  RR: 19 (15 Dec 2021 10:28) (17 - 19)  SpO2: 98% (15 Dec 2021 14:55) (95% - 99%)    Adult Advanced Hemodynamics Last 24 Hrs  CVP(mm Hg): --  CVP(cm H2O): --  CO: --  CI: --  PA: --  PA(mean): --  PCWP: --  SVR: --  SVRI: --  PVR: --  PVRI: --    Physical Exam:   General: non-distressed  HEENT: pupils equal  Neurology: arousable to voice, follows commands, GCS is 11 E3V2M6, MAGANA  Respiratory: clear bilaterally, mild diminishment R base, no wheezing. L breast implant.  CV: RRR, S1S2, no murmurs, rubs or gallops  Abdominal: Soft, NT, ND +BS  Extremities: No edema, +peripheral pulses      ====================  Labs & Imaging:   CBC Full  -  ( 15 Dec 2021 05:51 )  WBC Count : 7.19 K/uL  RBC Count : 4.48 M/uL  Hemoglobin : 10.7 g/dL  Hematocrit : 33.6 %  Platelet Count - Automated : 134 K/uL  Mean Cell Volume : 75.0 fl  Mean Cell Hemoglobin : 23.9 pg  Mean Cell Hemoglobin Concentration : 31.8 gm/dL  Auto Neutrophil # : x  Auto Lymphocyte # : x  Auto Monocyte # : x  Auto Eosinophil # : x  Auto Basophil # : x  Auto Neutrophil % : x  Auto Lymphocyte % : x  Auto Monocyte % : x  Auto Eosinophil % : x  Auto Basophil % : x    12-15    147<H>  |  104  |  14  ----------------------------<  117<H>  3.5   |  31  |  0.52    Ca    8.8      15 Dec 2021 05:51  Phos  4.1     -  Mg     2.3     -    TPro  6.7  /  Alb  4.0  /  TBili  0.4  /  DBili  x   /  AST  133<H>  /  ALT  84<H>  /  AlkPhos  114  12-14      ABG - ( 15 Dec 2021 13:33 )  pH, Arterial: 7.28  pH, Blood: x     /  pCO2: 78    /  pO2: 126   / HCO3: 37    / Base Excess: 8.1   /  SaO2: 99.8                    Urinalysis Basic - ( 15 Dec 2021 03:07 )    Color: Yellow / Appearance: Clear / S.026 / pH: x  Gluc: x / Ketone: Negative  / Bili: Negative / Urobili: 3 mg/dL   Blood: x / Protein: 100 mg/dL / Nitrite: Positive   Leuk Esterase: Moderate / RBC: 3 /hpf / WBC 68 /HPF   Sq Epi: x / Non Sq Epi: 0 /hpf / Bacteria: Many          ====================  Assessment & Plan:     87 y/o F with PMH of acromegaly, HTN, breast CA s/p L mastectomy. Presents to ED with c/o fatigue and bradycardia. Found to have +UA, started on abx. 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 MICU consulted for hypercapnia with decreased mentation. 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 CTH  had chronic b/l basal ganglia details  -c/w keppra 500 mg q12  -neuro checks q4  -neurology consulted, appreciate recs  -will plan for eventual MRI brain no con, rEEG,  -holding home memantine due to side effect of bradycardia  -will send folate, b12, TSH, a1c, MMA, iron panel        #Cardiovascular  -pt was hasmukh to upper 30's during RRT on 12/15 evening, later came up 45-50. EKG showed sinus hasmukh, no AV block.  -cardiology consulted, per cards, not true bradycardia, more due to perfusive PVCs.  -TTE ordered but pt apparently refused, heart not well visualized.  -pBNP 3515  -lasix 20 mg IV ordered  -holding home toprol  mg daily for now    #Pulmonary  -ABG during RRT 12/15 7.28, pCO2 78, pO2 126, HCO3 37; VBG later showed pH 7.17, pCO2 85, pO2 38, HCO3 31  -on BIPAP right now  -pulmonology following, appreciate recs  -per pulm would like CT chest once stable    #Renal  No active issues  -strict I/Os      #Infectious disease  -many bacteria on UA, + LE, moderate nitrite  -f/u BCx x 2, UCx,   -started on zosyn (12/15 - )    #Hematology/Oncology  -platelets 166 -> 134, thrombocytopenic, will CTM  -maintain active T&S    #Gastrointestinal  -NPO for now.    #Endocrine  -FSGs wnl so far, 130 -> 100     #Psychiatric  No active issues    #PPx  DVT: lovenox      ====================    William Keen MD  Internal Medicine Resident  [phone] MICU Accept Note    TONIA MAJANO  86y  Patient is a 86y old  Female who presents with a chief complaint of bradycardia (15 Dec 2021 13:06)      ====================  HPI & Hospital Course:     This patient is an 85yo lady with PMH of acromegaly, htn, breast CA s/p L mastectomy who presents to the ED with complaint of fatigue and bradycardia. History was provided by son at bedside. The patient has had progressive decline in activity over the last few weeks. She has been sleeping more, eating less, and ambulating slower. She has intermittent confusion about dates, however she recognizes family. Son noticed her gait is much slower. She had no fever, chills, or vomiting or nausea. She has chronic urinary incontinence.   The patient was able to use an exercise bike for 15 minutes about 3-4 days ago without complaining of chest pain or dyspnea. She only has atraumatic back pain with exertion.  The patient was seen in the [facility] ED on 2021 for abdominal pain, and discharged home with augmentin for proctocolitis.      Pt had rapid response 12/15 AM for AMS, pt was reportedly AAOx2-3 then became increasingly lethargic, yet still responsive to commands. ABG showed pH 7.28, pCO2 79, UA + many bacteria, + nitrite, moderate LE. BCx were sent, zosyn x1 given, AVAPS started, pt's mental status appeared to improve with AVAPS. Also sent procal, prolactin, CBC, CMP. 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 Neurology, Cardiology, Pulmonary consulted.    Pt had 2nd RRT on 12/15 evening for bradycardia down to upper 30's, sinus hasmukh to 45-50, BPs stable and satting well on BIPAP. EKG showed sinus hasmukh, no AV block. Kept on BIPAP.              Past Medical History:     HTN (hypertension)  Dementia without behavioral disturbance, unspecified dementia type  Acromegaly  History of left mastectomy  H/O total hysterectomy  H/O small bowel obstruction    Allergies  No Known Allergies    FAMILY HISTORY:  Family history of diabetes mellitus (DM) (Mother, Sibling)    Social history: former smoker     Review of Systems: unable to obtain 2nd to mental status     Past Surgical History:     Home Medications:  Crestor 20 mg oral tablet: 1 tab(s) orally once a day (15 Dec 2021 01:21)  levETIRAcetam 500 mg oral tablet: 1 tab(s) orally 2 times a day (15 Dec 2021 01:)  memantine 10 mg oral tablet: 1 tab(s) orally once a day (15 Dec 2021 01:)  Metoprolol Succinate  mg oral tablet, extended release: 1 tab(s) orally once a day (15 Dec 2021 01:)      Current Medications:   MEDICATIONS  (STANDING):  amLODIPine   Tablet 5 milliGRAM(s) Oral daily  atorvastatin 80 milliGRAM(s) Oral at bedtime  enoxaparin Injectable 40 milliGRAM(s) SubCutaneous daily  furosemide   Injectable 40 milliGRAM(s) IV Push once  hydrALAZINE Injectable 10 milliGRAM(s) IV Push four times a day  levETIRAcetam 500 milliGRAM(s) Oral two times a day  memantine 10 milliGRAM(s) Oral daily  piperacillin/tazobactam IVPB.. 3.375 Gram(s) IV Intermittent every 8 hours    MEDICATIONS  (PRN):  acetaminophen     Tablet .. 650 milliGRAM(s) Oral every 6 hours PRN Temp greater or equal to 38C (100.4F), Mild Pain (1 - 3)        ====================  Vital Signs Last 24 Hrs  T(C): 36.6 (15 Dec 2021 14:55), Max: 37 (15 Dec 2021 10:28)  T(F): 97.9 (15 Dec 2021 14:55), Max: 98.6 (15 Dec 2021 10:28)  HR: 55 (15 Dec 2021 14:55) (43 - 70)  BP: 152/79 (15 Dec 2021 14:55) (150/80 - 189/76)  BP(mean): 103 (15 Dec 2021 14:55) (103 - 103)  RR: 19 (15 Dec 2021 10:28) (17 - 19)  SpO2: 98% (15 Dec 2021 14:55) (95% - 99%)    Adult Advanced Hemodynamics Last 24 Hrs  CVP(mm Hg): --  CVP(cm H2O): --  CO: --  CI: --  PA: --  PA(mean): --  PCWP: --  SVR: --  SVRI: --  PVR: --  PVRI: --    Physical Exam:   General: non-distressed  HEENT: pupils equal  Neurology: arousable to voice, follows commands, GCS is 11 E3V2M6, MAGANA  Respiratory: clear bilaterally, mild diminishment R base, no wheezing. L breast implant.  CV: RRR, S1S2, no murmurs, rubs or gallops  Abdominal: Soft, NT, ND +BS  Extremities: No edema, +peripheral pulses      ====================  Labs & Imaging:   CBC Full  -  ( 15 Dec 2021 05:51 )  WBC Count : 7.19 K/uL  RBC Count : 4.48 M/uL  Hemoglobin : 10.7 g/dL  Hematocrit : 33.6 %  Platelet Count - Automated : 134 K/uL  Mean Cell Volume : 75.0 fl  Mean Cell Hemoglobin : 23.9 pg  Mean Cell Hemoglobin Concentration : 31.8 gm/dL  Auto Neutrophil # : x  Auto Lymphocyte # : x  Auto Monocyte # : x  Auto Eosinophil # : x  Auto Basophil # : x  Auto Neutrophil % : x  Auto Lymphocyte % : x  Auto Monocyte % : x  Auto Eosinophil % : x  Auto Basophil % : x    12-15    147<H>  |  104  |  14  ----------------------------<  117<H>  3.5   |  31  |  0.52    Ca    8.8      15 Dec 2021 05:51  Phos  4.1     -  Mg     2.3     -    TPro  6.7  /  Alb  4.0  /  TBili  0.4  /  DBili  x   /  AST  133<H>  /  ALT  84<H>  /  AlkPhos  114  12-14      ABG - ( 15 Dec 2021 13:33 )  pH, Arterial: 7.28  pH, Blood: x     /  pCO2: 78    /  pO2: 126   / HCO3: 37    / Base Excess: 8.1   /  SaO2: 99.8                    Urinalysis Basic - ( 15 Dec 2021 03:07 )    Color: Yellow / Appearance: Clear / S.026 / pH: x  Gluc: x / Ketone: Negative  / Bili: Negative / Urobili: 3 mg/dL   Blood: x / Protein: 100 mg/dL / Nitrite: Positive   Leuk Esterase: Moderate / RBC: 3 /hpf / WBC 68 /HPF   Sq Epi: x / Non Sq Epi: 0 /hpf / Bacteria: Many          ====================  Assessment & Plan:     87 y/o F with PMH of acromegaly, HTN, breast CA s/p L mastectomy. Presents to ED with c/o fatigue and bradycardia. Found to have +UA, started on abx. 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 MICU consulted for hypercapnia with decreased mentation. Repeat ABG with ongoing hypercapnia but stable pH. Admitted to MICU for likely metabolic encephalopathy 2/2 sepsis 2/2 UTI, also likely has underlying chronic hypercapnia        Plan      #Neuro  RRT for AMS 12/15, pt was initially AAOx2-3, became increasingly lethargic  -currently lethargic but arousable  -history of ICH 6 months ago s/p nsgy, on keppra for seizure ppx ever since  -GCS score 15. CTH  had chronic b/l basal ganglia details  -likely metabolic encephalopathy 2/2 sepsis 2/2 UTI, also likely has some underlying chronic hypercapnia  -c/w keppra 500 mg q12  -neuro checks q4  -neurology consulted, appreciate recs  -will plan for eventual MRI brain no con, rEEG  -holding home memantine due to side effect of bradycardia  -will send folate, b12, TSH, a1c, MMA, iron panel        #Cardiovascular  -pt was hasmukh to upper 30's during RRT on 12/15 evening, later came up 45-50. EKG showed sinus hasmukh, no AV block.  -cardiology consulted, per cards, not true bradycardia, more due to perfusive PVCs.  -TTE ordered but pt apparently refused, heart not well visualized.  -pBNP 3515  -lasix 20 mg IV ordered  -holding home toprol  mg daily for now    #Pulmonary  -ABG during RRT 12/15 7.28, pCO2 78, pO2 126, HCO3 37; VBG later showed pH 7.17, pCO2 85, pO2 38, HCO3 31  -likely has underlying chronic hypercapnia  -on BIPAP right now  -pulmonology following, appreciate recs  -per pulm would like CT chest once stable    #Renal  No active issues  -strict I/Os      #Infectious disease  -many bacteria on UA, + LE, moderate nitrite  -likely has sepsis 2/2 UTI, contributing to current lethargy  -f/u BCx x 2, UCx,   -started on zosyn (12/15 - )    #Hematology/Oncology  -platelets 166 -> 134, thrombocytopenic, will CTM  -maintain active T&S    #Gastrointestinal  -NPO for now.    #Endocrine  -FSGs wnl so far, 130 -> 100     #Psychiatric  No active issues    #PPx  DVT: lovenox      ====================    William Keen MD  Internal Medicine Resident  [phone] MICU Accept Note    TONIA MAJANO  86y  Patient is a 86y old  Female who presents with a chief complaint of bradycardia (15 Dec 2021 13:06)      ====================  HPI & Hospital Course:     This patient is an 85yo lady with PMH of acromegaly, htn, breast CA s/p L mastectomy who presents to the ED with complaint of fatigue and bradycardia. History was provided by son at bedside. The patient has had progressive decline in activity over the last few weeks. She has been sleeping more, eating less, and ambulating slower. She has intermittent confusion about dates, however she recognizes family. Son noticed her gait is much slower. She had no fever, chills, or vomiting or nausea. She has chronic urinary incontinence.   The patient was able to use an exercise bike for 15 minutes about 3-4 days ago without complaining of chest pain or dyspnea. She only has atraumatic back pain with exertion.  The patient was seen in the [facility] ED on 2021 for abdominal pain, and discharged home with augmentin for proctocolitis.      Pt had rapid response 12/15 AM for AMS, pt was reportedly AAOx2-3 then became increasingly lethargic, yet still responsive to commands. ABG showed pH 7.28, pCO2 79, UA + many bacteria, + nitrite, moderate LE. BCx were sent, zosyn x1 given, AVAPS started, pt's mental status appeared to improve with AVAPS. Also sent procal, prolactin, CBC, CMP. 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 Neurology, Cardiology, Pulmonary consulted.    Pt had 2nd RRT on 12/15 evening for bradycardia down to upper 30's, sinus hasmukh to 45-50, BPs stable and satting well on BIPAP. EKG showed sinus hasmukh, no AV block. Kept on BIPAP.              Past Medical History:     HTN (hypertension)  Dementia without behavioral disturbance, unspecified dementia type  Acromegaly  History of left mastectomy  H/O total hysterectomy  H/O small bowel obstruction    Allergies  No Known Allergies    FAMILY HISTORY:  Family history of diabetes mellitus (DM) (Mother, Sibling)    Social history: former smoker     Review of Systems: unable to obtain 2nd to mental status     Past Surgical History:     Home Medications:  Crestor 20 mg oral tablet: 1 tab(s) orally once a day (15 Dec 2021 01:21)  levETIRAcetam 500 mg oral tablet: 1 tab(s) orally 2 times a day (15 Dec 2021 01:)  memantine 10 mg oral tablet: 1 tab(s) orally once a day (15 Dec 2021 01:)  Metoprolol Succinate  mg oral tablet, extended release: 1 tab(s) orally once a day (15 Dec 2021 01:)      Current Medications:   MEDICATIONS  (STANDING):  amLODIPine   Tablet 5 milliGRAM(s) Oral daily  atorvastatin 80 milliGRAM(s) Oral at bedtime  enoxaparin Injectable 40 milliGRAM(s) SubCutaneous daily  furosemide   Injectable 40 milliGRAM(s) IV Push once  hydrALAZINE Injectable 10 milliGRAM(s) IV Push four times a day  levETIRAcetam 500 milliGRAM(s) Oral two times a day  memantine 10 milliGRAM(s) Oral daily  piperacillin/tazobactam IVPB.. 3.375 Gram(s) IV Intermittent every 8 hours    MEDICATIONS  (PRN):  acetaminophen     Tablet .. 650 milliGRAM(s) Oral every 6 hours PRN Temp greater or equal to 38C (100.4F), Mild Pain (1 - 3)        ====================  Vital Signs Last 24 Hrs  T(C): 36.6 (15 Dec 2021 14:55), Max: 37 (15 Dec 2021 10:28)  T(F): 97.9 (15 Dec 2021 14:55), Max: 98.6 (15 Dec 2021 10:28)  HR: 55 (15 Dec 2021 14:55) (43 - 70)  BP: 152/79 (15 Dec 2021 14:55) (150/80 - 189/76)  BP(mean): 103 (15 Dec 2021 14:55) (103 - 103)  RR: 19 (15 Dec 2021 10:28) (17 - 19)  SpO2: 98% (15 Dec 2021 14:55) (95% - 99%)    Adult Advanced Hemodynamics Last 24 Hrs  CVP(mm Hg): --  CVP(cm H2O): --  CO: --  CI: --  PA: --  PA(mean): --  PCWP: --  SVR: --  SVRI: --  PVR: --  PVRI: --    Physical Exam:   General: non-distressed  HEENT: pupils equal  Neurology: arousable to voice, follows commands, GCS is 11 E3V2M6, MAGANA  Respiratory: clear bilaterally, mild diminishment R base, no wheezing. L breast implant.  CV: RRR, S1S2, no murmurs, rubs or gallops  Abdominal: Soft, NT, ND +BS  Extremities: No edema, +peripheral pulses      ====================  Labs & Imaging:   CBC Full  -  ( 15 Dec 2021 05:51 )  WBC Count : 7.19 K/uL  RBC Count : 4.48 M/uL  Hemoglobin : 10.7 g/dL  Hematocrit : 33.6 %  Platelet Count - Automated : 134 K/uL  Mean Cell Volume : 75.0 fl  Mean Cell Hemoglobin : 23.9 pg  Mean Cell Hemoglobin Concentration : 31.8 gm/dL  Auto Neutrophil # : x  Auto Lymphocyte # : x  Auto Monocyte # : x  Auto Eosinophil # : x  Auto Basophil # : x  Auto Neutrophil % : x  Auto Lymphocyte % : x  Auto Monocyte % : x  Auto Eosinophil % : x  Auto Basophil % : x    12-15    147<H>  |  104  |  14  ----------------------------<  117<H>  3.5   |  31  |  0.52    Ca    8.8      15 Dec 2021 05:51  Phos  4.1     -  Mg     2.3     -    TPro  6.7  /  Alb  4.0  /  TBili  0.4  /  DBili  x   /  AST  133<H>  /  ALT  84<H>  /  AlkPhos  114  12-14      ABG - ( 15 Dec 2021 13:33 )  pH, Arterial: 7.28  pH, Blood: x     /  pCO2: 78    /  pO2: 126   / HCO3: 37    / Base Excess: 8.1   /  SaO2: 99.8                    Urinalysis Basic - ( 15 Dec 2021 03:07 )    Color: Yellow / Appearance: Clear / S.026 / pH: x  Gluc: x / Ketone: Negative  / Bili: Negative / Urobili: 3 mg/dL   Blood: x / Protein: 100 mg/dL / Nitrite: Positive   Leuk Esterase: Moderate / RBC: 3 /hpf / WBC 68 /HPF   Sq Epi: x / Non Sq Epi: 0 /hpf / Bacteria: Many          ====================  Assessment & Plan:     87 y/o F with PMH of acromegaly, HTN, breast CA s/p L mastectomy. Presents to ED with c/o fatigue and bradycardia. Found to have +UA, started on abx. 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 MICU consulted for hypercapnia with decreased mentation. Repeat ABG with ongoing hypercapnia but stable pH. Admitted to MICU for likely metabolic encephalopathy 2/2 sepsis 2/2 UTI, also likely has underlying chronic hypercapnia        Plan      #Neuro  RRT for AMS 12/15, pt was initially AAOx2-3, became increasingly lethargic  -currently lethargic but arousable  -history of ICH 6 months ago s/p nsgy, on keppra for seizure ppx ever since  -GCS score 15. CTH  had chronic b/l basal ganglia details  -likely metabolic encephalopathy 2/2 sepsis 2/2 UTI, also likely has some underlying chronic hypercapnia  -c/w keppra 500 mg q12  -neuro checks q4  -neurology consulted, appreciate recs  -will plan for eventual MRI brain no con, rEEG  -holding home memantine due to side effect of bradycardia  -will send folate, b12, TSH, a1c, MMA, iron panel        #Cardiovascular  -pt was hasmukh to upper 30's during RRT on 12/15 evening, later came up 45-50. EKG showed sinus hasmukh, no AV block.  -cardiology consulted, per cards, not true bradycardia, more due to perfusive PVCs.  -TTE ordered but pt apparently refused, heart not well visualized.  -pBNP 3515  -lasix 20 mg IV ordered  -holding home toprol  mg daily for now    #Pulmonary  -ABG during RRT 12/15 7.28, pCO2 78, pO2 126, HCO3 37; VBG later showed pH 7.17, pCO2 85, pO2 38, HCO3 31  -likely has underlying chronic hypercapnia  -on BIPAP right now  -pulmonology following, appreciate recs  -per pulm would like CT chest once stable    #Renal  No active issues  -strict I/Os      #Infectious disease  -many bacteria on UA, + LE, moderate nitrite  -likely has sepsis 2/2 UTI, contributing to current lethargy  -f/u BCx x 2, UCx,   -started on zosyn (12/15 - )    #Hematology/Oncology  -platelets 166 -> 134, thrombocytopenic, will CTM  -maintain active T&S    #Gastrointestinal  -NPO for now.    #Endocrine  -FSGs wnl so far, 130 -> 100     #Psychiatric  No active issues    #PPx  DVT: lovenox    #Ethics  Full code      ====================    William Keen MD  Internal Medicine Resident  [phone] MICU Accept Note    TONIA MAJANO  86y  Patient is a 86y old  Female who presents with a chief complaint of bradycardia (15 Dec 2021 13:06)      ====================  HPI & Hospital Course:     This patient is an 85yo lady with PMH of acromegaly, htn, breast CA s/p L mastectomy who presents to the ED with complaint of fatigue and bradycardia. History was provided by son at bedside. The patient has had progressive decline in activity over the last few weeks. She has been sleeping more, eating less, and ambulating slower. She has intermittent confusion about dates, however she recognizes family. Son noticed her gait is much slower. She had no fever, chills, or vomiting or nausea. She has chronic urinary incontinence.   The patient was able to use an exercise bike for 15 minutes about 3-4 days ago without complaining of chest pain or dyspnea. She only has atraumatic back pain with exertion.  The patient was seen in the [facility] ED on 2021 for abdominal pain, and discharged home with augmentin for proctocolitis.      Pt had rapid response 12/15 AM for AMS, pt was reportedly AAOx2-3 then became increasingly lethargic, yet still responsive to commands. ABG showed pH 7.28, pCO2 79, UA + many bacteria, + nitrite, moderate LE. BCx were sent, zosyn x1 given, AVAPS started, pt's mental status appeared to improve with AVAPS. Also sent procal, prolactin, CBC, CMP. 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 Neurology, Cardiology, Pulmonary consulted.    Pt had 2nd RRT on 12/15 evening for bradycardia down to upper 30's, sinus hasmukh to 45-50, BPs stable and satting well on BIPAP. EKG showed sinus hasmukh, no AV block. Kept on BIPAP.              Past Medical History:     HTN (hypertension)  Dementia without behavioral disturbance, unspecified dementia type  Acromegaly  History of left mastectomy  H/O total hysterectomy  H/O small bowel obstruction    Allergies  No Known Allergies    FAMILY HISTORY:  Family history of diabetes mellitus (DM) (Mother, Sibling)    Social history: former smoker     Review of Systems: unable to obtain 2nd to mental status     Past Surgical History:     Home Medications:  Crestor 20 mg oral tablet: 1 tab(s) orally once a day (15 Dec 2021 01:21)  levETIRAcetam 500 mg oral tablet: 1 tab(s) orally 2 times a day (15 Dec 2021 01:)  memantine 10 mg oral tablet: 1 tab(s) orally once a day (15 Dec 2021 01:)  Metoprolol Succinate  mg oral tablet, extended release: 1 tab(s) orally once a day (15 Dec 2021 01:)      Current Medications:   MEDICATIONS  (STANDING):  amLODIPine   Tablet 5 milliGRAM(s) Oral daily  atorvastatin 80 milliGRAM(s) Oral at bedtime  enoxaparin Injectable 40 milliGRAM(s) SubCutaneous daily  furosemide   Injectable 40 milliGRAM(s) IV Push once  hydrALAZINE Injectable 10 milliGRAM(s) IV Push four times a day  levETIRAcetam 500 milliGRAM(s) Oral two times a day  memantine 10 milliGRAM(s) Oral daily  piperacillin/tazobactam IVPB.. 3.375 Gram(s) IV Intermittent every 8 hours    MEDICATIONS  (PRN):  acetaminophen     Tablet .. 650 milliGRAM(s) Oral every 6 hours PRN Temp greater or equal to 38C (100.4F), Mild Pain (1 - 3)        ====================  Vital Signs Last 24 Hrs  T(C): 36.6 (15 Dec 2021 14:55), Max: 37 (15 Dec 2021 10:28)  T(F): 97.9 (15 Dec 2021 14:55), Max: 98.6 (15 Dec 2021 10:28)  HR: 55 (15 Dec 2021 14:55) (43 - 70)  BP: 152/79 (15 Dec 2021 14:55) (150/80 - 189/76)  BP(mean): 103 (15 Dec 2021 14:55) (103 - 103)  RR: 19 (15 Dec 2021 10:28) (17 - 19)  SpO2: 98% (15 Dec 2021 14:55) (95% - 99%)    Adult Advanced Hemodynamics Last 24 Hrs  CVP(mm Hg): --  CVP(cm H2O): --  CO: --  CI: --  PA: --  PA(mean): --  PCWP: --  SVR: --  SVRI: --  PVR: --  PVRI: --    Physical Exam:   General: non-distressed  HEENT: pupils equal  Neurology: arousable to voice, follows commands, GCS is 11 E3V2M6, MAGANA  Respiratory: clear bilaterally, mild diminishment R base, no wheezing. L breast implant.  CV: RRR, S1S2, no murmurs, rubs or gallops  Abdominal: Soft, NT, ND +BS  Extremities: No edema, +peripheral pulses      ====================  Labs & Imaging:   CBC Full  -  ( 15 Dec 2021 05:51 )  WBC Count : 7.19 K/uL  RBC Count : 4.48 M/uL  Hemoglobin : 10.7 g/dL  Hematocrit : 33.6 %  Platelet Count - Automated : 134 K/uL  Mean Cell Volume : 75.0 fl  Mean Cell Hemoglobin : 23.9 pg  Mean Cell Hemoglobin Concentration : 31.8 gm/dL  Auto Neutrophil # : x  Auto Lymphocyte # : x  Auto Monocyte # : x  Auto Eosinophil # : x  Auto Basophil # : x  Auto Neutrophil % : x  Auto Lymphocyte % : x  Auto Monocyte % : x  Auto Eosinophil % : x  Auto Basophil % : x    12-15    147<H>  |  104  |  14  ----------------------------<  117<H>  3.5   |  31  |  0.52    Ca    8.8      15 Dec 2021 05:51  Phos  4.1     -  Mg     2.3     -    TPro  6.7  /  Alb  4.0  /  TBili  0.4  /  DBili  x   /  AST  133<H>  /  ALT  84<H>  /  AlkPhos  114  12-14      ABG - ( 15 Dec 2021 13:33 )  pH, Arterial: 7.28  pH, Blood: x     /  pCO2: 78    /  pO2: 126   / HCO3: 37    / Base Excess: 8.1   /  SaO2: 99.8                    Urinalysis Basic - ( 15 Dec 2021 03:07 )    Color: Yellow / Appearance: Clear / S.026 / pH: x  Gluc: x / Ketone: Negative  / Bili: Negative / Urobili: 3 mg/dL   Blood: x / Protein: 100 mg/dL / Nitrite: Positive   Leuk Esterase: Moderate / RBC: 3 /hpf / WBC 68 /HPF   Sq Epi: x / Non Sq Epi: 0 /hpf / Bacteria: Many          ====================  Assessment & Plan:     87 y/o F with PMH of acromegaly, HTN, breast CA s/p L mastectomy. Presents to ED with c/o fatigue and bradycardia. Found to have +UA, started on abx. 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 MICU consulted for hypercapnia with decreased mentation. Repeat ABG with ongoing hypercapnia but stable pH. Admitted to MICU for likely metabolic encephalopathy 2/2 sepsis 2/2 UTI, also likely has underlying chronic hypercapnia        Plan      #Neuro  RRT for AMS 12/15, pt was initially AAOx2-3, became increasingly lethargic  -currently lethargic but arousable  -history of ICH 6 months ago s/p nsgy, on keppra for seizure ppx ever since  -GCS score 15. CTH  had chronic b/l basal ganglia details  -likely metabolic encephalopathy 2/2 sepsis 2/2 UTI, also likely has some underlying chronic hypercapnia  -c/w keppra 500 mg q12  -neuro checks q4  -neurology consulted, appreciate recs  -will plan for eventual MRI brain no con, rEEG  -holding home memantine due to side effect of bradycardia  -will send folate, b12, TSH, a1c, MMA, iron panel        #Cardiovascular  -pt was hasmukh to upper 30's during RRT on 12/15 evening, later came up 45-50. EKG showed sinus hasmukh, no AV block.  -cardiology consulted, per cards, not true bradycardia, more due to perfusive PVCs.  -TTE ordered but pt apparently refused, heart not well visualized.  -pBNP 3515  -lasix 20 mg IV ordered  -holding home toprol  mg daily for now    #Pulmonary  -ABG during RRT 12/15 7.28, pCO2 78, pO2 126, HCO3 37; VBG later showed pH 7.17, pCO2 85, pO2 38, HCO3 31  -likely has underlying chronic hypercapnia  -on BIPAP right now  -pulmonology following, appreciate recs  -per pulm would like CT chest once stable    #Renal  No active issues  -strict I/Os      #Infectious disease  -many bacteria on UA, + LE, moderate nitrite  -likely has sepsis 2/2 UTI, contributing to current lethargy  -f/u BCx x 2, UCx,   -started on zosyn (12/15 - )    #Hematology/Oncology  -platelets 166 -> 134, thrombocytopenic, will CTM  -maintain active T&S    #Gastrointestinal  -NPO for now.    #Endocrine  -FSGs wnl so far, 130 -> 100     #Psychiatric  No active issues    #PPx  DVT: lovenox    #Ethics  Full code  Sánchez is son, (HCP, still needs to bring forms in) [phone]    ====================    William Keen MD  Internal Medicine Resident  [phone] MICU Accept Note    TONIA MAJANO  86y  Patient is a 86y old  Female who presents with a chief complaint of bradycardia (15 Dec 2021 13:06)      ====================  HPI & Hospital Course:     This patient is an 85yo lady with PMH of acromegaly, htn, breast CA s/p L mastectomy who presents to the ED with complaint of fatigue and bradycardia. History was provided by son at bedside. The patient has had progressive decline in activity over the last few weeks. She has been sleeping more, eating less, and ambulating slower. She has intermittent confusion about dates, however she recognizes family. Son noticed her gait is much slower. She had no fever, chills, or vomiting or nausea. She has chronic urinary incontinence.   The patient was able to use an exercise bike for 15 minutes about 3-4 days ago without complaining of chest pain or dyspnea. She only has atraumatic back pain with exertion.  The patient was seen in the [place] ED on 2021 for abdominal pain, and discharged home with augmentin for proctocolitis.      Pt had rapid response 12/15 AM for AMS, pt was reportedly AAOx2-3 then became increasingly lethargic, yet still responsive to commands. ABG showed pH 7.28, pCO2 79, UA + many bacteria, + nitrite, moderate LE. BCx were sent, zosyn x1 given, AVAPS started, pt's mental status appeared to improve with AVAPS. Also sent procal, prolactin, CBC, CMP. 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 Neurology, Cardiology, Pulmonary consulted.    Pt had 2nd RRT on 12/15 evening for bradycardia down to upper 30's, sinus hasmukh to 45-50, BPs stable and satting well on BIPAP. EKG showed sinus hasmukh, no AV block. Kept on BIPAP.              Past Medical History:     HTN (hypertension)  Dementia without behavioral disturbance, unspecified dementia type  Acromegaly  History of left mastectomy  H/O total hysterectomy  H/O small bowel obstruction    Allergies  No Known Allergies    FAMILY HISTORY:  Family history of diabetes mellitus (DM) (Mother, Sibling)    Social history: former smoker     Review of Systems: unable to obtain 2nd to mental status     Past Surgical History:     Home Medications:  Crestor 20 mg oral tablet: 1 tab(s) orally once a day (15 Dec 2021 01:21)  levETIRAcetam 500 mg oral tablet: 1 tab(s) orally 2 times a day (15 Dec 2021 01:)  memantine 10 mg oral tablet: 1 tab(s) orally once a day (15 Dec 2021 01:)  Metoprolol Succinate  mg oral tablet, extended release: 1 tab(s) orally once a day (15 Dec 2021 01:)      Current Medications:   MEDICATIONS  (STANDING):  amLODIPine   Tablet 5 milliGRAM(s) Oral daily  atorvastatin 80 milliGRAM(s) Oral at bedtime  enoxaparin Injectable 40 milliGRAM(s) SubCutaneous daily  furosemide   Injectable 40 milliGRAM(s) IV Push once  hydrALAZINE Injectable 10 milliGRAM(s) IV Push four times a day  levETIRAcetam 500 milliGRAM(s) Oral two times a day  memantine 10 milliGRAM(s) Oral daily  piperacillin/tazobactam IVPB.. 3.375 Gram(s) IV Intermittent every 8 hours    MEDICATIONS  (PRN):  acetaminophen     Tablet .. 650 milliGRAM(s) Oral every 6 hours PRN Temp greater or equal to 38C (100.4F), Mild Pain (1 - 3)        ====================  Vital Signs Last 24 Hrs  T(C): 36.6 (15 Dec 2021 14:55), Max: 37 (15 Dec 2021 10:28)  T(F): 97.9 (15 Dec 2021 14:55), Max: 98.6 (15 Dec 2021 10:28)  HR: 55 (15 Dec 2021 14:55) (43 - 70)  BP: 152/79 (15 Dec 2021 14:55) (150/80 - 189/76)  BP(mean): 103 (15 Dec 2021 14:55) (103 - 103)  RR: 19 (15 Dec 2021 10:28) (17 - 19)  SpO2: 98% (15 Dec 2021 14:55) (95% - 99%)    Adult Advanced Hemodynamics Last 24 Hrs  CVP(mm Hg): --  CVP(cm H2O): --  CO: --  CI: --  PA: --  PA(mean): --  PCWP: --  SVR: --  SVRI: --  PVR: --  PVRI: --    Physical Exam:   General: non-distressed  HEENT: pupils equal  Neurology: arousable to voice, follows commands, GCS is 11 E3V2M6, MAGANA  Respiratory: clear bilaterally, mild diminishment R base, no wheezing. L breast implant.  CV: RRR, S1S2, no murmurs, rubs or gallops  Abdominal: Soft, NT, ND +BS  Extremities: No edema, +peripheral pulses      ====================  Labs & Imaging:   CBC Full  -  ( 15 Dec 2021 05:51 )  WBC Count : 7.19 K/uL  RBC Count : 4.48 M/uL  Hemoglobin : 10.7 g/dL  Hematocrit : 33.6 %  Platelet Count - Automated : 134 K/uL  Mean Cell Volume : 75.0 fl  Mean Cell Hemoglobin : 23.9 pg  Mean Cell Hemoglobin Concentration : 31.8 gm/dL  Auto Neutrophil # : x  Auto Lymphocyte # : x  Auto Monocyte # : x  Auto Eosinophil # : x  Auto Basophil # : x  Auto Neutrophil % : x  Auto Lymphocyte % : x  Auto Monocyte % : x  Auto Eosinophil % : x  Auto Basophil % : x    12-15    147<H>  |  104  |  14  ----------------------------<  117<H>  3.5   |  31  |  0.52    Ca    8.8      15 Dec 2021 05:51  Phos  4.1     -  Mg     2.3     -    TPro  6.7  /  Alb  4.0  /  TBili  0.4  /  DBili  x   /  AST  133<H>  /  ALT  84<H>  /  AlkPhos  114  12-14      ABG - ( 15 Dec 2021 13:33 )  pH, Arterial: 7.28  pH, Blood: x     /  pCO2: 78    /  pO2: 126   / HCO3: 37    / Base Excess: 8.1   /  SaO2: 99.8                    Urinalysis Basic - ( 15 Dec 2021 03:07 )    Color: Yellow / Appearance: Clear / S.026 / pH: x  Gluc: x / Ketone: Negative  / Bili: Negative / Urobili: 3 mg/dL   Blood: x / Protein: 100 mg/dL / Nitrite: Positive   Leuk Esterase: Moderate / RBC: 3 /hpf / WBC 68 /HPF   Sq Epi: x / Non Sq Epi: 0 /hpf / Bacteria: Many          ====================  Assessment & Plan:     87 y/o F with PMH of acromegaly, HTN, breast CA s/p L mastectomy. Presents to ED with c/o fatigue and bradycardia. Found to have +UA, started on abx. 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 MICU consulted for hypercapnia with decreased mentation. Repeat ABG with ongoing hypercapnia but stable pH. Admitted to MICU for likely metabolic encephalopathy 2/2 sepsis 2/2 UTI, also likely has underlying chronic hypercapnia        Plan      #Neuro  RRT for AMS 12/15, pt was initially AAOx2-3, became increasingly lethargic  -currently lethargic but arousable  -history of ICH 6 months ago s/p nsgy, on keppra for seizure ppx ever since  -GCS score 15. CTH  had chronic b/l basal ganglia details  -likely metabolic encephalopathy 2/2 sepsis 2/2 UTI, also likely has some underlying chronic hypercapnia  -c/w keppra 500 mg q12  -neuro checks q4  -neurology consulted, appreciate recs  -will plan for eventual MRI brain no con, rEEG  -holding home memantine due to side effect of bradycardia  -will send folate, b12, TSH, a1c, MMA, iron panel        #Cardiovascular  -pt was hasmukh to upper 30's during RRT on 12/15 evening, later came up 45-50. EKG showed sinus hasmukh, no AV block.  -cardiology consulted, per cards, not true bradycardia, more due to perfusive PVCs.  -TTE ordered but pt apparently refused, heart not well visualized.  -pBNP 3515  -lasix 20 mg IV ordered  -holding home toprol  mg daily for now    #Pulmonary  -ABG during RRT 12/15 7.28, pCO2 78, pO2 126, HCO3 37; VBG later showed pH 7.17, pCO2 85, pO2 38, HCO3 31  -likely has underlying chronic hypercapnia  -on BIPAP right now  -pulmonology following, appreciate recs  -per pulm would like CT chest once stable    #Renal  No active issues  -strict I/Os      #Infectious disease  -many bacteria on UA, + LE, moderate nitrite  -likely has sepsis 2/2 UTI, contributing to current lethargy  -f/u BCx x 2, UCx,   -started on zosyn (12/15 - )    #Hematology/Oncology  -platelets 166 -> 134, thrombocytopenic, will CTM  -maintain active T&S    #Gastrointestinal  -NPO for now. S/S eval pending  -FSGs wnl so far, 130 -> 100     #Psychiatric  No active issues    #PPx  DVT: lovenox    #Ethics  Full code  Sánchez is son, (HCP, still needs to bring forms in) [phone]    ====================    William Keen MD  Internal Medicine Resident  [phone] MICU Accept Note    TONIA MAJANO  86y  Patient is a 86y old  Female who presents with a chief complaint of bradycardia (15 Dec 2021 13:06)      ====================  HPI & Hospital Course:     This patient is an 87yo lady with PMH of acromegaly, htn, breast CA s/p L mastectomy who presents to the ED with complaint of fatigue and bradycardia. History was provided by son at bedside. The patient has had progressive decline in activity over the last few weeks. She has been sleeping more, eating less, and ambulating slower. She has intermittent confusion about dates, however she recognizes family. Son noticed her gait is much slower. She had no fever, chills, or vomiting or nausea. She has chronic urinary incontinence.   The patient was able to use an exercise bike for 15 minutes about 3-4 days ago without complaining of chest pain or dyspnea. She only has atraumatic back pain with exertion.  The patient was seen in the [facility] ED on 2021 for abdominal pain, and discharged home with augmentin for proctocolitis.      Pt had rapid response 12/15 AM for AMS, pt was reportedly AAOx2-3 then became increasingly lethargic, yet still responsive to commands. ABG showed pH 7.28, pCO2 79, UA + many bacteria, + nitrite, moderate LE. BCx were sent, zosyn x1 given, AVAPS started, pt's mental status appeared to improve with AVAPS. Also sent procal, prolactin, CBC, CMP. 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 Neurology, Cardiology, Pulmonary consulted.    Pt had 2nd RRT on 12/15 evening for bradycardia down to upper 30's, sinus hasmukh to 45-50, BPs stable and satting well on BIPAP. EKG showed sinus hasmukh, no AV block. Kept on BIPAP.              Past Medical History:     HTN (hypertension)  Dementia without behavioral disturbance, unspecified dementia type  Acromegaly  History of left mastectomy  H/O total hysterectomy  H/O small bowel obstruction    Allergies  No Known Allergies    FAMILY HISTORY:  Family history of diabetes mellitus (DM) (Mother, Sibling)    Social history: former smoker     Review of Systems: unable to obtain 2nd to mental status     Past Surgical History:     Home Medications:  Crestor 20 mg oral tablet: 1 tab(s) orally once a day (15 Dec 2021 01:21)  levETIRAcetam 500 mg oral tablet: 1 tab(s) orally 2 times a day (15 Dec 2021 01:)  memantine 10 mg oral tablet: 1 tab(s) orally once a day (15 Dec 2021 01:)  Metoprolol Succinate  mg oral tablet, extended release: 1 tab(s) orally once a day (15 Dec 2021 01:)      Current Medications:   MEDICATIONS  (STANDING):  amLODIPine   Tablet 5 milliGRAM(s) Oral daily  atorvastatin 80 milliGRAM(s) Oral at bedtime  enoxaparin Injectable 40 milliGRAM(s) SubCutaneous daily  furosemide   Injectable 40 milliGRAM(s) IV Push once  hydrALAZINE Injectable 10 milliGRAM(s) IV Push four times a day  levETIRAcetam 500 milliGRAM(s) Oral two times a day  memantine 10 milliGRAM(s) Oral daily  piperacillin/tazobactam IVPB.. 3.375 Gram(s) IV Intermittent every 8 hours    MEDICATIONS  (PRN):  acetaminophen     Tablet .. 650 milliGRAM(s) Oral every 6 hours PRN Temp greater or equal to 38C (100.4F), Mild Pain (1 - 3)        ====================  Vital Signs Last 24 Hrs  T(C): 36.6 (15 Dec 2021 14:55), Max: 37 (15 Dec 2021 10:28)  T(F): 97.9 (15 Dec 2021 14:55), Max: 98.6 (15 Dec 2021 10:28)  HR: 55 (15 Dec 2021 14:55) (43 - 70)  BP: 152/79 (15 Dec 2021 14:55) (150/80 - 189/76)  BP(mean): 103 (15 Dec 2021 14:55) (103 - 103)  RR: 19 (15 Dec 2021 10:28) (17 - 19)  SpO2: 98% (15 Dec 2021 14:55) (95% - 99%)    Adult Advanced Hemodynamics Last 24 Hrs  CVP(mm Hg): --  CVP(cm H2O): --  CO: --  CI: --  PA: --  PA(mean): --  PCWP: --  SVR: --  SVRI: --  PVR: --  PVRI: --    Physical Exam:   General: non-distressed  HEENT: pupils equal  Neurology: arousable to voice, follows commands, GCS is 11 E3V2M6, MAGANA  Respiratory: clear bilaterally, mild diminishment R base, no wheezing. L breast implant.  CV: RRR, S1S2, no murmurs, rubs or gallops  Abdominal: Soft, NT, ND +BS  Extremities: No edema, +peripheral pulses      ====================  Labs & Imaging:   CBC Full  -  ( 15 Dec 2021 05:51 )  WBC Count : 7.19 K/uL  RBC Count : 4.48 M/uL  Hemoglobin : 10.7 g/dL  Hematocrit : 33.6 %  Platelet Count - Automated : 134 K/uL  Mean Cell Volume : 75.0 fl  Mean Cell Hemoglobin : 23.9 pg  Mean Cell Hemoglobin Concentration : 31.8 gm/dL  Auto Neutrophil # : x  Auto Lymphocyte # : x  Auto Monocyte # : x  Auto Eosinophil # : x  Auto Basophil # : x  Auto Neutrophil % : x  Auto Lymphocyte % : x  Auto Monocyte % : x  Auto Eosinophil % : x  Auto Basophil % : x    12-15    147<H>  |  104  |  14  ----------------------------<  117<H>  3.5   |  31  |  0.52    Ca    8.8      15 Dec 2021 05:51  Phos  4.1     -  Mg     2.3     -    TPro  6.7  /  Alb  4.0  /  TBili  0.4  /  DBili  x   /  AST  133<H>  /  ALT  84<H>  /  AlkPhos  114  12-14      ABG - ( 15 Dec 2021 13:33 )  pH, Arterial: 7.28  pH, Blood: x     /  pCO2: 78    /  pO2: 126   / HCO3: 37    / Base Excess: 8.1   /  SaO2: 99.8                    Urinalysis Basic - ( 15 Dec 2021 03:07 )    Color: Yellow / Appearance: Clear / S.026 / pH: x  Gluc: x / Ketone: Negative  / Bili: Negative / Urobili: 3 mg/dL   Blood: x / Protein: 100 mg/dL / Nitrite: Positive   Leuk Esterase: Moderate / RBC: 3 /hpf / WBC 68 /HPF   Sq Epi: x / Non Sq Epi: 0 /hpf / Bacteria: Many          ====================  Assessment & Plan:     85 y/o F with PMH of acromegaly, HTN, breast CA s/p L mastectomy. Presents to ED with c/o fatigue and bradycardia. Found to have +UA, started on abx. 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 MICU consulted for hypercapnia with decreased mentation. Repeat ABG with ongoing hypercapnia but stable pH. Admitted to MICU for likely metabolic encephalopathy 2/2 sepsis 2/2 UTI, also likely has underlying chronic hypercapnia        Plan      #Neuro  RRT for AMS 12/15, pt was initially AAOx2-3, became increasingly lethargic  -currently lethargic but arousable  -history of ICH 6 months ago s/p nsgy, on keppra for seizure ppx ever since  -GCS score 15. CTH  had chronic b/l basal ganglia details  -likely metabolic encephalopathy 2/2 sepsis 2/2 UTI, also likely has some underlying chronic hypercapnia  -c/w keppra 500 mg q12  -neuro checks q4  -neurology consulted, appreciate recs  -will plan for eventual MRI brain no con, rEEG  -holding home memantine due to side effect of bradycardia  -will send folate, b12, TSH, a1c, MMA, iron panel        #Cardiovascular  -pt was hasmukh to upper 30's during RRT on 12/15 evening, later came up 45-50. EKG showed sinus hasmukh, no AV block.  -cardiology consulted, per cards, not true bradycardia, more due to perfusive PVCs.  -TTE ordered but pt apparently refused, heart not well visualized.  -pBNP 3515  -lasix 20 mg IV ordered  -holding home toprol  mg daily for now    #Pulmonary  -ABG during RRT 12/15 7.28, pCO2 78, pO2 126, HCO3 37; VBG later showed pH 7.17, pCO2 85, pO2 38, HCO3 31  -likely has underlying chronic hypercapnia  -on BIPAP right now  -pulmonology following, appreciate recs  -per pulm would like CT chest once stable    #Renal  No active issues  -strict I/Os      #Infectious disease  -many bacteria on UA, + LE, moderate nitrite  -likely has sepsis 2/2 UTI, contributing to current lethargy  -f/u BCx x 2, UCx,   -started on zosyn (12/15 - )    #Hematology/Oncology  -platelets 166 -> 134, thrombocytopenic, will CTM  -maintain active T&S    #Gastrointestinal  -NPO for now. S/S eval pending  -FSGs wnl so far, 130 -> 100     #Psychiatric  No active issues    #PPx  DVT: lovenox    #Ethics  Full code  Sánchez is son, (HCP, still needs to bring forms in) [phone]    ====================    William Keen MD  Internal Medicine Resident  [phone]          ----------------Attending attestation---------  85 y/o F with  acromegaly, HTN, breast CA s/p L mastectomy, baseline functional with all her adls, presented with bradycardia and encephalopathy, found to have severe sepsis from UTI w/o septic shock.  Complicated by acute on chronic hypercapnic respiratory failure. Avaps adjusted, continue with zosyn for UTI pending urine cultures.  Will try off AVAPs in am.   Holding metoprolol, will give PRN hydral for hypertensive urgency if BP presists (seems agitation driven at this time.  This was discussed with her 2 sons, one at bedside and a physician son over the phon.   Full code.  DVT ppx with lovenox  POcus with limited views but normal LVF and plump IVC.

## 2021-12-15 NOTE — RAPID RESPONSE TEAM SUMMARY - [id number]_GEN_ALL_CORE
RRT called for bradycardia to ra upper 30's. On arrival of RRT team, patient was in sinus bradycardia on the cardiac monitor with HR 45-50. BPs stable/normal. Satting well on BiPAP. Patient alert, tolerating BiPAP mask. EKG obtained, showing sinus bradycardia with no AV block. Repeat BP normal. AM labs reviewed, no electrolyte derangements noted.

## 2021-12-15 NOTE — CONSULT NOTE ADULT - ATTENDING COMMENTS
85yo Rt handed F with  PMH of acromegaly, hx of dementia, htn, breast CA s/p L mastectomy, ICH, RRT this morning for AMS and bradycardia. Pt with elevated CO2 at 79 and started on BIPAP with some improvement of mental status. Pt has remote hx of smoking ,stopped in 1980, no known lung disease. Pt's urinalysis also positive for UTI and she was started on antibiotics. She was seen and examined at bedside and she was on BIPAP, doesn't really open her eyes, spontaneous movements of her mouth and UEs, not following commands. Likely metabolic encephalopathy 2/2 urosepsis.   - Rpt VBG with Co2 not improved.  * Pt had another RRT and being transferred to MICU for bradycardia.
MIVU consult  pt close to need for intubation  Check abg, continue avaps  prognosis guarded

## 2021-12-15 NOTE — H&P ADULT - HISTORY OF PRESENT ILLNESS
This patient is an 85yo lady with PMH of acromegaly, htn, breast CA s/p L mastectomy who presents to the ED with complaint of fatigue and bradycardia. The patient  This patient is an 85yo lady with PMH of acromegaly, htn, breast CA s/p L mastectomy who presents to the ED with complaint of fatigue and bradycardia. History was provided by son at bedside. The patient has had progressive decline in activity over the last few weeks. She has been sleeping more, eating less, and ambulating slower. She has intermittent confusion about dates, however she recognizes family. Son noticed her gait is much slower. She had no fever, chills, or vomiting or nausea. She has chronic urinary incontinence.   The patient was able to use an exercise bike for 15 minutes about 3-4 days ago without complaining of chest pain or dyspnea. She only has atraumatic back pain with exertion.  The patient was seen in the [facility] ED on 12/4/2021 for abdominal pain, and discharged home with augmentin for proctocolitis.

## 2021-12-15 NOTE — H&P ADULT - ASSESSMENT
This patient is an 85yo lady with PMH of acromegaly, htn, breast CA s/p L mastectomy who presents to the ED with complaint of fatigue and bradycardia. History was provided by son at bedside. The patient has had progressive decline in activity over the last few weeks. She has been sleeping more, eating less, and ambulating slower. She has intermittent confusion about dates, however she recognizes family. Son noticed her gait is much slower. She had no fever, chills, or vomiting or nausea. She has chronic urinary incontinence.   The patient was able to use an exercise bike for 15 minutes about 3-4 days ago without complaining of chest pain or dyspnea. She only has atraumatic back pain with exertion.

## 2021-12-15 NOTE — RAPID RESPONSE TEAM SUMMARY - NSADDTLFINDINGSRRT_GEN_ALL_CORE
LE edema noted. Vitals- HTN, temp WNL, HR60s, sat well on NC, patient appears lethargic, responsive to commands, able to move all extremities, no focal deficits noted. ABG notable for pH 7.28, pCO2 79. UA positive for bacteria LE edema noted. Vitals- HTN, temp WNL, HR60s, sat well on NC, patient appears lethargic but responsive to commands, able to move all extremities, no focal deficits noted. ABG notable for pH 7.28, pCO2 79. UA positive for bacteria

## 2021-12-15 NOTE — PROGRESS NOTE ADULT - ASSESSMENT
This patient is an 87yo lady with PMH of acromegaly, htn, breast CA s/p L mastectomy who presents to the ED with complaint of fatigue and bradycardia. History was provided by son at bedside. The patient has had progressive decline in activity over the last few weeks. She has been sleeping more, eating less, and ambulating slower. She has intermittent confusion about dates, however she recognizes family. Son noticed her gait is much slower. She had no fever, chills, or vomiting or nausea. She has chronic urinary incontinence.   The patient was able to use an exercise bike for 15 minutes about 3-4 days ago without complaining of chest pain or dyspnea. She only has atraumatic back pain with exertion.

## 2021-12-15 NOTE — CONSULT NOTE ADULT - SUBJECTIVE AND OBJECTIVE BOX
[id number]  Patient is a 86y old  Female who presents with a chief complaint of bradycardia (15 Dec 2021 06:31)    HPI:  Patient is a  87yo Rt handed F with  PMH of acromegaly, htn, breast CA s/p L mastectomy, ICH 6 months ago in [place] who presents [facility]  with complaint of fatigue and bradycardia. Per chart review, so provided history and mentioned that patient has had progressive decline in activity over the last few weeks. Patient has had gradual fatigues and has been sleeping more with less appetite and ambulating slower. She has intermittent confusion about dates, however she recognizes family. Son noticed her gait is much slower. She had no fever, chills, or vomiting or nausea. She has chronic urinary incontinence. The patient was able to use an exercise bike for 15 minutes about 3-4 days ago without complaining of chest pain or dyspnea. She only has atraumatic back pain with exertion.  The patient was seen in the [facility] ED on 2021 for abdominal pain, and discharged home with augmentin for proctocolitis.     Neurology consulted for AMS as part RRT. Patient was seen at bedside with son. Per RRT team, patient was noted to have mental status change around 0400am. Patient's son at bedside mentioned he last spoke to his mother at 2145 which is patient's  LWK. Patient was found to hypercarbic in the CO2.       PAST MEDICAL & SURGICAL HISTORY:  HTN (hypertension)    Dementia without behavioral disturbance, unspecified dementia type    Acromegaly    History of left mastectomy    H/O total hysterectomy    H/O small bowel obstruction      FAMILY HISTORY:  Family history of diabetes mellitus (DM) (Mother, Sibling)      Social Hx:  Nonsmoker, no drug or alcohol use    Home Medications:  Crestor 20 mg oral tablet: 1 tab(s) orally once a day (15 Dec 2021 01:21)  levETIRAcetam 500 mg oral tablet: 1 tab(s) orally 2 times a day (15 Dec 2021 01:21)  memantine 10 mg oral tablet: 1 tab(s) orally once a day (15 Dec 2021 01:21)  Metoprolol Succinate  mg oral tablet, extended release: 1 tab(s) orally once a day (15 Dec 2021 01:21)    MEDICATIONS  (STANDING):  amLODIPine   Tablet 5 milliGRAM(s) Oral daily  atorvastatin 80 milliGRAM(s) Oral at bedtime  enoxaparin Injectable 40 milliGRAM(s) SubCutaneous daily  hydrALAZINE Injectable 10 milliGRAM(s) IV Push four times a day  levETIRAcetam 500 milliGRAM(s) Oral two times a day  memantine 10 milliGRAM(s) Oral daily  piperacillin/tazobactam IVPB. 3.375 Gram(s) IV Intermittent once    MEDICATIONS  (PRN):  acetaminophen     Tablet .. 650 milliGRAM(s) Oral every 6 hours PRN Temp greater or equal to 38C (100.4F), Mild Pain (1 - 3)    Allergies  No Known Allergies    Intolerances      REVIEW OF SYSTEMS  General:	  Skin/Breast:	  Ophthalmologic:  ENMT:	  Respiratory and Thorax:	  Cardiovascular:	  Gastrointestinal:	  Genitourinary:	  Musculoskeletal:	  Neurological:	  Psychiatric:	  Hematology/Lymphatics:	  Endocrine:	  Allergic/Immunologic:	    ROS: Pertinent positives in HPI, all other ROS were reviewed and are negative.      Vital Signs Last 24 Hrs  T(C): 36.7 (14 Dec 2021 20:16), Max: 36.7 (14 Dec 2021 20:16)  T(F): 98.1 (14 Dec 2021 20:16), Max: 98.1 (14 Dec 2021 20:16)  HR: 70 (15 Dec 2021 04:52) (43 - 70)  BP: 180/86 (15 Dec 2021 04:52) (169/77 - 189/76)  BP(mean): --  RR: 18 (15 Dec 2021 04:52) (17 - 18)  SpO2: 96% (15 Dec 2021 06:43) (95% - 99%)    GENERAL EXAM:  Constitutional: awake and alert. NAD  HEENT: PERRLA, EOMI  Neck: Supple  Respiratory: Breath sounds are clear bilaterally  Cardiovascular: S1 and S2, regular / irregular rhythm  Gastrointestinal: soft, nontender  Extremities: no edema, no cyanosis  Vascular: no carotid bruits  Musculoskeletal: no joint swelling/tenderness, no abnormal movements  Skin: no rashes    NEUROLOGICAL EXAM:  MS: AAOX3, fluent, attends b/l; recent and remote memory intact; normal attention, language and fund of knowledge.   CN: VFF, EOMI, PERRL, no BUSHRA, no APD,  V1-3 intact, no facial asymmetry, t/p midline, SCM/trap intact.  Eyes-Fundi: no papilledema.  Motor: Strength: 5/5 4x. Tone: normal. Bulk: normal. DTR 2+ symm.  Plantar flex b/l. Sensation: intact to LT/PP/Vibration/Position/Temperature 4x.   Coordination: intact 4x.   Gait:  Romberg negative, pull test negative; walks with narrow base, pivots in 2 steps.    NIHSS  mRS    Labs:   cbc                      10.7   7.19  )-----------( 134      ( 15 Dec 2021 05:51 )             33.6     Chem12-15    147<H>  |  104  |  14  ----------------------------<  117<H>  3.5   |  31  |  0.52    Ca    8.8      15 Dec 2021 05:51  Phos  4.1     12-14  Mg     2.3     12-14    TPro  6.7  /  Alb  4.0  /  TBili  0.4  /  DBili  x   /  AST  133<H>  /  ALT  84<H>  /  AlkPhos  114  12-14    Coags  Lipids  A1C  CardiacMarkers    LFTsLIVER FUNCTIONS - ( 14 Dec 2021 21:13 )  Alb: 4.0 g/dL / Pro: 6.7 g/dL / ALK PHOS: 114 U/L / ALT: 84 U/L / AST: 133 U/L / GGT: x           UAUrinalysis Basic - ( 15 Dec 2021 03:07 )    Color: Yellow / Appearance: Clear / S.026 / pH: x  Gluc: x / Ketone: Negative  / Bili: Negative / Urobili: 3 mg/dL   Blood: x / Protein: 100 mg/dL / Nitrite: Positive   Leuk Esterase: Moderate / RBC: 3 /hpf / WBC 68 /HPF   Sq Epi: x / Non Sq Epi: 0 /hpf / Bacteria: Many      CSF  Immunological Labs    Radiology:  -CT Head  -MRI brain  -MRA brain/Carotids  -EEG  -EKG  -TTE/MENG [id number]  Patient is a 86y old  Female who presents with a chief complaint of bradycardia (15 Dec 2021 06:31)    HPI:  Patient is a  85yo Rt handed F with  PMH of acromegaly, htn, breast CA s/p L mastectomy, ICH 6 months ago in [place] who presents [facility]  with complaint of fatigue and bradycardia. Per chart review, so provided history and mentioned that patient has had progressive decline in activity over the last few weeks. Patient has had gradual fatigues and has been sleeping more with less appetite and ambulating slower. She has intermittent confusion about dates, however she recognizes family. Son noticed her gait is much slower. She had no fever, chills, or vomiting or nausea. She has chronic urinary incontinence. The patient was able to use an exercise bike for 15 minutes about 3-4 days ago without complaining of chest pain or dyspnea. She only has atraumatic back pain with exertion.  The patient was seen in the [facility] ED on 2021 for abdominal pain, and discharged home with augmentin for proctocolitis.     Neurology consulted for AMS as part RRT. Patient was seen at bedside with son. Per RRT team, patient was noted to have mental status change around 0400am. Patient's son at bedside mentioned he last spoke to his mother at 2145 which is patient's  LWK. Patient was found to hypercarbic in the CO2 79. Patient had generalized weakness with no focality noted. Patient has UTI and questionable history seizures. After discussion with Stroke Fellow, CVA less likely due to no focality symptoms. Patient mental status improved upon receiving oxygen supplementation.       PAST MEDICAL & SURGICAL HISTORY:  HTN (hypertension)    Dementia without behavioral disturbance, unspecified dementia type    Acromegaly    History of left mastectomy    H/O total hysterectomy    H/O small bowel obstruction      FAMILY HISTORY:  Family history of diabetes mellitus (DM) (Mother, Sibling)      Social Hx:  Nonsmoker, no drug or alcohol use    Home Medications:  Crestor 20 mg oral tablet: 1 tab(s) orally once a day (15 Dec 2021 01:21)  levETIRAcetam 500 mg oral tablet: 1 tab(s) orally 2 times a day (15 Dec 2021 01:21)  memantine 10 mg oral tablet: 1 tab(s) orally once a day (15 Dec 2021 01:21)  Metoprolol Succinate  mg oral tablet, extended release: 1 tab(s) orally once a day (15 Dec 2021 01:21)    MEDICATIONS  (STANDING):  amLODIPine   Tablet 5 milliGRAM(s) Oral daily  atorvastatin 80 milliGRAM(s) Oral at bedtime  enoxaparin Injectable 40 milliGRAM(s) SubCutaneous daily  hydrALAZINE Injectable 10 milliGRAM(s) IV Push four times a day  levETIRAcetam 500 milliGRAM(s) Oral two times a day  memantine 10 milliGRAM(s) Oral daily  piperacillin/tazobactam IVPB. 3.375 Gram(s) IV Intermittent once    MEDICATIONS  (PRN):  acetaminophen     Tablet .. 650 milliGRAM(s) Oral every 6 hours PRN Temp greater or equal to 38C (100.4F), Mild Pain (1 - 3)    Allergies  No Known Allergies    Intolerances      REVIEW OF SYSTEMS	    ROS:unable to assess.     Vital Signs Last 24 Hrs  T(C): 36.7 (14 Dec 2021 20:16), Max: 36.7 (14 Dec 2021 20:16)  T(F): 98.1 (14 Dec 2021 20:16), Max: 98.1 (14 Dec 2021 20:16)  HR: 70 (15 Dec 2021 04:52) (43 - 70)  BP: 180/86 (15 Dec 2021 04:52) (169/77 - 189/76)  BP(mean): --  RR: 18 (15 Dec 2021 04:52) (17 - 18)  SpO2: 96% (15 Dec 2021 06:43) (95% - 99%)    GENERAL EXAM:  Constitutional: awake and alert. NAD  HEENT: PERRL, EOMI  Neck: Supple  Extremities: no edema, no cyanosis  Musculoskeletal: no joint swelling/tenderness, no abnormal movements  Skin: no rashes    NEUROLOGICAL EXAM:  MS: Awake to verbal response to Name. Patient follows simple commands.   CN: VFF, EOMI, PERRL  V1-3 intact, no facial asymmetry, t/p midline, SCM/trap intact.  Motor: Strength: 3/5 in the UE and LE b/l.   Drift in all extremities   Tone: normal. Bulk: normal.   Plantar flex b/l.   Sensation: intact to LT/Temperature 4x.   Coordination: Deferred  Gait: Deferred    NIHSS  5  mRS 0    Labs:   cbc                      10.7   7.19  )-----------( 134      ( 15 Dec 2021 05:51 )             33.6     Chem12-15    147<H>  |  104  |  14  ----------------------------<  117<H>  3.5   |  31  |  0.52    Ca    8.8      15 Dec 2021 05:51  Phos  4.1     12-  Mg     2.3     -    TPro  6.7  /  Alb  4.0  /  TBili  0.4  /  DBili  x   /  AST  133<H>  /  ALT  84<H>  /  AlkPhos  114  12-14      LFTsLIVER FUNCTIONS - ( 14 Dec 2021 21:13 )  Alb: 4.0 g/dL / Pro: 6.7 g/dL / ALK PHOS: 114 U/L / ALT: 84 U/L / AST: 133 U/L / GGT: x           UAUrinalysis Basic - ( 15 Dec 2021 03:07 )    Color: Yellow / Appearance: Clear / S.026 / pH: x  Gluc: x / Ketone: Negative  / Bili: Negative / Urobili: 3 mg/dL   Blood: x / Protein: 100 mg/dL / Nitrite: Positive   Leuk Esterase: Moderate / RBC: 3 /hpf / WBC 68 /HPF   Sq Epi: x / Non Sq Epi: 0 /hpf / Bacteria: Many    Radiology:  CT head w/o contrast: NTERPRETATION:  no interval change when compared with CT head 2021. [id number]  Patient is a 86y old  Female who presents with a chief complaint of bradycardia (15 Dec 2021 06:31)    HPI:  Patient is a  87yo Rt handed F with  PMH of acromegaly, hx of dementia, htn, breast CA s/p L mastectomy, ICH 6 months ago in [place] who presents [facility]  with complaint of fatigue and bradycardia. Per chart review, so provided history and mentioned that patient has had progressive decline in activity over the last few weeks. Patient has had gradual fatigues and has been sleeping more with less appetite and ambulating slower. She has intermittent confusion about dates, however she recognizes family. Son noticed her gait is much slower. She had no fever, chills, or vomiting or nausea. She has chronic urinary incontinence. The patient was able to use an exercise bike for 15 minutes about 3-4 days ago without complaining of chest pain or dyspnea. She only has atraumatic back pain with exertion.  The patient was seen in the [facility] ED on 2021 for abdominal pain, and discharged home with augmentin for proctocolitis.     Neurology consulted for AMS as part RRT. Patient was seen at bedside with son. Per RRT team, patient was noted to have mental status change around 0400am. Patient's son at bedside mentioned he last spoke to his mother at 2145 which is patient's  LWK. Patient was found to hypercarbic in the CO2 79. Patient had generalized weakness with no focality noted. Patient has UTI and questionable history seizures. After discussion with Stroke Fellow, CVA less likely due to no focality symptoms. Patient mental status improved upon receiving oxygen supplementation.       PAST MEDICAL & SURGICAL HISTORY:  HTN (hypertension)    Dementia without behavioral disturbance, unspecified dementia type    Acromegaly    History of left mastectomy    H/O total hysterectomy    H/O small bowel obstruction      FAMILY HISTORY:  Family history of diabetes mellitus (DM) (Mother, Sibling)      Social Hx:  Nonsmoker, no drug or alcohol use    Home Medications:  Crestor 20 mg oral tablet: 1 tab(s) orally once a day (15 Dec 2021 01:21)  levETIRAcetam 500 mg oral tablet: 1 tab(s) orally 2 times a day (15 Dec 2021 01:21)  memantine 10 mg oral tablet: 1 tab(s) orally once a day (15 Dec 2021 01:21)  Metoprolol Succinate  mg oral tablet, extended release: 1 tab(s) orally once a day (15 Dec 2021 01:21)    MEDICATIONS  (STANDING):  amLODIPine   Tablet 5 milliGRAM(s) Oral daily  atorvastatin 80 milliGRAM(s) Oral at bedtime  enoxaparin Injectable 40 milliGRAM(s) SubCutaneous daily  hydrALAZINE Injectable 10 milliGRAM(s) IV Push four times a day  levETIRAcetam 500 milliGRAM(s) Oral two times a day  memantine 10 milliGRAM(s) Oral daily  piperacillin/tazobactam IVPB. 3.375 Gram(s) IV Intermittent once    MEDICATIONS  (PRN):  acetaminophen     Tablet .. 650 milliGRAM(s) Oral every 6 hours PRN Temp greater or equal to 38C (100.4F), Mild Pain (1 - 3)    Allergies  No Known Allergies    Intolerances      REVIEW OF SYSTEMS	    ROS:unable to assess.     Vital Signs Last 24 Hrs  T(C): 36.7 (14 Dec 2021 20:16), Max: 36.7 (14 Dec 2021 20:16)  T(F): 98.1 (14 Dec 2021 20:16), Max: 98.1 (14 Dec 2021 20:16)  HR: 70 (15 Dec 2021 04:52) (43 - 70)  BP: 180/86 (15 Dec 2021 04:52) (169/77 - 189/76)  BP(mean): --  RR: 18 (15 Dec 2021 04:52) (17 - 18)  SpO2: 96% (15 Dec 2021 06:43) (95% - 99%)    GENERAL EXAM:  Constitutional: awake and alert.  HEENT: PERRL, EOMI  Neck: Supple  Extremities: no edema, no cyanosis  Musculoskeletal: no joint swelling/tenderness, no abnormal movements  Skin: no rashes    NEUROLOGICAL EXAM:  MS: Awake to verbal response to Name. Patient follows simple commands.   CN: EOMI, PERRL  V1-3 intact, no facial asymmetry,   Motor: Strength: moves all extremities spontaneously. Able to localize pain and able to grab bipap mask b/l.   Drift in all extremities   Tone: normal. Bulk: normal.   Plantar flex b/l.   Sensation: intact to LT/Temperature 4x.   Coordination: Deferred  Gait: Deferred    NIHSS  &  mRS 0    Labs:   cbc                      10.7   7.19  )-----------( 134      ( 15 Dec 2021 05:51 )             33.6     Chem12-15    147<H>  |  104  |  14  ----------------------------<  117<H>  3.5   |  31  |  0.52    Ca    8.8      15 Dec 2021 05:51  Phos  4.1     12-  Mg     2.3     -    TPro  6.7  /  Alb  4.0  /  TBili  0.4  /  DBili  x   /  AST  133<H>  /  ALT  84<H>  /  AlkPhos  114  12-14      LFTsLIVER FUNCTIONS - ( 14 Dec 2021 21:13 )  Alb: 4.0 g/dL / Pro: 6.7 g/dL / ALK PHOS: 114 U/L / ALT: 84 U/L / AST: 133 U/L / GGT: x           UAUrinalysis Basic - ( 15 Dec 2021 03:07 )    Color: Yellow / Appearance: Clear / S.026 / pH: x  Gluc: x / Ketone: Negative  / Bili: Negative / Urobili: 3 mg/dL   Blood: x / Protein: 100 mg/dL / Nitrite: Positive   Leuk Esterase: Moderate / RBC: 3 /hpf / WBC 68 /HPF   Sq Epi: x / Non Sq Epi: 0 /hpf / Bacteria: Many    Radiology:  CT head w/o contrast: NTERPRETATION:  no interval change when compared with CT head 2021. [id number]  Patient is a 86y old  Female who presents with a chief complaint of bradycardia (15 Dec 2021 06:31)    HPI:  Patient is a  87yo Rt handed F with  PMH of acromegaly, hx of dementia, htn, breast CA s/p L mastectomy, ICH 6 months ago in [place] who presents [facility]  with complaint of fatigue and bradycardia. Per chart review, so provided history and mentioned that patient has had progressive decline in activity over the last few weeks. Patient has had gradual fatigues and has been sleeping more with less appetite and ambulating slower. She has intermittent confusion about dates, however she recognizes family. Son noticed her gait is much slower. She had no fever, chills, or vomiting or nausea. She has chronic urinary incontinence. The patient was able to use an exercise bike for 15 minutes about 3-4 days ago without complaining of chest pain or dyspnea. She only has atraumatic back pain with exertion.  The patient was seen in the [facility] ED on 2021 for abdominal pain, and discharged home with augmentin for proctocolitis.     Neurology consulted for AMS as part RRT. Patient was seen at bedside with son. Per RRT team, patient was noted to have mental status change around 0400am. Patient's son at bedside mentioned he last spoke to his mother at 2145 which is patient's  LWK. Patient was found to hypercarbic in the CO2 79. Patient had generalized weakness with no focality noted. Patient has UTI and questionable history seizures. After discussion with Stroke Fellow, CVA less likely due to no focality symptoms. Patient mental status improved upon receiving oxygen supplementation.       PAST MEDICAL & SURGICAL HISTORY:  HTN (hypertension)    Dementia without behavioral disturbance, unspecified dementia type    Acromegaly    History of left mastectomy    H/O total hysterectomy    H/O small bowel obstruction      FAMILY HISTORY:  Family history of diabetes mellitus (DM) (Mother, Sibling)      Social Hx:  Nonsmoker, no drug or alcohol use    Home Medications:  Crestor 20 mg oral tablet: 1 tab(s) orally once a day (15 Dec 2021 01:21)  levETIRAcetam 500 mg oral tablet: 1 tab(s) orally 2 times a day (15 Dec 2021 01:21)  memantine 10 mg oral tablet: 1 tab(s) orally once a day (15 Dec 2021 01:21)  Metoprolol Succinate  mg oral tablet, extended release: 1 tab(s) orally once a day (15 Dec 2021 01:21)    MEDICATIONS  (STANDING):  amLODIPine   Tablet 5 milliGRAM(s) Oral daily  atorvastatin 80 milliGRAM(s) Oral at bedtime  enoxaparin Injectable 40 milliGRAM(s) SubCutaneous daily  hydrALAZINE Injectable 10 milliGRAM(s) IV Push four times a day  levETIRAcetam 500 milliGRAM(s) Oral two times a day  memantine 10 milliGRAM(s) Oral daily  piperacillin/tazobactam IVPB. 3.375 Gram(s) IV Intermittent once    MEDICATIONS  (PRN):  acetaminophen     Tablet .. 650 milliGRAM(s) Oral every 6 hours PRN Temp greater or equal to 38C (100.4F), Mild Pain (1 - 3)    Allergies  No Known Allergies    Intolerances      REVIEW OF SYSTEMS	    ROS:unable to assess.     Vital Signs Last 24 Hrs  T(C): 36.7 (14 Dec 2021 20:16), Max: 36.7 (14 Dec 2021 20:16)  T(F): 98.1 (14 Dec 2021 20:16), Max: 98.1 (14 Dec 2021 20:16)  HR: 70 (15 Dec 2021 04:52) (43 - 70)  BP: 180/86 (15 Dec 2021 04:52) (169/77 - 189/76)  BP(mean): --  RR: 18 (15 Dec 2021 04:52) (17 - 18)  SpO2: 96% (15 Dec 2021 06:43) (95% - 99%)    GENERAL EXAM:  Constitutional: awake and alert.  HEENT: PERRL, EOMI  Neck: Supple  Extremities: no edema, no cyanosis  Musculoskeletal: no joint swelling/tenderness, no abnormal movements  Skin: no rashes    NEUROLOGICAL EXAM:  MS: Awake to verbal response to Name. Patient follows simple commands.   CN: EOMI, PERRL.   V1-3 intact, no facial asymmetry,   Motor: Strength: moves all extremities spontaneously. Able to localize pain and able to grab bipap mask b/l.   Drift in all extremities   Tone: normal. Bulk: normal.   Plantar flex b/l.   Sensation: intact to noxious stimuli.   Coordination: Deferred  Gait: Deferred    NIHSS 7  mRS 0    Labs:   cbc                      10.7   7.19  )-----------( 134      ( 15 Dec 2021 05:51 )             33.6     Chem12-15    147<H>  |  104  |  14  ----------------------------<  117<H>  3.5   |  31  |  0.52    Ca    8.8      15 Dec 2021 05:51  Phos  4.1     12-14  Mg     2.3     12-14    TPro  6.7  /  Alb  4.0  /  TBili  0.4  /  DBili  x   /  AST  133<H>  /  ALT  84<H>  /  AlkPhos  114  12-14      LFTsLIVER FUNCTIONS - ( 14 Dec 2021 21:13 )  Alb: 4.0 g/dL / Pro: 6.7 g/dL / ALK PHOS: 114 U/L / ALT: 84 U/L / AST: 133 U/L / GGT: x           UAUrinalysis Basic - ( 15 Dec 2021 03:07 )    Color: Yellow / Appearance: Clear / S.026 / pH: x  Gluc: x / Ketone: Negative  / Bili: Negative / Urobili: 3 mg/dL   Blood: x / Protein: 100 mg/dL / Nitrite: Positive   Leuk Esterase: Moderate / RBC: 3 /hpf / WBC 68 /HPF   Sq Epi: x / Non Sq Epi: 0 /hpf / Bacteria: Many    Radiology:  CT head w/o contrast: NTERPRETATION:  no interval change when compared with CT head 2021.

## 2021-12-15 NOTE — CONSULT NOTE ADULT - ASSESSMENT
85 y/o F with PMH of acromegaly, HTN, breast CA s/p L mastectomy. Presents to ED with c/o fatigue and bradycardia. Found to have +UA, started on abx. 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 MICU consulted for cypercapnia with decreased mentation. Repeat ABG with ongoing hypercapnia but stable pH    Recommendations  - at this time, we re-adjusted the AVAPS as there was leak  - she should continue to receive a fair trial of AVAPS on the floor, especially as the son at bedside would like to defer intubation at this time  - recommend aggressive goals of care  - POCUS: limited anterior views due to presence of breast implant, but subcostal view with adequate LV function, IVC is plethoric, b-lines anteriorly, and small right pleural effusion  - recommend official TTE and cardiology consult for relative bradycardia  - please repeat ABG in 2 hour  - neuro checks  - MICU to re-assess, but overall, she is hemodynamically stable, continues to be arousable with a GCS of 11 ([id number]), and do feel she needs to be emergently intubated  - consider holding memantine as this can rarely cause bradycardia

## 2021-12-15 NOTE — RAPID RESPONSE TEAM SUMMARY - NSMEDICATIONSRRT_GEN_ALL_CORE
send Bcx, procalcitonin, prolactin, CBC, CMP, gave zosyn. Started AVAPS, will repeat ABG after 1 hour send Bcx, procalcitonin, prolactin, CBC, CMP, gave zosyn. Started AVAPS, will repeat ABG after 1 hour. Patient mental status appeared to improve with AVAPS, more responsive.

## 2021-12-15 NOTE — RAPID RESPONSE TEAM SUMMARY - NSSITUATIONBACKGROUNDRRT_GEN_ALL_CORE
85yo lady with PMH of acromegaly, htn, breast CA s/p L mastectomy who presents to the ED with complaint of fatigue and bradycardia. RRT called for AMS. Patient was AOx2-3, however now reported to be increasingly lethargic, unable to respond to questions. Last known normal and responsive to questions 87yo lady with PMH of acromegaly, htn, breast CA s/p L mastectomy, hx of brain bleed/seizures who presents to the ED with complaint of fatigue and bradycardia. RRT called for AMS. Patient was AOx2-3, however now reported to be increasingly lethargic.

## 2021-12-15 NOTE — CONSULT NOTE ADULT - ASSESSMENT
Bradycardia  due to non perfusive PVCs not a true sinus bradycardia   no evidence of acute MI   obtain echo     cant rule out CHF  appears fluid overloaded on xray   check ProBNP  will likely need lasix IV     AMS  likely due to hypercapnic resp failure   neuro eval   plan for urgent head CT     HTN  will order PRN hydralazine IV for now     acute hypercapnic respiratory failure   bipap  pulm eval   ? steroids     Advanced care planning was discussed with patient and family.  Risks, benefits and alternatives of the cardiac treatments and medical therapy including procedures were discussed in detail and all questions were answered. Importance of compliance with medical therapy and lifestyle modification to improve cardiovascular health were addressed. Appropriate forms and patient educational materials were reviewed. 30 minutes face to face spent.    discussed with son at bedside

## 2021-12-15 NOTE — RAPID RESPONSE TEAM SUMMARY - NSDATETIMERRT_GEN_ALL_CORE
15-Dec-2021 06:52
ORTHOPEDIC PA PROGRESS NOTE  BALDEMAR RASCON      72y Female                                                                                                                               POD #    4 STAGED    STATUS POST:               Pre-Op Dx: Other secondary osteoarthritis of right knee  Primary osteoarthritis of right knee    Post-Op Dx:  Primary osteoarthritis of right knee    Procedure: Arthroplasty of right knee                                                Pain (0-10):  Pt reports 3  Current Pain Management:  [ ] PCA   [x ] Po Analgesics [ ] IM /IV Anagesics     T(F): 97.7  HR: 79  BP: 110/69  RR: 18  SpO2: 97%  Wt(kg): --                         9.8    8.4   )-----------( 228      ( 18 Jun 2017 06:10 )             27.4               Physical Exam :    -   Dressing changed sterile.   -   Wound C/D/I.   -   Distal Neurvascular status intact grossly.   -   Warm well perfused; capillary refill <3 seconds   -   (+)EHL/FHL   -   (+) Sensation to light touch  -   (-) Calf tenderness Bilaterally    A/P: 72y Female s/p Arthroplasty of right knee     -   Ortho Stable  -   Pain control   -   Medicine to follow  -   DVT ppx:     [x ]SCDs     [ ] ASA     [ ] Eliquis     [ ] Lovenox  -   Weight bearing status:  WBAT [x ]        PWB    [ ]     TTWB  [ ]      NWB  [ ]   -  Dispo:     Home [ ]     Acute Rehab [ ]     MATTHEW [ ]     TBD [x ]
15-Dec-2021 14:43

## 2021-12-15 NOTE — CONSULT NOTE ADULT - ASSESSMENT
Patient is a  85yo Rt handed F with  PMH of acromegaly, htn, breast CA s/p L mastectomy, ICH 6 months ago in [place] who presents [facility]  with complaint of fatigue and bradycardia. Per chart review, so provided history and mentioned that patient has had progressive decline in activity over the last few weeks. Patient has had gradual fatigues and has been sleeping more with less appetite and ambulating slower. She has intermittent confusion about date. Neurology consulted for AMS as part RRT. Patient was seen at bedside with son. Per RRT team, patient was noted to have mental status change around 0400am. Patient's son at bedside mentioned he last spoke to his mother at 2145 which is patient's  LWK. Patient was found to hypercarbic in the CO2 79. Patient had generalized weakness with no focality noted. Patient has UTI and questionable history seizures. Patient is a  85yo Rt handed F with  PMH of acromegaly, htn, breast CA s/p L mastectomy, ICH 6 months ago in [place] who presents [facility]  with complaint of fatigue and bradycardia. Per chart review, so provided history and mentioned that patient has had progressive decline in activity over the last few weeks. Patient has had gradual fatigues and has been sleeping more with less appetite and ambulating slower. She has intermittent confusion about date. Neurology consulted for AMS as part RRT. Patient was seen at bedside with son. Per RRT team, patient was noted to have mental status change around 0400am. Patient's son at bedside mentioned he last spoke to his mother at 2145 which is patient's  LWK. Patient was found to hypercarbic in the CO2 79. Patient had generalized weakness with no focality noted. Patient has UTI and questionable history seizures.      Altered Mental status 2/2 to possible toxic/metabolic etiology given hypercarbia and UTI with known episodes of bradycardia with worsening fatigue.   Recommendation:   rEEG   Brain MRI w/o contrast   correction of electrolyte abnormalities   U/a positive- started on Antibiotics  Continue home AEDs Keppra 500mg Q12hr   Continue home medication memantine   Hypercarbia on bipap   Folate, b12, tsh, hgb a1c, MMA, iron levels   rest of management per primary team       Case to be seen with Neurology Attending, Dr. Bland.

## 2021-12-16 LAB
A1C WITH ESTIMATED AVERAGE GLUCOSE RESULT: 5.4 % — SIGNIFICANT CHANGE UP (ref 4–5.6)
ALBUMIN SERPL ELPH-MCNC: 3.8 G/DL — SIGNIFICANT CHANGE UP (ref 3.3–5)
ALP SERPL-CCNC: 100 U/L — SIGNIFICANT CHANGE UP (ref 40–120)
ALT FLD-CCNC: 69 U/L — HIGH (ref 10–45)
ANION GAP SERPL CALC-SCNC: 12 MMOL/L — SIGNIFICANT CHANGE UP (ref 5–17)
APTT BLD: 32.8 SEC — SIGNIFICANT CHANGE UP (ref 27.5–35.5)
APTT BLD: 33.7 SEC — SIGNIFICANT CHANGE UP (ref 27.5–35.5)
AST SERPL-CCNC: 47 U/L — HIGH (ref 10–40)
BASOPHILS # BLD AUTO: 0.03 K/UL — SIGNIFICANT CHANGE UP (ref 0–0.2)
BASOPHILS # BLD AUTO: 0.03 K/UL — SIGNIFICANT CHANGE UP (ref 0–0.2)
BASOPHILS NFR BLD AUTO: 0.5 % — SIGNIFICANT CHANGE UP (ref 0–2)
BASOPHILS NFR BLD AUTO: 0.5 % — SIGNIFICANT CHANGE UP (ref 0–2)
BILIRUB SERPL-MCNC: 0.6 MG/DL — SIGNIFICANT CHANGE UP (ref 0.2–1.2)
BUN SERPL-MCNC: 11 MG/DL — SIGNIFICANT CHANGE UP (ref 7–23)
CALCIUM SERPL-MCNC: 8.6 MG/DL — SIGNIFICANT CHANGE UP (ref 8.4–10.5)
CHLORIDE SERPL-SCNC: 100 MMOL/L — SIGNIFICANT CHANGE UP (ref 96–108)
CO2 SERPL-SCNC: 33 MMOL/L — HIGH (ref 22–31)
CREAT SERPL-MCNC: 0.53 MG/DL — SIGNIFICANT CHANGE UP (ref 0.5–1.3)
EOSINOPHIL # BLD AUTO: 0.04 K/UL — SIGNIFICANT CHANGE UP (ref 0–0.5)
EOSINOPHIL # BLD AUTO: 0.12 K/UL — SIGNIFICANT CHANGE UP (ref 0–0.5)
EOSINOPHIL NFR BLD AUTO: 0.7 % — SIGNIFICANT CHANGE UP (ref 0–6)
EOSINOPHIL NFR BLD AUTO: 1.9 % — SIGNIFICANT CHANGE UP (ref 0–6)
ESTIMATED AVERAGE GLUCOSE: 108 MG/DL — SIGNIFICANT CHANGE UP (ref 68–114)
FERRITIN SERPL-MCNC: 59 NG/ML — SIGNIFICANT CHANGE UP (ref 15–150)
FOLATE SERPL-MCNC: 9.5 NG/ML — SIGNIFICANT CHANGE UP
GAS PNL BLDA: SIGNIFICANT CHANGE UP
GLUCOSE BLDC GLUCOMTR-MCNC: 75 MG/DL — SIGNIFICANT CHANGE UP (ref 70–99)
GLUCOSE BLDC GLUCOMTR-MCNC: 75 MG/DL — SIGNIFICANT CHANGE UP (ref 70–99)
GLUCOSE BLDC GLUCOMTR-MCNC: 81 MG/DL — SIGNIFICANT CHANGE UP (ref 70–99)
GLUCOSE SERPL-MCNC: 96 MG/DL — SIGNIFICANT CHANGE UP (ref 70–99)
HCT VFR BLD CALC: 31.7 % — LOW (ref 34.5–45)
HCT VFR BLD CALC: 33.3 % — LOW (ref 34.5–45)
HGB BLD-MCNC: 10 G/DL — LOW (ref 11.5–15.5)
HGB BLD-MCNC: 10.7 G/DL — LOW (ref 11.5–15.5)
IMM GRANULOCYTES NFR BLD AUTO: 0.2 % — SIGNIFICANT CHANGE UP (ref 0–1.5)
IMM GRANULOCYTES NFR BLD AUTO: 0.3 % — SIGNIFICANT CHANGE UP (ref 0–1.5)
INR BLD: 1.12 RATIO — SIGNIFICANT CHANGE UP (ref 0.88–1.16)
INR BLD: 1.2 RATIO — HIGH (ref 0.88–1.16)
IRON SATN MFR SERPL: 24 UG/DL — LOW (ref 30–160)
IRON SATN MFR SERPL: 9 % — LOW (ref 14–50)
LYMPHOCYTES # BLD AUTO: 0.67 K/UL — LOW (ref 1–3.3)
LYMPHOCYTES # BLD AUTO: 0.74 K/UL — LOW (ref 1–3.3)
LYMPHOCYTES # BLD AUTO: 10.3 % — LOW (ref 13–44)
LYMPHOCYTES # BLD AUTO: 12.4 % — LOW (ref 13–44)
MAGNESIUM SERPL-MCNC: 2 MG/DL — SIGNIFICANT CHANGE UP (ref 1.6–2.6)
MCHC RBC-ENTMCNC: 24 PG — LOW (ref 27–34)
MCHC RBC-ENTMCNC: 24.1 PG — LOW (ref 27–34)
MCHC RBC-ENTMCNC: 31.5 GM/DL — LOW (ref 32–36)
MCHC RBC-ENTMCNC: 32.1 GM/DL — SIGNIFICANT CHANGE UP (ref 32–36)
MCV RBC AUTO: 74.8 FL — LOW (ref 80–100)
MCV RBC AUTO: 76.4 FL — LOW (ref 80–100)
MONOCYTES # BLD AUTO: 0.52 K/UL — SIGNIFICANT CHANGE UP (ref 0–0.9)
MONOCYTES # BLD AUTO: 0.6 K/UL — SIGNIFICANT CHANGE UP (ref 0–0.9)
MONOCYTES NFR BLD AUTO: 10 % — SIGNIFICANT CHANGE UP (ref 2–14)
MONOCYTES NFR BLD AUTO: 8 % — SIGNIFICANT CHANGE UP (ref 2–14)
NEUTROPHILS # BLD AUTO: 4.56 K/UL — SIGNIFICANT CHANGE UP (ref 1.8–7.4)
NEUTROPHILS # BLD AUTO: 5.12 K/UL — SIGNIFICANT CHANGE UP (ref 1.8–7.4)
NEUTROPHILS NFR BLD AUTO: 76.2 % — SIGNIFICANT CHANGE UP (ref 43–77)
NEUTROPHILS NFR BLD AUTO: 79 % — HIGH (ref 43–77)
NRBC # BLD: 0 /100 WBCS — SIGNIFICANT CHANGE UP (ref 0–0)
NRBC # BLD: 0 /100 WBCS — SIGNIFICANT CHANGE UP (ref 0–0)
PHOSPHATE SERPL-MCNC: 3.6 MG/DL — SIGNIFICANT CHANGE UP (ref 2.5–4.5)
PLATELET # BLD AUTO: 134 K/UL — LOW (ref 150–400)
PLATELET # BLD AUTO: 140 K/UL — LOW (ref 150–400)
POTASSIUM SERPL-MCNC: 3.4 MMOL/L — LOW (ref 3.5–5.3)
POTASSIUM SERPL-SCNC: 3.4 MMOL/L — LOW (ref 3.5–5.3)
PROT SERPL-MCNC: 6 G/DL — SIGNIFICANT CHANGE UP (ref 6–8.3)
PROTHROM AB SERPL-ACNC: 13.4 SEC — SIGNIFICANT CHANGE UP (ref 10.6–13.6)
PROTHROM AB SERPL-ACNC: 14.3 SEC — HIGH (ref 10.6–13.6)
RBC # BLD: 4.15 M/UL — SIGNIFICANT CHANGE UP (ref 3.8–5.2)
RBC # BLD: 4.45 M/UL — SIGNIFICANT CHANGE UP (ref 3.8–5.2)
RBC # FLD: 19.2 % — HIGH (ref 10.3–14.5)
RBC # FLD: 19.5 % — HIGH (ref 10.3–14.5)
SODIUM SERPL-SCNC: 145 MMOL/L — SIGNIFICANT CHANGE UP (ref 135–145)
T3FREE SERPL-MCNC: 1.61 PG/ML — LOW (ref 1.8–4.6)
T4 FREE SERPL-MCNC: 1 NG/DL — SIGNIFICANT CHANGE UP (ref 0.9–1.8)
TIBC SERPL-MCNC: 257 UG/DL — SIGNIFICANT CHANGE UP (ref 220–430)
TSH SERPL-MCNC: 0.2 UIU/ML — LOW (ref 0.27–4.2)
UIBC SERPL-MCNC: 233 UG/DL — SIGNIFICANT CHANGE UP (ref 110–370)
VIT B12 SERPL-MCNC: 486 PG/ML — SIGNIFICANT CHANGE UP (ref 232–1245)
WBC # BLD: 5.98 K/UL — SIGNIFICANT CHANGE UP (ref 3.8–10.5)
WBC # BLD: 6.48 K/UL — SIGNIFICANT CHANGE UP (ref 3.8–10.5)
WBC # FLD AUTO: 5.98 K/UL — SIGNIFICANT CHANGE UP (ref 3.8–10.5)
WBC # FLD AUTO: 6.48 K/UL — SIGNIFICANT CHANGE UP (ref 3.8–10.5)

## 2021-12-16 PROCEDURE — 71045 X-RAY EXAM CHEST 1 VIEW: CPT | Mod: 26

## 2021-12-16 PROCEDURE — 99291 CRITICAL CARE FIRST HOUR: CPT

## 2021-12-16 RX ORDER — HYDRALAZINE HCL 50 MG
10 TABLET ORAL EVERY 8 HOURS
Refills: 0 | Status: DISCONTINUED | OUTPATIENT
Start: 2021-12-16 | End: 2021-12-16

## 2021-12-16 RX ORDER — HYDRALAZINE HCL 50 MG
25 TABLET ORAL EVERY 8 HOURS
Refills: 0 | Status: DISCONTINUED | OUTPATIENT
Start: 2021-12-16 | End: 2021-12-27

## 2021-12-16 RX ORDER — POLYETHYLENE GLYCOL 3350 17 G/17G
17 POWDER, FOR SOLUTION ORAL DAILY
Refills: 0 | Status: DISCONTINUED | OUTPATIENT
Start: 2021-12-16 | End: 2021-12-27

## 2021-12-16 RX ORDER — IPRATROPIUM/ALBUTEROL SULFATE 18-103MCG
3 AEROSOL WITH ADAPTER (GRAM) INHALATION EVERY 6 HOURS
Refills: 0 | Status: DISCONTINUED | OUTPATIENT
Start: 2021-12-16 | End: 2021-12-23

## 2021-12-16 RX ORDER — POTASSIUM CHLORIDE 20 MEQ
10 PACKET (EA) ORAL
Refills: 0 | Status: COMPLETED | OUTPATIENT
Start: 2021-12-16 | End: 2021-12-16

## 2021-12-16 RX ORDER — BUDESONIDE, MICRONIZED 100 %
0.5 POWDER (GRAM) MISCELLANEOUS
Refills: 0 | Status: DISCONTINUED | OUTPATIENT
Start: 2021-12-16 | End: 2021-12-21

## 2021-12-16 RX ORDER — HYDRALAZINE HCL 50 MG
2.5 TABLET ORAL ONCE
Refills: 0 | Status: COMPLETED | OUTPATIENT
Start: 2021-12-16 | End: 2021-12-16

## 2021-12-16 RX ADMIN — PIPERACILLIN AND TAZOBACTAM 25 GRAM(S): 4; .5 INJECTION, POWDER, LYOPHILIZED, FOR SOLUTION INTRAVENOUS at 13:01

## 2021-12-16 RX ADMIN — Medication 100 MILLIEQUIVALENT(S): at 02:22

## 2021-12-16 RX ADMIN — PIPERACILLIN AND TAZOBACTAM 25 GRAM(S): 4; .5 INJECTION, POWDER, LYOPHILIZED, FOR SOLUTION INTRAVENOUS at 05:02

## 2021-12-16 RX ADMIN — LEVETIRACETAM 400 MILLIGRAM(S): 250 TABLET, FILM COATED ORAL at 05:02

## 2021-12-16 RX ADMIN — Medication 100 MILLIEQUIVALENT(S): at 01:19

## 2021-12-16 RX ADMIN — Medication 3 MILLILITER(S): at 17:16

## 2021-12-16 RX ADMIN — CHLORHEXIDINE GLUCONATE 1 APPLICATION(S): 213 SOLUTION TOPICAL at 05:02

## 2021-12-16 RX ADMIN — Medication 25 MILLIGRAM(S): at 21:27

## 2021-12-16 RX ADMIN — LEVETIRACETAM 400 MILLIGRAM(S): 250 TABLET, FILM COATED ORAL at 17:42

## 2021-12-16 RX ADMIN — Medication 0.5 MILLIGRAM(S): at 13:04

## 2021-12-16 RX ADMIN — PIPERACILLIN AND TAZOBACTAM 25 GRAM(S): 4; .5 INJECTION, POWDER, LYOPHILIZED, FOR SOLUTION INTRAVENOUS at 21:27

## 2021-12-16 RX ADMIN — Medication 2.5 MILLIGRAM(S): at 06:35

## 2021-12-16 RX ADMIN — Medication 25 MILLIGRAM(S): at 15:06

## 2021-12-16 RX ADMIN — ENOXAPARIN SODIUM 40 MILLIGRAM(S): 100 INJECTION SUBCUTANEOUS at 11:18

## 2021-12-16 RX ADMIN — Medication 3 MILLILITER(S): at 23:29

## 2021-12-16 NOTE — PROGRESS NOTE ADULT - SUBJECTIVE AND OBJECTIVE BOX
Santhosh Lopes MD  Internal Medicine Resident  [phone]    PATIENT:  TONIA MAJANO  [id number]    CHIEF COMPLAINT:  Patient is a 86y old  Female who presents with a chief complaint of bradycardia (15 Dec 2021 13:06)      INTERVAL HISTORY/OVERNIGHT EVENTS:  - overnight weaned off AVAPS  - mental status improved  - denies pains, feels comfortable  - feels hungry she states    REVIEW OF SYSTEMS:  unable to obtain full ROS due to delirium/cognitive impairment    ALLERGIES:  Allergies  No Known Allergies    OBJECTIVE:  ICU Vital Signs Last 24 Hrs  T(C): 36.8 (16 Dec 2021 04:00), Max: 37 (15 Dec 2021 10:28)  T(F): 98.2 (16 Dec 2021 04:00), Max: 98.6 (15 Dec 2021 10:28)  HR: 72 (16 Dec 2021 06:36) (44 - 72)  BP: 181/81 (16 Dec 2021 06:36) (124/58 - 190/85)  BP(mean): 116 (16 Dec 2021 06:36) (83 - 124)  RR: 25 (16 Dec 2021 06:36) (13 - 25)  SpO2: 98% (16 Dec 2021 06:36) (95% - 100%)    POCT Blood Glucose.: 75 mg/dL (16 Dec 2021 05:01)  POCT Blood Glucose.: 100 mg/dL (15 Dec 2021 14:44)    CAPILLARY BLOOD GLUCOSE      POCT Blood Glucose.: 75 mg/dL (16 Dec 2021 05:01)    I&O's Summary    15 Dec 2021 07:01  -  16 Dec 2021 07:00  --------------------------------------------------------  IN: 975 mL / OUT: 1000 mL / NET: -25 mL      Daily     Daily Weight in k.7 (15 Dec 2021 20:00)    PHYSICAL EXAMINATION:  GENERAL: NAD, lying in bed comfortably  HEAD:  Atraumatic, Normocephalic  EYES: EOMI, PERRL, conjunctiva and sclera clear  ENT: Moist mucous membranes  NECK: Supple, No JVD  CHEST/LUNG: Clear to auscultation bilaterally; No rales, rhonchi, wheezing, or rubs. Unlabored respirations  HEART: Regular rate and rhythm; No murmurs, rubs, or gallops  ABDOMEN: Bowel sounds present; Soft, Nontender, Nondistended. No hepatomegally  EXTREMITIES:  2+ Peripheral Pulses, brisk capillary refill. No clubbing, cyanosis, or edema  NERVOUS SYSTEM:  Alert & Oriented X1, redirectable, logical phrases, +talkback, MAGANA, follows complex commands, speech clear. No deficits.  MSK: Appropriate strength for level of debility  SKIN: No rashes or lesions    LABS:  ABG - ( 16 Dec 2021 00:25 )  pH, Arterial: 7.32  pH, Blood: x     /  pCO2: 74    /  pO2: 107   / HCO3: 38    / Base Excess: 9.8   /  SaO2: 98.7                                    10.0   5.98  )-----------( 134      ( 16 Dec 2021 00:33 )             31.7     12-16    145  |  100  |  11  ----------------------------<  96  3.4<L>   |  33<H>  |  0.53    Ca    8.6      16 Dec 2021 00:33  Phos  3.6     12-  Mg     2.0     12-    TPro  6.0  /  Alb  3.8  /  TBili  0.6  /  DBili  x   /  AST  47<H>  /  ALT  69<H>  /  AlkPhos  100  12-16    LIVER FUNCTIONS - ( 16 Dec 2021 00:33 )  Alb: 3.8 g/dL / Pro: 6.0 g/dL / ALK PHOS: 100 U/L / ALT: 69 U/L / AST: 47 U/L / GGT: x           PT/INR - ( 16 Dec 2021 00:33 )   PT: 14.3 sec;   INR: 1.20 ratio    PTT - ( 16 Dec 2021 00:33 )  PTT:32.8 sec        Urinalysis Basic - ( 15 Dec 2021 03:07 )    Color: Yellow / Appearance: Clear / S.026 / pH: x  Gluc: x / Ketone: Negative  / Bili: Negative / Urobili: 3 mg/dL   Blood: x / Protein: 100 mg/dL / Nitrite: Positive   Leuk Esterase: Moderate / RBC: 3 /hpf / WBC 68 /HPF   Sq Epi: x / Non Sq Epi: 0 /hpf / Bacteria: Many

## 2021-12-16 NOTE — SWALLOW BEDSIDE ASSESSMENT ADULT - SLP PERTINENT HISTORY OF CURRENT PROBLEM
This patient is an 87yo lady with PMH of acromegaly, htn, breast CA s/p L mastectomy who presents to the ED with complaint of fatigue and bradycardia. History was provided by son at bedside. The patient has had progressive decline in activity over the last few weeks. She has been sleeping more, eating less, and ambulating slower. She has intermittent confusion about dates, however she recognizes family. Son noticed her gait is much slower. She had no fever, chills, or vomiting or nausea. She has chronic urinary incontinence.

## 2021-12-16 NOTE — PROGRESS NOTE ADULT - ASSESSMENT
Hypercapnic resp failure   ? etiology   ? chronic   work up and tx as per ICU    Bradycardia  stable  perhaps related to non perfusive PVCs on pulse ox     HTN  stable

## 2021-12-16 NOTE — PROGRESS NOTE ADULT - ASSESSMENT
Assessment & Plan:   87 y/o F with PMH of acromegaly, HTN, breast CA s/p L mastectomy. Presents to ED with c/o fatigue and bradycardia. Found to have +UA, started on abx. 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 MICU consulted for hypercapnia with decreased mentation. Repeat ABG with ongoing hypercapnia but stable pH. 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 EKG showed sinus hasmukh, no AV block.  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  - pBNP 3515  - Lasix 20 mg 12/15  - holding home Toprol  mg daily for now    #Pulmonary  - ABG during RRT 12/15 7.28, pCO2 78, pO2 126, HCO3 37; VBG later showed pH 7.17, pCO2 85, pO2 38, HCO3 31  - likely has underlying chronic hypercapnia  - brought to MICU for AVAPS, never intubated  - CT chest when able  - pulm follow for hypercapnia when downgraded from MICU    #Renal  - no active issues  - ins outs    #Infectious disease  - many bacteria on UA, + LE, moderate nitrite  - likely has sepsis 2/2 UTI, contributing to current lethargy  - f/u BCx x 2, UCx  - zosyn (12/15 - )    #Hematology/Oncology  - platelets 166 -> 134, thrombocytopenic, will CTM  - maintain active T&S    #Gastrointestinal  - passed dysphagia eval 10/16 -> ordered for diet  - monitor glucose    #Psychiatric  - No active issues    #PPx  DVT: lovenox    #Ethics  Full code  Sánchez is son, (HCP, still needs to bring forms in) [phone]

## 2021-12-16 NOTE — PROGRESS NOTE ADULT - SUBJECTIVE AND OBJECTIVE BOX
Name of Patient : TONIA MAJANO  MRN: [id number]  Date of visit: 21       Subjective: Patient seen and examined. No new events except as noted.   cont ot be lethargic  more awake       MEDICATIONS:  MEDICATIONS  (STANDING):  albuterol/ipratropium for Nebulization 3 milliLiter(s) Nebulizer every 6 hours  buDESOnide    Inhalation Suspension 0.5 milliGRAM(s) Inhalation two times a day  chlorhexidine 4% Liquid 1 Application(s) Topical <User Schedule>  enoxaparin Injectable 40 milliGRAM(s) SubCutaneous daily  hydrALAZINE 25 milliGRAM(s) Oral every 8 hours  levETIRAcetam  IVPB 500 milliGRAM(s) IV Intermittent every 12 hours  piperacillin/tazobactam IVPB.. 3.375 Gram(s) IV Intermittent every 8 hours  polyethylene glycol 3350 17 Gram(s) Oral daily      PHYSICAL EXAM:  T(C): 37.1 (21 @ 19:51), Max: 37.1 (21 @ 19:51)  HR: 70 (21 @ 22:00) (46 - 80)  BP: 142/63 (21 @ 22:00) (122/58 - 191/80)  RR: 21 (21 @ 22:00) (13 - 41)  SpO2: 100% (21 @ 22:00) (89% - 100%)  Wt(kg): --  I&O's Summary    15 Dec 2021 07:  -  16 Dec 2021 07:00  --------------------------------------------------------  IN: 975 mL / OUT: 1000 mL / NET: -25 mL    16 Dec 2021 07:  -  16 Dec 2021 22:58  --------------------------------------------------------  IN: 0 mL / OUT: 800 mL / NET: -800 mL          Appearance: beba MARTINEZ:  DELANEY   Lymphatic: No lymphadenopathy   Cardiovascular: Normal S1 S2, no JVD  Respiratory: normal effort , clear  Gastrointestinal:  Soft, Non-tender  Skin: No rashes,  warm to touch  Psychiatry: lethargic   Musculuskeletal: No edema      All labs, Imaging and EKGs personally reviewed     12-15-21 @ 07:01  -  21 @ 07:00  --------------------------------------------------------  IN: 975 mL / OUT: 1000 mL / NET: -25 mL    21 @ 07:01  -  21 @ 22:58  --------------------------------------------------------  IN: 0 mL / OUT: 800 mL / NET: -800 mL                            10.0   5.98  )-----------( 134      ( 16 Dec 2021 00:33 )             31.7               -    145  |  100  |  11  ----------------------------<  96  3.4<L>   |  33<H>  |  0.53    Ca    8.6      16 Dec 2021 00:33  Phos  3.6       Mg     2.0         TPro  6.0  /  Alb  3.8  /  TBili  0.6  /  DBili  x   /  AST  47<H>  /  ALT  69<H>  /  AlkPhos  100      PT/INR - ( 16 Dec 2021 00:33 )   PT: 14.3 sec;   INR: 1.20 ratio         PTT - ( 16 Dec 2021 00:33 )  PTT:32.8 sec                 ABG - ( 16 Dec 2021 18:05 )  pH, Arterial: 7.29  pH, Blood: x     /  pCO2: 86    /  pO2: 33    / HCO3: 41    / Base Excess: 11.9  /  SaO2: 57.5              Urinalysis Basic - ( 15 Dec 2021 03:07 )    Color: Yellow / Appearance: Clear / S.026 / pH: x  Gluc: x / Ketone: Negative  / Bili: Negative / Urobili: 3 mg/dL   Blood: x / Protein: 100 mg/dL / Nitrite: Positive   Leuk Esterase: Moderate / RBC: 3 /hpf / WBC 68 /HPF   Sq Epi: x / Non Sq Epi: 0 /hpf / Bacteria: Many

## 2021-12-16 NOTE — PROGRESS NOTE ADULT - SUBJECTIVE AND OBJECTIVE BOX
DATE OF SERVICE: 12-16-21 @ 10:53    Subjective: Patient seen and examined. No new events except as noted.     SUBJECTIVE/ROS:  pt still lethargic but more awake , alert , recognized me   she is in ICU       MEDICATIONS:  MEDICATIONS  (STANDING):  chlorhexidine 4% Liquid 1 Application(s) Topical <User Schedule>  enoxaparin Injectable 40 milliGRAM(s) SubCutaneous daily  levETIRAcetam  IVPB 500 milliGRAM(s) IV Intermittent every 12 hours  piperacillin/tazobactam IVPB.. 3.375 Gram(s) IV Intermittent every 8 hours      PHYSICAL EXAM:  T(C): 36.9 (12-16-21 @ 08:00), Max: 36.9 (12-15-21 @ 20:00)  HR: 74 (12-16-21 @ 09:00) (44 - 80)  BP: 145/67 (12-16-21 @ 09:00) (124/58 - 190/85)  RR: 29 (12-16-21 @ 09:00) (13 - 41)  SpO2: 93% (12-16-21 @ 09:00) (93% - 100%)  Wt(kg): --  I&O's Summary    15 Dec 2021 07:01  -  16 Dec 2021 07:00  --------------------------------------------------------  IN: 975 mL / OUT: 1000 mL / NET: -25 mL            JVP: Normal  Neck: supple  Lung: clear   CV: S1 S2 , Murmur:  Abd: soft  Ext: No edema  neuro: Awake / alert  Psych: flat affect  Skin: normal``    LABS/DATA:    CARDIAC MARKERS:      < from: Transthoracic Echocardiogram (12.15.21 @ 10:23) >  Conclusions:  Technically very difficult study. Patient refsued to  complete entire examination. Images limited by prior breast  reconstruction surgery.  1. Mitral valve not well visualized, mitral leaflets appear  to open normally. Grossly mild mitral regurgitation.  2. Aortic valve not well visualized; appears to open  normally. No aortic valve regurgitation seen.  3. Endocardium not well visualized; grossly normal left  ventricular systolic function.  4. The right ventricle is not well visualized.  *** No previous Echo exam.  ------------------------------------------------------------------------  Confirmed on  12/15/2021 - 15:18:59 by Tigist Coffey M.D.  ------------------------------------------------------------------------    < end of copied text >                            10.0   5.98  )-----------( 134      ( 16 Dec 2021 00:33 )             31.7     12-16    145  |  100  |  11  ----------------------------<  96  3.4<L>   |  33<H>  |  0.53    Ca    8.6      16 Dec 2021 00:33  Phos  3.6     12-16  Mg     2.0     12-16    TPro  6.0  /  Alb  3.8  /  TBili  0.6  /  DBili  x   /  AST  47<H>  /  ALT  69<H>  /  AlkPhos  100  12-16    proBNP:   Lipid Profile:   HgA1c:   TSH: Thyroid Stimulating Hormone, Serum: 0.20 uIU/mL (12-16 @ 09:16)      TELE:  EKG:

## 2021-12-17 LAB
ALBUMIN SERPL ELPH-MCNC: 3.6 G/DL — SIGNIFICANT CHANGE UP (ref 3.3–5)
ALP SERPL-CCNC: 91 U/L — SIGNIFICANT CHANGE UP (ref 40–120)
ALT FLD-CCNC: 55 U/L — HIGH (ref 10–45)
ANION GAP SERPL CALC-SCNC: 12 MMOL/L — SIGNIFICANT CHANGE UP (ref 5–17)
AST SERPL-CCNC: 29 U/L — SIGNIFICANT CHANGE UP (ref 10–40)
BASOPHILS # BLD AUTO: 0.02 K/UL — SIGNIFICANT CHANGE UP (ref 0–0.2)
BASOPHILS NFR BLD AUTO: 0.3 % — SIGNIFICANT CHANGE UP (ref 0–2)
BILIRUB SERPL-MCNC: 0.7 MG/DL — SIGNIFICANT CHANGE UP (ref 0.2–1.2)
BUN SERPL-MCNC: 13 MG/DL — SIGNIFICANT CHANGE UP (ref 7–23)
CALCIUM SERPL-MCNC: 9.3 MG/DL — SIGNIFICANT CHANGE UP (ref 8.4–10.5)
CHLORIDE SERPL-SCNC: 100 MMOL/L — SIGNIFICANT CHANGE UP (ref 96–108)
CO2 SERPL-SCNC: 31 MMOL/L — SIGNIFICANT CHANGE UP (ref 22–31)
CREAT SERPL-MCNC: 0.45 MG/DL — LOW (ref 0.5–1.3)
EOSINOPHIL # BLD AUTO: 0.09 K/UL — SIGNIFICANT CHANGE UP (ref 0–0.5)
EOSINOPHIL NFR BLD AUTO: 1.4 % — SIGNIFICANT CHANGE UP (ref 0–6)
GAS PNL BLDA: SIGNIFICANT CHANGE UP
GAS PNL BLDA: SIGNIFICANT CHANGE UP
GLUCOSE SERPL-MCNC: 93 MG/DL — SIGNIFICANT CHANGE UP (ref 70–99)
HCT VFR BLD CALC: 32.2 % — LOW (ref 34.5–45)
HGB BLD-MCNC: 10.3 G/DL — LOW (ref 11.5–15.5)
IMM GRANULOCYTES NFR BLD AUTO: 0.3 % — SIGNIFICANT CHANGE UP (ref 0–1.5)
LYMPHOCYTES # BLD AUTO: 0.73 K/UL — LOW (ref 1–3.3)
LYMPHOCYTES # BLD AUTO: 11.4 % — LOW (ref 13–44)
MAGNESIUM SERPL-MCNC: 2 MG/DL — SIGNIFICANT CHANGE UP (ref 1.6–2.6)
MCHC RBC-ENTMCNC: 24 PG — LOW (ref 27–34)
MCHC RBC-ENTMCNC: 32 GM/DL — SIGNIFICANT CHANGE UP (ref 32–36)
MCV RBC AUTO: 75.1 FL — LOW (ref 80–100)
MONOCYTES # BLD AUTO: 0.63 K/UL — SIGNIFICANT CHANGE UP (ref 0–0.9)
MONOCYTES NFR BLD AUTO: 9.9 % — SIGNIFICANT CHANGE UP (ref 2–14)
NEUTROPHILS # BLD AUTO: 4.89 K/UL — SIGNIFICANT CHANGE UP (ref 1.8–7.4)
NEUTROPHILS NFR BLD AUTO: 76.7 % — SIGNIFICANT CHANGE UP (ref 43–77)
NRBC # BLD: 0 /100 WBCS — SIGNIFICANT CHANGE UP (ref 0–0)
PHOSPHATE SERPL-MCNC: 2.5 MG/DL — SIGNIFICANT CHANGE UP (ref 2.5–4.5)
PLATELET # BLD AUTO: 133 K/UL — LOW (ref 150–400)
POTASSIUM SERPL-MCNC: 3.3 MMOL/L — LOW (ref 3.5–5.3)
POTASSIUM SERPL-SCNC: 3.3 MMOL/L — LOW (ref 3.5–5.3)
PROT SERPL-MCNC: 6.3 G/DL — SIGNIFICANT CHANGE UP (ref 6–8.3)
RBC # BLD: 4.29 M/UL — SIGNIFICANT CHANGE UP (ref 3.8–5.2)
RBC # FLD: 19.2 % — HIGH (ref 10.3–14.5)
SODIUM SERPL-SCNC: 143 MMOL/L — SIGNIFICANT CHANGE UP (ref 135–145)
WBC # BLD: 6.38 K/UL — SIGNIFICANT CHANGE UP (ref 3.8–10.5)
WBC # FLD AUTO: 6.38 K/UL — SIGNIFICANT CHANGE UP (ref 3.8–10.5)

## 2021-12-17 PROCEDURE — 99291 CRITICAL CARE FIRST HOUR: CPT

## 2021-12-17 PROCEDURE — 93306 TTE W/DOPPLER COMPLETE: CPT | Mod: 26

## 2021-12-17 RX ORDER — POTASSIUM CHLORIDE 20 MEQ
10 PACKET (EA) ORAL
Refills: 0 | Status: COMPLETED | OUTPATIENT
Start: 2021-12-17 | End: 2021-12-17

## 2021-12-17 RX ADMIN — POLYETHYLENE GLYCOL 3350 17 GRAM(S): 17 POWDER, FOR SOLUTION ORAL at 13:31

## 2021-12-17 RX ADMIN — Medication 3 MILLILITER(S): at 18:04

## 2021-12-17 RX ADMIN — Medication 100 MILLIEQUIVALENT(S): at 02:10

## 2021-12-17 RX ADMIN — Medication 0.5 MILLIGRAM(S): at 06:03

## 2021-12-17 RX ADMIN — PIPERACILLIN AND TAZOBACTAM 25 GRAM(S): 4; .5 INJECTION, POWDER, LYOPHILIZED, FOR SOLUTION INTRAVENOUS at 13:31

## 2021-12-17 RX ADMIN — ENOXAPARIN SODIUM 40 MILLIGRAM(S): 100 INJECTION SUBCUTANEOUS at 13:31

## 2021-12-17 RX ADMIN — Medication 0.5 MILLIGRAM(S): at 18:04

## 2021-12-17 RX ADMIN — CHLORHEXIDINE GLUCONATE 1 APPLICATION(S): 213 SOLUTION TOPICAL at 06:04

## 2021-12-17 RX ADMIN — LEVETIRACETAM 400 MILLIGRAM(S): 250 TABLET, FILM COATED ORAL at 18:12

## 2021-12-17 RX ADMIN — Medication 25 MILLIGRAM(S): at 06:04

## 2021-12-17 RX ADMIN — Medication 3 MILLILITER(S): at 05:50

## 2021-12-17 RX ADMIN — LEVETIRACETAM 400 MILLIGRAM(S): 250 TABLET, FILM COATED ORAL at 06:03

## 2021-12-17 RX ADMIN — Medication 100 MILLIEQUIVALENT(S): at 04:15

## 2021-12-17 RX ADMIN — Medication 3 MILLILITER(S): at 11:27

## 2021-12-17 RX ADMIN — PIPERACILLIN AND TAZOBACTAM 25 GRAM(S): 4; .5 INJECTION, POWDER, LYOPHILIZED, FOR SOLUTION INTRAVENOUS at 06:04

## 2021-12-17 RX ADMIN — PIPERACILLIN AND TAZOBACTAM 25 GRAM(S): 4; .5 INJECTION, POWDER, LYOPHILIZED, FOR SOLUTION INTRAVENOUS at 21:25

## 2021-12-17 RX ADMIN — Medication 25 MILLIGRAM(S): at 13:31

## 2021-12-17 RX ADMIN — Medication 25 MILLIGRAM(S): at 21:26

## 2021-12-17 RX ADMIN — Medication 100 MILLIEQUIVALENT(S): at 03:10

## 2021-12-17 NOTE — PHYSICAL THERAPY INITIAL EVALUATION ADULT - ADDITIONAL COMMENTS
Patient lives in apartment, son staying with her, no stairs to enter, +elevator. DME: Rolling walker, shower chair

## 2021-12-17 NOTE — SWALLOW BEDSIDE ASSESSMENT ADULT - ASR SWALLOW ASPIRATION MONITOR
*If evident, report to MD immediately and d/c oral diet or if Pt evidences decline in respiratory status (ie requiring Bipap support) suggest d/c oral diet./change of breathing pattern/cough/gurgly voice/fever/pneumonia/throat clearing/upper respiratory infection

## 2021-12-17 NOTE — DIETITIAN INITIAL EVALUATION ADULT. - OTHER INFO
Pt currently prescribed EN feeds of Vital AF at 40ml/hr x 18 hrs (720ml, 864kcal and 54g protein). Held at time of RD visit. Per nursing flow sheet, pt rate has only reached 10ml/hr 2/2 BiPAP. Pt s/p swallow evaluation 12/16, however deferred. Prior to RRT, pt had been prescribed a DASH/TLC diet.     Dosing wt (12/14): 160.1 lbs.   [facility] ([facility] 180 lbs. Indicates ~20 lbs/11% wt loss x ~11 months.     Skin: no pressure injuries noted  Edema: none noted  GI: no documented BM's recorded thus far. On bowel regimen (Miralax).

## 2021-12-17 NOTE — DIETITIAN NUTRITION RISK NOTIFICATION - TREATMENT: THE FOLLOWING DIET HAS BEEN RECOMMENDED
Diet, NPO with Tube Feed:   Tube Feeding Modality: Nasogastric  Vital AF (VITALAFRTH)  Total Volume for 24 Hours (mL): 720  Intermittent  Starting Tube Feed Rate {mL per Hour}: 10  Increase Tube Feed Rate by (mL): 10    Every 4 hours  Until Goal Tube Feed Rate (mL per Hour): 40  Tube Feeding Hours ON: 18  Tube Feeding OFF (Hours): 6  Tube Feed Start Time: 11:00 (12-16-21 @ 17:50) [Active]

## 2021-12-17 NOTE — PROGRESS NOTE ADULT - ASSESSMENT
Assessment & Plan:   85 y/o F with PMH of acromegaly, HTN, breast CA s/p L mastectomy. Presents to ED with c/o fatigue and bradycardia. Found to have +UA, started on abx. 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 MICU consulted for hypercapnia with decreased mentation. Repeat ABG with ongoing hypercapnia but stable pH. 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 EKG showed sinus hasmukh, no AV block.  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  - pBNP 3515  - Lasix 20 mg 12/15  - holding home Toprol  mg daily for now    #Pulmonary  - ABG during RRT 12/15 7.28, pCO2 78, pO2 126, HCO3 37; VBG later showed pH 7.17, pCO2 85, pO2 38, HCO3 31  - likely has underlying chronic hypercapnia  - brought to MICU for AVAPS, never intubated  - CT chest when able  - pulm follow for hypercapnia when downgraded from MICU    #Renal  - no active issues  - ins outs    #Infectious disease  - many bacteria on UA, + LE, moderate nitrite  - likely has sepsis 2/2 UTI, contributing to current lethargy  - f/u BCx x 2, UCx  - zosyn (12/15 - )    #Hematology/Oncology  - platelets 166 -> 134, thrombocytopenic, will CTM  - maintain active T&S    #Gastrointestinal  - passed dysphagia eval 10/16 -> ordered for diet  - monitor glucose    #Psychiatric  - No active issues    #PPx  DVT: lovenox    #Ethics  Full code  Sánchez is son, (HCP, still needs to bring forms in) [phone]   Assessment & Plan:   85 y/o F with PMH of acromegaly, HTN, breast CA s/p L mastectomy. Presents to ED with c/o fatigue and bradycardia. Found to have +UA, started on abx. 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 MICU consulted for hypercapnia with decreased mentation. Repeat ABG with ongoing hypercapnia but stable pH. 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 EKG showed sinus hasmukh, no AV block.  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  - pBNP 3515  - Lasix 20 mg 12/15  - holding home Toprol  mg daily for now    #Pulmonary  - ABG during RRT 12/15 7.28, pCO2 78, pO2 126, HCO3 37; VBG later showed pH 7.17, pCO2 85, pO2 38, HCO3 31  - likely has underlying chronic hypercapnia  - brought to MICU for AVAPS, never intubated - plan for likely AVAPS qHS and PRN  - CT chest when able  - pulm follow for hypercapnia when downgraded from MICU  - 12/17 doing better off AVAPS during the day    #Renal  - no active issues  - ins outs    #Infectious disease  - many bacteria on UA, + LE, moderate nitrite  - likely has sepsis 2/2 UTI, contributing to current lethargy  - f/u BCx x 2, UCx  - zosyn (12/15 - )    #Hematology/Oncology  - platelets 166 -> 134, thrombocytopenic, will CTM  - maintain active T&S    #Gastrointestinal  - passed dysphagia eval 10/16 -> ordered for diet  - monitor glucose    #Psychiatric  - No active issues    #PPx  DVT: lovenox    #Ethics  Full code  Sánchez is son, (HCP, still needs to bring forms in) [phone]

## 2021-12-17 NOTE — PROGRESS NOTE ADULT - SUBJECTIVE AND OBJECTIVE BOX
DATE OF SERVICE: 12-17-21 @ 12:55    Subjective: Patient seen and examined. No new events except as noted.     SUBJECTIVE/ROS:  Pt seen and examined early this am  on bipap   awake, alert     MEDICATIONS:  MEDICATIONS  (STANDING):  albuterol/ipratropium for Nebulization 3 milliLiter(s) Nebulizer every 6 hours  buDESOnide    Inhalation Suspension 0.5 milliGRAM(s) Inhalation two times a day  chlorhexidine 4% Liquid 1 Application(s) Topical <User Schedule>  enoxaparin Injectable 40 milliGRAM(s) SubCutaneous daily  hydrALAZINE 25 milliGRAM(s) Oral every 8 hours  levETIRAcetam  IVPB 500 milliGRAM(s) IV Intermittent every 12 hours  piperacillin/tazobactam IVPB.. 3.375 Gram(s) IV Intermittent every 8 hours  polyethylene glycol 3350 17 Gram(s) Oral daily      PHYSICAL EXAM:  T(C): 37.1 (12-17-21 @ 08:00), Max: 37.1 (12-16-21 @ 19:51)  HR: 66 (12-17-21 @ 12:03) (53 - 79)  BP: 150/67 (12-17-21 @ 09:00) (122/58 - 197/85)  RR: 24 (12-17-21 @ 09:00) (14 - 26)  SpO2: 96% (12-17-21 @ 12:03) (95% - 100%)  Wt(kg): --  I&O's Summary    16 Dec 2021 07:01  -  17 Dec 2021 07:00  --------------------------------------------------------  IN: 100 mL / OUT: 800 mL / NET: -700 mL    17 Dec 2021 07:01  -  17 Dec 2021 12:55  --------------------------------------------------------  IN: 100 mL / OUT: 250 mL / NET: -150 mL            JVP: Normal  Neck: supple  Lung: clear   CV: S1 S2 , Murmur:  Abd: soft  Ext: No edema  neuro: Awake / alert  Psych: flat affect  Skin: normal``    LABS/DATA:    CARDIAC MARKERS:                                10.7   6.48  )-----------( 140      ( 16 Dec 2021 23:43 )             33.3     12-16    143  |  100  |  13  ----------------------------<  93  3.3<L>   |  31  |  0.45<L>    Ca    9.3      16 Dec 2021 23:43  Phos  2.5     12-16  Mg     2.0     12-16    TPro  6.3  /  Alb  3.6  /  TBili  0.7  /  DBili  x   /  AST  29  /  ALT  55<H>  /  AlkPhos  91  12-16    proBNP:   Lipid Profile:   HgA1c:   TSH:     TELE:  EKG:

## 2021-12-17 NOTE — CHART NOTE - NSCHARTNOTEFT_GEN_A_CORE
EEG reviewed. No seizures seen. Abnormal findings consistent with prior ICH and current systemic dysfunction. Can discontinue EEG. No need for MRI at this time unless no clear clinical improvement after management of infectious/metabolic factors. Neurology to sign off, please call as needed

## 2021-12-17 NOTE — PHYSICAL THERAPY INITIAL EVALUATION ADULT - GENERAL OBSERVATIONS, REHAB EVAL
Patient received semi reclined in bed, NAD, VSS, +PIV infusing, +NGT capped, +primafit, +5L O2 via NC, +ICU monitors

## 2021-12-17 NOTE — SWALLOW BEDSIDE ASSESSMENT ADULT - SPECIFY REASON(S)
to subjectively assess swallowing skills and r/o dysphagia.
to subjectively assess swallowing skills and r/o dysphagia.

## 2021-12-17 NOTE — SWALLOW BEDSIDE ASSESSMENT ADULT - PHARYNGEAL PHASE
No signs or symptoms of laryngeal penetration/aspiration evident with any consistency presented.  Pharyngeal swallow judged to be timely with adequate laryngeal elevation. No signs or symptoms of laryngeal penetration/aspiration evident; Pt denies sensation of pharyngeal residue.  Pharyngeal swallow judged to be timely with adequate laryngeal elevation.

## 2021-12-17 NOTE — SWALLOW BEDSIDE ASSESSMENT ADULT - ORAL PHASE
mild; slight oral residue cleared with repeat swallow./Decreased anterior-posterior movement of the bolus/Delayed oral transit time Within functional limits

## 2021-12-17 NOTE — DIETITIAN INITIAL EVALUATION ADULT. - ENTERAL
If EN feeds to continue, recommend: Jevity 1.5 at 75ml/hr x 18 hrs. To provide (based on dosing wt 72.6kg): 1350ml, 2025kcal (27.9kcal/kg) and 86g protein (1.18g protein/kg)

## 2021-12-17 NOTE — SWALLOW BEDSIDE ASSESSMENT ADULT - SLP GENERAL OBSERVATIONS
Pt awake, alert and oriented; easily engaged in conversation; hypophonia improved as evaluation continued; SP02 97-98%; 6 L 02 via nasal canula as Pt off bipap since this am; d/w Darío and MD.

## 2021-12-17 NOTE — PROGRESS NOTE ADULT - ASSESSMENT
Hypercapnic resp failure   work up and tx as per ICU    Bradycardia  stable  perhaps related to non perfusive PVCs on pulse ox     HTN  stable

## 2021-12-17 NOTE — EEG REPORT - NS EEG TEXT BOX
[facility]   COMPREHENSIVE EPILEPSY CENTER   REPORT OF LONG-TERM VIDEO EEG     [facility]: [address], [place] [address], Ph#: [phone]  [facility]: [id number] [place] [address], Ph#: [phone]  [facility]: [address] [place] [address], Ph#: [phone]    Patient Name: TONIA MAJANO  Age and : 86y ([birth year])  MRN #: [id number]  Location: [facility] MIC I520 W1  Referring Physician: Monico Ansari    Start Time/Date: 22:12 on 12/15/21  End Time/Date: 04:23 on 21  Duration: 6 h 11 min    _____________________________________________________________  STUDY INFORMATION    EEG Recording Technique:  The patient underwent continuous Video-EEG monitoring, using Telemetry System hardware on the XLTek Digital System. EEG and video data were stored on a computer hard drive with important events saved in digital archive files. The material was reviewed by a physician (electroencephalographer / epileptologist) on a daily basis. Boone and seizure detection algorithms were utilized and reviewed. An EEG Technician attended to the patient, and was available throughout daytime work hours.  The epilepsy center neurologist was available in person or on call 24-hours per day.    EEG Placement and Labeling of Electrodes:  The EEG was performed utilizing 20 channel referential EEG connections (coronal over temporal over parasagittal montage) using all standard 10-20 electrode placements with EKG, with additional electrodes placed in the inferior temporal region using the modified 10-10 montage electrode placements for elective admissions, or if deemed necessary. Recording was at a sampling rate of 256 samples per second per channel. Time synchronized digital video recording was done simultaneously with EEG recording. A low light infrared camera was used for low light recording.     _____________________________________________________________  HISTORY    Patient is a 86y old  Female who presents with a chief complaint of bradycardia (16 Dec 2021 07:36)      PERTINENT MEDICATION:  MEDICATIONS  (STANDING):  chlorhexidine 4% Liquid 1 Application(s) Topical <User Schedule>  enoxaparin Injectable 40 milliGRAM(s) SubCutaneous daily  levETIRAcetam  IVPB 500 milliGRAM(s) IV Intermittent every 12 hours  piperacillin/tazobactam IVPB.. 3.375 Gram(s) IV Intermittent every 8 hours    _____________________________________________________________  STUDY INTERPRETATION    Findings: The background was continuous, spontaneously variable and reactive. During wakefulness, the posterior dominant rhythm consisted of symmetric, well-modulated 6-7 Hz activity.    Background Slowing:  Diffuse theta and polymorphic delta slowing.    Focal Slowing:   Intermittent theta/delta slowing in the left frontal  region.    Sleep Background:  Drowsiness was characterized by fragmentation, attenuation, and slowing of the background activity.    Sleep was characterized by the presence of vertex waves, symmetric sleep spindles and K-complexes.    Other Non-Epileptiform Findings:  None were present.    Interictal Epileptiform Activity:   None were present.    Events:  Clinical events: None recorded.  Seizures: None recorded.    Activation Procedures:   Hyperventilation was not performed.    Photic stimulation was not performed.     Artifacts:  Intermittent myogenic and movement artifacts were noted.    ECG:  The heart rate on single channel ECG was irregular    _____________________________________________________________  EEG SUMMARY/CLASSIFICATION    Abnormal EEG  -Mild to moderate generalized slowing   - Subtle intermittent left frontal slowing  _____________________________________________________________  EEG IMPRESSION/CLINICAL CORRELATE    This EEG is consistent with mild to moderate diffuse cerebral dysfunction with superimposed focal left frontal cerebral dysfunction.  No epileptiform pattern or seizure seen.  _____________________________________________________________    Annmarie Melton DO  Attending Physician, [facility]    
[facility]   COMPREHENSIVE EPILEPSY CENTER   REPORT OF LONG-TERM VIDEO EEG     [facility]: [address], [place] [address], Ph#: [phone]  [facility]: [id number] [place] [address], Ph#: [phone]  [facility]: [address] [place] [address], Ph#: [phone]    Patient Name: TONIA MAJANO  Age and : 86y (36)  MRN #: [id number]  Location: [facility] MIC I520 W1  Referring Physician: Monico Ansari    Start Time/Date: 08:00 on 21  End Time/Date 12: on 21  Duration: 4 h 27 min    _____________________________________________________________  STUDY INFORMATION    EEG Recording Technique:  The patient underwent continuous Video-EEG monitoring, using Telemetry System hardware on the XLTek Digital System. EEG and video data were stored on a computer hard drive with important events saved in digital archive files. The material was reviewed by a physician (electroencephalographer / epileptologist) on a daily basis. Boone and seizure detection algorithms were utilized and reviewed. An EEG Technician attended to the patient, and was available throughout daytime work hours.  The epilepsy center neurologist was available in person or on call 24-hours per day.    EEG Placement and Labeling of Electrodes:  The EEG was performed utilizing 20 channel referential EEG connections (coronal over temporal over parasagittal montage) using all standard 10-20 electrode placements with EKG, with additional electrodes placed in the inferior temporal region using the modified 10-10 montage electrode placements for elective admissions, or if deemed necessary. Recording was at a sampling rate of 256 samples per second per channel. Time synchronized digital video recording was done simultaneously with EEG recording. A low light infrared camera was used for low light recording.     _____________________________________________________________  HISTORY    Patient is a 86y old  Female who presents with a chief complaint of bradycardia (16 Dec 2021 07:36)      PERTINENT MEDICATION:  MEDICATIONS  (STANDING):  chlorhexidine 4% Liquid 1 Application(s) Topical <User Schedule>  enoxaparin Injectable 40 milliGRAM(s) SubCutaneous daily  levETIRAcetam  IVPB 500 milliGRAM(s) IV Intermittent every 12 hours  piperacillin/tazobactam IVPB.. 3.375 Gram(s) IV Intermittent every 8 hours    _____________________________________________________________  STUDY INTERPRETATION    Findings: The background was continuous, spontaneously variable and reactive. During wakefulness, the posterior dominant rhythm consisted of symmetric, well-modulated 6-7 Hz activity.    Background Slowing:  Diffuse theta and polymorphic delta slowing.    Focal Slowing:   Intermittent theta/delta slowing in the left frontal  region.    Sleep Background:  Drowsiness was characterized by fragmentation, attenuation, and slowing of the background activity.    Sleep was characterized by the presence of vertex waves, symmetric sleep spindles and K-complexes.    Other Non-Epileptiform Findings:  None were present.    Interictal Epileptiform Activity:   None were present.    Events:  Clinical events: None recorded.  Seizures: None recorded.    Activation Procedures:   Hyperventilation was not performed.    Photic stimulation was not performed.     Artifacts:  Intermittent myogenic and movement artifacts were noted.    ECG:  The heart rate on single channel ECG was irregular    _____________________________________________________________  EEG SUMMARY/CLASSIFICATION    Abnormal EEG  -Mild to moderate generalized slowing   - Subtle intermittent left frontal slowing  _____________________________________________________________  EEG IMPRESSION/CLINICAL CORRELATE    This EEG is consistent with mild to moderate diffuse cerebral dysfunction with superimposed focal left frontal cerebral dysfunction.  No epileptiform pattern or seizure seen.  _____________________________________________________________    Annmarie Melton DO  Attending Physician, [facility]

## 2021-12-17 NOTE — DIETITIAN INITIAL EVALUATION ADULT. - ORAL INTAKE PTA/DIET HISTORY
Noted with decreased appetite x past few weeks PTA. Baseline tolerance to chewing/swallowing and baseline provision of energy/nutrient intake unclear. No documented food allergies. Per H&P home medication list, no indicating of vitamin/supplement use at baseline.

## 2021-12-17 NOTE — PROGRESS NOTE ADULT - SUBJECTIVE AND OBJECTIVE BOX
Santhosh Lopse MD  Internal Medicine Resident  [phone]    PATIENT:  TONIA MAJANO  [id number]    CHIEF COMPLAINT:  Patient is a 86y old  Female who presents with a chief complaint of bradycardia (16 Dec 2021 12:58)      INTERVAL HISTORY/OVERNIGHT EVENTS:      REVIEW OF SYSTEMS:    Constitutional:     [ ] negative [ ] fevers [ ] chills [ ] weight loss [ ] weight gain  HEENT:                  [ ] negative [ ] dry eyes [ ] eye irritation [ ] postnasal drip [ ] nasal congestion  CV:                         [ ] negative  [ ] chest pain [ ] orthopnea [ ] palpitations [ ] murmur  Resp:                     [ ] negative [ ] cough [ ] shortness of breath [ ] dyspnea [ ] wheezing [ ] sputum [ ] hemoptysis  GI:                          [ ] negative [ ] nausea [ ] vomiting [ ] diarrhea [ ] constipation [ ] abd pain [ ] dysphagia   :                        [ ] negative [ ] dysuria [ ] nocturia [ ] hematuria [ ] increased urinary frequency  Musculoskeletal: [ ] negative [ ] back pain [ ] myalgias [ ] arthralgias [ ] fracture  Skin:                       [ ] negative [ ] rash [ ] itch  Neurological:        [ ] negative [ ] headache [ ] dizziness [ ] syncope [ ] weakness [ ] numbness  Psychiatric:           [ ] negative [ ] anxiety [ ] depression  Endocrine:            [ ] negative [ ] diabetes [ ] thyroid problem  Heme/Lymph:      [ ] negative [ ] anemia [ ] bleeding problem  Allergic/Immune: [ ] negative [ ] itchy eyes [ ] nasal discharge [ ] hives [ ] angioedema    [ ] All other systems negative  [ ] Unable to assess ROS because ________.    MEDICATIONS:  MEDICATIONS  (STANDING):  albuterol/ipratropium for Nebulization 3 milliLiter(s) Nebulizer every 6 hours  buDESOnide    Inhalation Suspension 0.5 milliGRAM(s) Inhalation two times a day  chlorhexidine 4% Liquid 1 Application(s) Topical <User Schedule>  enoxaparin Injectable 40 milliGRAM(s) SubCutaneous daily  hydrALAZINE 25 milliGRAM(s) Oral every 8 hours  levETIRAcetam  IVPB 500 milliGRAM(s) IV Intermittent every 12 hours  piperacillin/tazobactam IVPB.. 3.375 Gram(s) IV Intermittent every 8 hours  polyethylene glycol 3350 17 Gram(s) Oral daily    MEDICATIONS  (PRN):      ALLERGIES:  Allergies    No Known Allergies    Intolerances        OBJECTIVE:  ICU Vital Signs Last 24 Hrs  T(C): 37.1 (17 Dec 2021 08:00), Max: 37.1 (16 Dec 2021 19:51)  T(F): 98.8 (17 Dec 2021 08:00), Max: 98.8 (16 Dec 2021 19:51)  HR: 71 (17 Dec 2021 07:00) (53 - 80)  BP: 140/64 (17 Dec 2021 07:00) (122/58 - 197/85)  BP(mean): 92 (17 Dec 2021 07:00) (84 - 122)  ABP: --  ABP(mean): --  RR: 17 (17 Dec 2021 07:00) (14 - 41)  SpO2: 99% (17 Dec 2021 07:00) (89% - 100%)      Adult Advanced Hemodynamics Last 24 Hrs  CVP(mm Hg): --  CVP(cm H2O): --  CO: --  CI: --  PA: --  PA(mean): --  PCWP: --  SVR: --  SVRI: --  PVR: --  PVRI: --  CAPILLARY BLOOD GLUCOSE      POCT Blood Glucose.: 75 mg/dL (16 Dec 2021 17:42)  POCT Blood Glucose.: 81 mg/dL (16 Dec 2021 10:42)    CAPILLARY BLOOD GLUCOSE      POCT Blood Glucose.: 75 mg/dL (16 Dec 2021 17:42)    I&O's Summary    16 Dec 2021 07:01  -  17 Dec 2021 07:00  --------------------------------------------------------  IN: 100 mL / OUT: 800 mL / NET: -700 mL      Daily     Daily     PHYSICAL EXAMINATION:  General: WN/WD NAD  HEENT: PERRLA, EOMI, moist mucous membranes  Neurology: A&Ox3, nonfocal, MAGANA x 4  Respiratory: CTA B/L, normal respiratory effort, no wheezes, crackles, rales  CV: RRR, S1S2, no murmurs, rubs or gallops  Abdominal: Soft, NT, ND +BS, Last BM  Extremities: No edema, + peripheral pulses  Incisions:   Tubes:    LABS:  ABG - ( 16 Dec 2021 23:38 )  pH, Arterial: 7.43  pH, Blood: x     /  pCO2: 58    /  pO2: 113   / HCO3: 38    / Base Excess: 12.2  /  SaO2: 100.0                                   10.7   6.48  )-----------( 140      ( 16 Dec 2021 23:43 )             33.3     12-16    143  |  100  |  13  ----------------------------<  93  3.3<L>   |  31  |  0.45<L>    Ca    9.3      16 Dec 2021 23:43  Phos  2.5     12-16  Mg     2.0     12-16    TPro  6.3  /  Alb  3.6  /  TBili  0.7  /  DBili  x   /  AST  29  /  ALT  55<H>  /  AlkPhos  91  12-16    LIVER FUNCTIONS - ( 16 Dec 2021 23:43 )  Alb: 3.6 g/dL / Pro: 6.3 g/dL / ALK PHOS: 91 U/L / ALT: 55 U/L / AST: 29 U/L / GGT: x           PT/INR - ( 16 Dec 2021 23:43 )   PT: 13.4 sec;   INR: 1.12 ratio         PTT - ( 16 Dec 2021 23:43 )  PTT:33.7 sec            TELEMETRY:     EKG:     IMAGING:       Santhosh Lopes MD  Internal Medicine Resident  [phone]    PATIENT:  TONIA MAJANO  [id number]    CHIEF COMPLAINT:  Patient is a 86y old  Female who presents with a chief complaint of bradycardia (16 Dec 2021 12:58)      INTERVAL HISTORY/OVERNIGHT EVENTS:  - overall feeling much better  - taken off AVAPS this morning    REVIEW OF SYSTEMS:  CONSTITUTIONAL: No weakness, fevers or chills  EYES/ENT: No visual changes;  No vertigo or throat pain   NECK: No pain or stiffness  RESPIRATORY: No cough, wheezing, hemoptysis; No shortness of breath  CARDIOVASCULAR: No chest pain or palpitations  GASTROINTESTINAL: No abdominal or epigastric pain. No nausea, vomiting, or hematemesis; No diarrhea or constipation. No melena or hematochezia.  GENITOURINARY: No dysuria, frequency or hematuria  NEUROLOGICAL: No numbness or weakness  SKIN: No itching, rashes    MEDICATIONS:  MEDICATIONS  (STANDING):  albuterol/ipratropium for Nebulization 3 milliLiter(s) Nebulizer every 6 hours  buDESOnide    Inhalation Suspension 0.5 milliGRAM(s) Inhalation two times a day  chlorhexidine 4% Liquid 1 Application(s) Topical <User Schedule>  enoxaparin Injectable 40 milliGRAM(s) SubCutaneous daily  hydrALAZINE 25 milliGRAM(s) Oral every 8 hours  levETIRAcetam  IVPB 500 milliGRAM(s) IV Intermittent every 12 hours  piperacillin/tazobactam IVPB.. 3.375 Gram(s) IV Intermittent every 8 hours  polyethylene glycol 3350 17 Gram(s) Oral daily    ALLERGIES:  Allergies  No Known Allergies    OBJECTIVE:  ICU Vital Signs Last 24 Hrs  T(C): 37.1 (17 Dec 2021 08:00), Max: 37.1 (16 Dec 2021 19:51)  T(F): 98.8 (17 Dec 2021 08:00), Max: 98.8 (16 Dec 2021 19:51)  HR: 71 (17 Dec 2021 07:00) (53 - 80)  BP: 140/64 (17 Dec 2021 07:00) (122/58 - 197/85)  BP(mean): 92 (17 Dec 2021 07:00) (84 - 122)  RR: 17 (17 Dec 2021 07:00) (14 - 41)  SpO2: 99% (17 Dec 2021 07:00) (89% - 100%)    POCT Blood Glucose.: 75 mg/dL (16 Dec 2021 17:42)  POCT Blood Glucose.: 81 mg/dL (16 Dec 2021 10:42)  POCT Blood Glucose.: 75 mg/dL (16 Dec 2021 17:42)    I&O's Summary  16 Dec 2021 07:01  -  17 Dec 2021 07:00  --------------------------------------------------------  IN: 100 mL / OUT: 800 mL / NET: -700 mL    PHYSICAL EXAMINATION:  General: WN/WD NAD  HEENT: PERRL, EOMI, moist mucous membranes  Neurology: A&Ox2, nonfocal, MAGANA x 4  Respiratory: CTA B/L, normal respiratory effort, no wheezes, crackles, rales  CV: RRR, S1S2, no murmurs, rubs or gallops  Abdominal: Soft, NT, ND +BS, Last BM  Extremities: No edema, + peripheral pulses    LABS:  ABG - ( 16 Dec 2021 23:38 )  pH, Arterial: 7.43  pH, Blood: x     /  pCO2: 58    /  pO2: 113   / HCO3: 38    / Base Excess: 12.2  /  SaO2: 100.0                                   10.7   6.48  )-----------( 140      ( 16 Dec 2021 23:43 )             33.3     12-16    143  |  100  |  13  ----------------------------<  93  3.3<L>   |  31  |  0.45<L>    Ca    9.3      16 Dec 2021 23:43  Phos  2.5     12-16  Mg     2.0     12-16    TPro  6.3  /  Alb  3.6  /  TBili  0.7  /  DBili  x   /  AST  29  /  ALT  55<H>  /  AlkPhos  91  12-16    LIVER FUNCTIONS - ( 16 Dec 2021 23:43 )  Alb: 3.6 g/dL / Pro: 6.3 g/dL / ALK PHOS: 91 U/L / ALT: 55 U/L / AST: 29 U/L / GGT: x           PT/INR - ( 16 Dec 2021 23:43 )   PT: 13.4 sec;   INR: 1.12 ratio         PTT - ( 16 Dec 2021 23:43 )  PTT:33.7 sec            TELEMETRY:     EKG:     IMAGING:

## 2021-12-17 NOTE — SWALLOW BEDSIDE ASSESSMENT ADULT - SWALLOW EVAL: DIAGNOSIS
Pt is an 87 y/o female s/p 2 RRTs transferred to MICU with hypercapnia, UTI and episode of bradycardia who presents with mild oral stage dysphagia most notable with solids characterized by prolonged mastication, formation and transfer of bolus.  Oral prep/oral skills deemed to be WFL for minced and moist texture.   No signs or symptoms of laryngeal penetration/aspiration evident with any consistency presented.  Pharyngeal swallow judged to be timely with adequate laryngeal elevation. Pt is an 87 y/o female s/p 2 RRTs transferred to MICU with hypercapnia, UTI and episode of bradycardia (with additional hx as above) who presents with mild oral stage dysphagia most notable with solids characterized by prolonged mastication, formation and transfer of bolus.  Oral prep/oral skills deemed to be WFL for minced and moist texture.   No signs or symptoms of laryngeal penetration/aspiration evident with any consistency presented.  Pharyngeal swallow judged to be timely with adequate laryngeal elevation.

## 2021-12-17 NOTE — DIETITIAN INITIAL EVALUATION ADULT. - CHIEF COMPLAINT
The patient is a 85 yo F with PMH: acromegaly, HTN, breast CA s/p mastectomy. Presented with c/o fatigue and bradycardia. Found to have +UA. Started on ABX. RRT 12/15 for mental status changes; ABG with respiratory acidosis, CT head with chronic bilateral basal ganglia infarcts. MICU consulted for hypercapnia with decreased mentation. Admitted to MICU for metabolic encephalopathy 2/2 UTI, also likely has chronic hypercapnia.

## 2021-12-17 NOTE — SWALLOW BEDSIDE ASSESSMENT ADULT - COMMENTS
addtnl hx: The patient was able to use an exercise bike for 15 minutes about 3-4 days ago without complaining of chest pain or dyspnea. She only has atraumatic back pain with exertion.  The patient was seen in the [facility] ED on 12/4/2021 for abdominal pain, and discharged home with augmentin for proctocolitis.  Pt had rapid response 12/15/21 am for AMS, pt was reportedly AAOx2-3 then became increasingly lethargic, yet still responsive to commands. ABG showed pH 7.28, pCO2 79, UA + many bacteria, + nitrite, moderate LE. BCx were sent, zosyn x1 given, AVAPS started, pt's mental status appeared to improve with AVAPS. 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  Pt had 2nd RRT on 12/15 evening for bradycardia down to upper 30's, sinus hasmukh to 45-50, BPs stable and satting well on BIPAP. EKG showed sinus hasmukh, no AV block. Kept on BIPAP.
12/15/21 RRT in am for altered MS with repeat RRT later in day for Bradycardia; +Bipap; Per Pulmonary: Acute respiratory failure with hypercapnia.  Pt transferred to MICU.  12/16/21 Per Cardiology: Bradycardia; stable; perhaps related to non perfusive PVCs on pulse ox.  12/17/21 SLP discussed Pt with MD in am; Pt off Bipap with 6L supplemental 02 via nasal cannula; upon MD review of Pt's blood gases with Nsg, pH arterial 7.4; total C02 40; MD cleared Pt for swallowing evaluation in am.  Per Neurology: EEG reviewed. No seizures seen. Abnormal findings consistent with prior ICH and current systemic dysfunction. Can discontinue EEG. No need for MRI at this time unless no clear clinical improvement after management of infectious/metabolic factors.

## 2021-12-17 NOTE — PHYSICAL THERAPY INITIAL EVALUATION ADULT - PERTINENT HX OF CURRENT PROBLEM, REHAB EVAL
87yo lady with PMH of acromegaly, htn, breast CA s/p L mastectomy who presents to the ED with complaint of fatigue and bradycardia. 12/15: Head CT: No hydrocephalus, acute intracranial hemorrhage, mass effect, or brain edema. Moderate white matter microvascular ischemic disease. Chronic bilateral basal ganglia infarcts

## 2021-12-17 NOTE — DIETITIAN INITIAL EVALUATION ADULT. - ADD RECOMMEND
1. Monitor GI tolerance. RD to remain available to adjust EN formulary, volume/rate PRN. 2. Defer initiation of PO diet to medical team; defer consistency/texture to medical team, SLP. 3. Monitor wt trends/labs/skin integrity/hydration status/bowel regularity. 4. Recommend multivitamin (if no medication contraindications) to aid in prevention of micronutrient deficiencies. 5. Malnutrition sticker placed.

## 2021-12-17 NOTE — SWALLOW BEDSIDE ASSESSMENT ADULT - SLP PERTINENT HISTORY OF CURRENT PROBLEM
This patient is an 85yo lady with PMH of acromegaly, htn, breast CA s/p L mastectomy who presents to the ED with complaint of fatigue and bradycardia. History was provided by son at bedside. The patient has had progressive decline in activity over the last few weeks. She has been sleeping more, eating less, and ambulating slower. She has intermittent confusion about dates, however she recognizes family. Son noticed her gait is much slower. She had no fever, chills, or vomiting or nausea. She has chronic urinary incontinence.  Found to have +UA, started on abx.  Pt s/p 2 RRTs (12/15/21); ABG with respiratory acidosis, CT head with chronic bilateral basal ganglia infarcts. Unable to obtain ROS, pt lethargic on bipap.

## 2021-12-17 NOTE — PROGRESS NOTE ADULT - SUBJECTIVE AND OBJECTIVE BOX
Name of Patient : TONIA MAJANO  MRN: [id number]  Date of visit: 12-17-21 @ 18:16      Subjective: Patient seen and examined. No new events except as noted.     REVIEW OF SYSTEMS:    CONSTITUTIONAL: No weakness, fevers or chills  EYES/ENT: No visual changes;  No vertigo or throat pain   NECK: No pain or stiffness  RESPIRATORY: No cough, wheezing, hemoptysis; No shortness of breath  CARDIOVASCULAR: No chest pain or palpitations  GASTROINTESTINAL: No abdominal or epigastric pain. No nausea, vomiting, or hematemesis; No diarrhea or constipation. No melena or hematochezia.  GENITOURINARY: No dysuria, frequency or hematuria  NEUROLOGICAL: No numbness or weakness  SKIN: No itching, burning, rashes, or lesions   All other review of systems is negative unless indicated above.    MEDICATIONS:  MEDICATIONS  (STANDING):  albuterol/ipratropium for Nebulization 3 milliLiter(s) Nebulizer every 6 hours  buDESOnide    Inhalation Suspension 0.5 milliGRAM(s) Inhalation two times a day  chlorhexidine 4% Liquid 1 Application(s) Topical <User Schedule>  enoxaparin Injectable 40 milliGRAM(s) SubCutaneous daily  hydrALAZINE 25 milliGRAM(s) Oral every 8 hours  levETIRAcetam  IVPB 500 milliGRAM(s) IV Intermittent every 12 hours  piperacillin/tazobactam IVPB.. 3.375 Gram(s) IV Intermittent every 8 hours  polyethylene glycol 3350 17 Gram(s) Oral daily      PHYSICAL EXAM:  T(C): 36.5 (12-17-21 @ 16:00), Max: 37.1 (12-16-21 @ 19:51)  HR: 84 (12-17-21 @ 16:00) (61 - 86)  BP: 156/70 (12-17-21 @ 16:00) (134/60 - 197/85)  RR: 34 (12-17-21 @ 16:00) (15 - 34)  SpO2: 97% (12-17-21 @ 16:00) (94% - 100%)  Wt(kg): --  I&O's Summary    16 Dec 2021 07:01  -  17 Dec 2021 07:00  --------------------------------------------------------  IN: 100 mL / OUT: 800 mL / NET: -700 mL    17 Dec 2021 07:01  -  17 Dec 2021 18:16  --------------------------------------------------------  IN: 440 mL / OUT: 250 mL / NET: 190 mL          Appearance: Normal	  HEENT:  PERRLA   Lymphatic: No lymphadenopathy   Cardiovascular: Normal S1 S2, no JVD  Respiratory: normal effort , clear  Gastrointestinal:  Soft, Non-tender  Skin: No rashes,  warm to touch  Psychiatry:  Mood & affect appropriate  Musculuskeletal: No edema      All labs, Imaging and EKGs personally reviewed       12-16-21 @ 07:01  -  12-17-21 @ 07:00  --------------------------------------------------------  IN: 100 mL / OUT: 800 mL / NET: -700 mL    12-17-21 @ 07:01  -  12-17-21 @ 18:16  --------------------------------------------------------  IN: 440 mL / OUT: 250 mL / NET: 190 mL                          10.7   6.48  )-----------( 140      ( 16 Dec 2021 23:43 )             33.3               12-16    143  |  100  |  13  ----------------------------<  93  3.3<L>   |  31  |  0.45<L>    Ca    9.3      16 Dec 2021 23:43  Phos  2.5     12-16  Mg     2.0     12-16    TPro  6.3  /  Alb  3.6  /  TBili  0.7  /  DBili  x   /  AST  29  /  ALT  55<H>  /  AlkPhos  91  12-16    PT/INR - ( 16 Dec 2021 23:43 )   PT: 13.4 sec;   INR: 1.12 ratio         PTT - ( 16 Dec 2021 23:43 )  PTT:33.7 sec                 ABG - ( 17 Dec 2021 09:49 )  pH, Arterial: 7.40  pH, Blood: x     /  pCO2: 62    /  pO2: 116   / HCO3: 38    / Base Excess: 11.5  /  SaO2: 99.1

## 2021-12-18 LAB
-  AMIKACIN: SIGNIFICANT CHANGE UP
-  AMOXICILLIN/CLAVULANIC ACID: SIGNIFICANT CHANGE UP
-  AMPICILLIN/SULBACTAM: SIGNIFICANT CHANGE UP
-  AMPICILLIN: SIGNIFICANT CHANGE UP
-  AZTREONAM: SIGNIFICANT CHANGE UP
-  CEFAZOLIN: SIGNIFICANT CHANGE UP
-  CEFEPIME: SIGNIFICANT CHANGE UP
-  CEFOXITIN: SIGNIFICANT CHANGE UP
-  CEFTRIAXONE: SIGNIFICANT CHANGE UP
-  CIPROFLOXACIN: SIGNIFICANT CHANGE UP
-  ERTAPENEM: SIGNIFICANT CHANGE UP
-  GENTAMICIN: SIGNIFICANT CHANGE UP
-  IMIPENEM: SIGNIFICANT CHANGE UP
-  LEVOFLOXACIN: SIGNIFICANT CHANGE UP
-  MEROPENEM: SIGNIFICANT CHANGE UP
-  NITROFURANTOIN: SIGNIFICANT CHANGE UP
-  PIPERACILLIN/TAZOBACTAM: SIGNIFICANT CHANGE UP
-  TIGECYCLINE: SIGNIFICANT CHANGE UP
-  TOBRAMYCIN: SIGNIFICANT CHANGE UP
-  TRIMETHOPRIM/SULFAMETHOXAZOLE: SIGNIFICANT CHANGE UP
ALBUMIN SERPL ELPH-MCNC: 3.5 G/DL — SIGNIFICANT CHANGE UP (ref 3.3–5)
ALBUMIN SERPL ELPH-MCNC: 3.6 G/DL — SIGNIFICANT CHANGE UP (ref 3.3–5)
ALP SERPL-CCNC: 80 U/L — SIGNIFICANT CHANGE UP (ref 40–120)
ALP SERPL-CCNC: 82 U/L — SIGNIFICANT CHANGE UP (ref 40–120)
ALT FLD-CCNC: 34 U/L — SIGNIFICANT CHANGE UP (ref 10–45)
ALT FLD-CCNC: 38 U/L — SIGNIFICANT CHANGE UP (ref 10–45)
ANION GAP SERPL CALC-SCNC: 10 MMOL/L — SIGNIFICANT CHANGE UP (ref 5–17)
ANION GAP SERPL CALC-SCNC: 11 MMOL/L — SIGNIFICANT CHANGE UP (ref 5–17)
APTT BLD: 29.8 SEC — SIGNIFICANT CHANGE UP (ref 27.5–35.5)
AST SERPL-CCNC: 17 U/L — SIGNIFICANT CHANGE UP (ref 10–40)
AST SERPL-CCNC: 17 U/L — SIGNIFICANT CHANGE UP (ref 10–40)
BILIRUB SERPL-MCNC: 0.9 MG/DL — SIGNIFICANT CHANGE UP (ref 0.2–1.2)
BILIRUB SERPL-MCNC: 1 MG/DL — SIGNIFICANT CHANGE UP (ref 0.2–1.2)
BUN SERPL-MCNC: 12 MG/DL — SIGNIFICANT CHANGE UP (ref 7–23)
BUN SERPL-MCNC: 14 MG/DL — SIGNIFICANT CHANGE UP (ref 7–23)
CALCIUM SERPL-MCNC: 8.7 MG/DL — SIGNIFICANT CHANGE UP (ref 8.4–10.5)
CALCIUM SERPL-MCNC: 8.7 MG/DL — SIGNIFICANT CHANGE UP (ref 8.4–10.5)
CHLORIDE SERPL-SCNC: 98 MMOL/L — SIGNIFICANT CHANGE UP (ref 96–108)
CHLORIDE SERPL-SCNC: 99 MMOL/L — SIGNIFICANT CHANGE UP (ref 96–108)
CO2 SERPL-SCNC: 33 MMOL/L — HIGH (ref 22–31)
CO2 SERPL-SCNC: 33 MMOL/L — HIGH (ref 22–31)
CREAT SERPL-MCNC: 0.45 MG/DL — LOW (ref 0.5–1.3)
CREAT SERPL-MCNC: 0.51 MG/DL — SIGNIFICANT CHANGE UP (ref 0.5–1.3)
CULTURE RESULTS: SIGNIFICANT CHANGE UP
GAS PNL BLDA: SIGNIFICANT CHANGE UP
GAS PNL BLDA: SIGNIFICANT CHANGE UP
GLUCOSE SERPL-MCNC: 109 MG/DL — HIGH (ref 70–99)
GLUCOSE SERPL-MCNC: 110 MG/DL — HIGH (ref 70–99)
INR BLD: 1.12 RATIO — SIGNIFICANT CHANGE UP (ref 0.88–1.16)
MAGNESIUM SERPL-MCNC: 1.9 MG/DL — SIGNIFICANT CHANGE UP (ref 1.6–2.6)
MAGNESIUM SERPL-MCNC: 2 MG/DL — SIGNIFICANT CHANGE UP (ref 1.6–2.6)
METHOD TYPE: SIGNIFICANT CHANGE UP
ORGANISM # SPEC MICROSCOPIC CNT: SIGNIFICANT CHANGE UP
ORGANISM # SPEC MICROSCOPIC CNT: SIGNIFICANT CHANGE UP
PHOSPHATE SERPL-MCNC: 2.3 MG/DL — LOW (ref 2.5–4.5)
PHOSPHATE SERPL-MCNC: 3.7 MG/DL — SIGNIFICANT CHANGE UP (ref 2.5–4.5)
POTASSIUM SERPL-MCNC: 3 MMOL/L — LOW (ref 3.5–5.3)
POTASSIUM SERPL-MCNC: 3.4 MMOL/L — LOW (ref 3.5–5.3)
POTASSIUM SERPL-SCNC: 3 MMOL/L — LOW (ref 3.5–5.3)
POTASSIUM SERPL-SCNC: 3.4 MMOL/L — LOW (ref 3.5–5.3)
PROT SERPL-MCNC: 6.1 G/DL — SIGNIFICANT CHANGE UP (ref 6–8.3)
PROT SERPL-MCNC: 6.2 G/DL — SIGNIFICANT CHANGE UP (ref 6–8.3)
PROTHROM AB SERPL-ACNC: 13.4 SEC — SIGNIFICANT CHANGE UP (ref 10.6–13.6)
SODIUM SERPL-SCNC: 142 MMOL/L — SIGNIFICANT CHANGE UP (ref 135–145)
SODIUM SERPL-SCNC: 142 MMOL/L — SIGNIFICANT CHANGE UP (ref 135–145)
SPECIMEN SOURCE: SIGNIFICANT CHANGE UP

## 2021-12-18 PROCEDURE — 99233 SBSQ HOSP IP/OBS HIGH 50: CPT

## 2021-12-18 RX ORDER — LEVETIRACETAM 250 MG/1
500 TABLET, FILM COATED ORAL
Refills: 0 | Status: DISCONTINUED | OUTPATIENT
Start: 2021-12-18 | End: 2021-12-27

## 2021-12-18 RX ORDER — POTASSIUM CHLORIDE 20 MEQ
10 PACKET (EA) ORAL
Refills: 0 | Status: COMPLETED | OUTPATIENT
Start: 2021-12-18 | End: 2021-12-18

## 2021-12-18 RX ORDER — POTASSIUM CHLORIDE 20 MEQ
40 PACKET (EA) ORAL ONCE
Refills: 0 | Status: COMPLETED | OUTPATIENT
Start: 2021-12-18 | End: 2021-12-18

## 2021-12-18 RX ORDER — POTASSIUM PHOSPHATE, MONOBASIC POTASSIUM PHOSPHATE, DIBASIC 236; 224 MG/ML; MG/ML
30 INJECTION, SOLUTION INTRAVENOUS ONCE
Refills: 0 | Status: COMPLETED | OUTPATIENT
Start: 2021-12-18 | End: 2021-12-18

## 2021-12-18 RX ADMIN — Medication 40 MILLIEQUIVALENT(S): at 10:40

## 2021-12-18 RX ADMIN — PIPERACILLIN AND TAZOBACTAM 25 GRAM(S): 4; .5 INJECTION, POWDER, LYOPHILIZED, FOR SOLUTION INTRAVENOUS at 05:11

## 2021-12-18 RX ADMIN — Medication 3 MILLILITER(S): at 12:02

## 2021-12-18 RX ADMIN — Medication 25 MILLIGRAM(S): at 13:27

## 2021-12-18 RX ADMIN — PIPERACILLIN AND TAZOBACTAM 25 GRAM(S): 4; .5 INJECTION, POWDER, LYOPHILIZED, FOR SOLUTION INTRAVENOUS at 21:58

## 2021-12-18 RX ADMIN — Medication 0.5 MILLIGRAM(S): at 05:57

## 2021-12-18 RX ADMIN — POLYETHYLENE GLYCOL 3350 17 GRAM(S): 17 POWDER, FOR SOLUTION ORAL at 12:25

## 2021-12-18 RX ADMIN — Medication 3 MILLILITER(S): at 17:10

## 2021-12-18 RX ADMIN — ENOXAPARIN SODIUM 40 MILLIGRAM(S): 100 INJECTION SUBCUTANEOUS at 12:25

## 2021-12-18 RX ADMIN — CHLORHEXIDINE GLUCONATE 1 APPLICATION(S): 213 SOLUTION TOPICAL at 05:20

## 2021-12-18 RX ADMIN — Medication 3 MILLILITER(S): at 05:57

## 2021-12-18 RX ADMIN — LEVETIRACETAM 400 MILLIGRAM(S): 250 TABLET, FILM COATED ORAL at 05:11

## 2021-12-18 RX ADMIN — Medication 25 MILLIGRAM(S): at 21:58

## 2021-12-18 RX ADMIN — Medication 25 MILLIGRAM(S): at 05:11

## 2021-12-18 RX ADMIN — POTASSIUM PHOSPHATE, MONOBASIC POTASSIUM PHOSPHATE, DIBASIC 83.33 MILLIMOLE(S): 236; 224 INJECTION, SOLUTION INTRAVENOUS at 01:00

## 2021-12-18 RX ADMIN — PIPERACILLIN AND TAZOBACTAM 25 GRAM(S): 4; .5 INJECTION, POWDER, LYOPHILIZED, FOR SOLUTION INTRAVENOUS at 13:27

## 2021-12-18 RX ADMIN — Medication 0.5 MILLIGRAM(S): at 17:43

## 2021-12-18 RX ADMIN — Medication 100 MILLIEQUIVALENT(S): at 02:56

## 2021-12-18 RX ADMIN — Medication 100 MILLIEQUIVALENT(S): at 01:36

## 2021-12-18 RX ADMIN — Medication 3 MILLILITER(S): at 00:26

## 2021-12-18 RX ADMIN — Medication 100 MILLIEQUIVALENT(S): at 00:47

## 2021-12-18 RX ADMIN — LEVETIRACETAM 500 MILLIGRAM(S): 250 TABLET, FILM COATED ORAL at 17:16

## 2021-12-18 NOTE — PATIENT PROFILE ADULT - FALL HARM RISK - HARM RISK INTERVENTIONS

## 2021-12-18 NOTE — PROGRESS NOTE ADULT - SUBJECTIVE AND OBJECTIVE BOX
DATE OF SERVICE: 12-18-21 @ 13:08    Subjective: Patient seen and examined. No new events except as noted.     SUBJECTIVE/ROS:  feels ok   more awake       MEDICATIONS:  MEDICATIONS  (STANDING):  albuterol/ipratropium for Nebulization 3 milliLiter(s) Nebulizer every 6 hours  buDESOnide    Inhalation Suspension 0.5 milliGRAM(s) Inhalation two times a day  chlorhexidine 4% Liquid 1 Application(s) Topical <User Schedule>  enoxaparin Injectable 40 milliGRAM(s) SubCutaneous daily  hydrALAZINE 25 milliGRAM(s) Oral every 8 hours  levETIRAcetam 500 milliGRAM(s) Oral two times a day  piperacillin/tazobactam IVPB.. 3.375 Gram(s) IV Intermittent every 8 hours  polyethylene glycol 3350 17 Gram(s) Oral daily      PHYSICAL EXAM:  T(C): 37.1 (12-18-21 @ 12:00), Max: 37.7 (12-17-21 @ 20:00)  HR: 81 (12-18-21 @ 13:00) (68 - 87)  BP: 120/55 (12-18-21 @ 13:00) (120/55 - 163/70)  RR: 27 (12-18-21 @ 13:00) (12 - 38)  SpO2: 90% (12-18-21 @ 13:00) (90% - 100%)  Wt(kg): --  I&O's Summary    17 Dec 2021 07:01  -  18 Dec 2021 07:00  --------------------------------------------------------  IN: 1684.8 mL / OUT: 1100 mL / NET: 584.8 mL    18 Dec 2021 07:01  -  18 Dec 2021 13:08  --------------------------------------------------------  IN: 240 mL / OUT: 300 mL / NET: -60 mL            JVP: Normal  Neck: supple  Lung: clear   CV: S1 S2 , Murmur:  Abd: soft  Ext: No edema  neuro: Awake / alert  Psych: flat affect  Skin: normal``    LABS/DATA:    CARDIAC MARKERS:                                10.3   6.38  )-----------( 133      ( 17 Dec 2021 23:44 )             32.2     12-18    142  |  98  |  12  ----------------------------<  110<H>  3.4<L>   |  33<H>  |  0.45<L>    Ca    8.7      18 Dec 2021 09:27  Phos  3.7     12-18  Mg     1.9     12-18    TPro  6.2  /  Alb  3.6  /  TBili  1.0  /  DBili  x   /  AST  17  /  ALT  34  /  AlkPhos  80  12-18    proBNP:   Lipid Profile:   HgA1c:   TSH:     TELE:  EKG:

## 2021-12-18 NOTE — PROGRESS NOTE ADULT - SUBJECTIVE AND OBJECTIVE BOX
Name of Patient : TONIA MAJANO  MRN: [id number]  Date of visit: 12-18-21       Subjective: Patient seen and examined. No new events except as noted.       MEDICATIONS:  MEDICATIONS  (STANDING):  albuterol/ipratropium for Nebulization 3 milliLiter(s) Nebulizer every 6 hours  buDESOnide    Inhalation Suspension 0.5 milliGRAM(s) Inhalation two times a day  chlorhexidine 4% Liquid 1 Application(s) Topical <User Schedule>  enoxaparin Injectable 40 milliGRAM(s) SubCutaneous daily  hydrALAZINE 25 milliGRAM(s) Oral every 8 hours  levETIRAcetam 500 milliGRAM(s) Oral two times a day  piperacillin/tazobactam IVPB.. 3.375 Gram(s) IV Intermittent every 8 hours  polyethylene glycol 3350 17 Gram(s) Oral daily      PHYSICAL EXAM:  T(C): 37.2 (12-18-21 @ 16:00), Max: 37.7 (12-18-21 @ 00:00)  HR: 82 (12-18-21 @ 19:00) (68 - 84)  BP: 148/65 (12-18-21 @ 19:00) (120/55 - 162/71)  RR: 25 (12-18-21 @ 19:00) (12 - 38)  SpO2: 94% (12-18-21 @ 19:00) (90% - 100%)  Wt(kg): --  I&O's Summary    17 Dec 2021 07:01  -  18 Dec 2021 07:00  --------------------------------------------------------  IN: 1684.8 mL / OUT: 1100 mL / NET: 584.8 mL    18 Dec 2021 07:01  -  18 Dec 2021 22:49  --------------------------------------------------------  IN: 240 mL / OUT: 300 mL / NET: -60 mL          Appearance: Normal	  HEENT:  PERRLA   Lymphatic: No lymphadenopathy   Cardiovascular: Normal S1 S2, no JVD  Respiratory: normal effort , clear  Gastrointestinal:  Soft, Non-tender  Skin: No rashes,  warm to touch  Psychiatry:  Mood & affect appropriate  Musculuskeletal: No edema      All labs, Imaging and EKGs personally reviewed     12-17-21 @ 07:01  -  12-18-21 @ 07:00  --------------------------------------------------------  IN: 1684.8 mL / OUT: 1100 mL / NET: 584.8 mL    12-18-21 @ 07:01  -  12-18-21 @ 22:49  --------------------------------------------------------  IN: 240 mL / OUT: 300 mL / NET: -60 mL                            10.3   6.38  )-----------( 133      ( 17 Dec 2021 23:44 )             32.2               12-18    142  |  98  |  12  ----------------------------<  110<H>  3.4<L>   |  33<H>  |  0.45<L>    Ca    8.7      18 Dec 2021 09:27  Phos  3.7     12-18  Mg     1.9     12-18    TPro  6.2  /  Alb  3.6  /  TBili  1.0  /  DBili  x   /  AST  17  /  ALT  34  /  AlkPhos  80  12-18    PT/INR - ( 17 Dec 2021 23:44 )   PT: 13.4 sec;   INR: 1.12 ratio         PTT - ( 17 Dec 2021 23:44 )  PTT:29.8 sec                 ABG - ( 18 Dec 2021 09:48 )  pH, Arterial: 7.46  pH, Blood: x     /  pCO2: 54    /  pO2: 116   / HCO3: 38    / Base Excess: 12.7  /  SaO2: 99.9

## 2021-12-18 NOTE — PROGRESS NOTE ADULT - ASSESSMENT
Assessment & Plan:   87 y/o F with PMH of acromegaly, HTN, breast CA s/p L mastectomy. Presents to ED with c/o fatigue and bradycardia. Found to have +UA, started on abx. 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 MICU consulted for hypercapnia with decreased mentation. Repeat ABG with ongoing hypercapnia but stable pH. 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 EKG showed sinus hasmukh, no AV block.  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  - pBNP 3515  - Lasix 20 mg 12/15  - holding home Toprol  mg daily for now    #Pulmonary  - ABG during RRT 12/15 7.28, pCO2 78, pO2 126, HCO3 37; VBG later showed pH 7.17, pCO2 85, pO2 38, HCO3 31  - likely has underlying chronic hypercapnia  - brought to MICU for AVAPS, never intubated  - CT chest when able  - pulm follow for hypercapnia when downgraded from MICU    #Renal  - no active issues  - ins outs    #Infectious disease  - many bacteria on UA, + LE, moderate nitrite  - likely has sepsis 2/2 UTI, contributing to current lethargy  - f/u BCx x 2, UCx  - zosyn (12/15 - )    #Hematology/Oncology  - platelets 166 -> 134, thrombocytopenic, will CTM  - maintain active T&S    #Gastrointestinal  - passed dysphagia eval 10/16 -> ordered for diet  - monitor glucose    #Psychiatric  - No active issues    #PPx  DVT: lovenox    #Ethics  Full code  Sánchez is son, (HCP, still needs to bring forms in) [phone]   Assessment & Plan:   85 y/o F with PMH of acromegaly, HTN, breast CA s/p L mastectomy. Presents to ED with c/o fatigue and bradycardia. Found to have +UA, started on abx. 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 MICU consulted for hypercapnia with decreased mentation. Repeat ABG with ongoing hypercapnia but stable pH. 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 EKG showed sinus hasmukh, no AV block.  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  - pBNP 3515  - Lasix 20 mg 12/15  - holding home Toprol  mg daily for now    #Pulmonary  - ABG during RRT 12/15 7.28, pCO2 78, pO2 126, HCO3 37; VBG later showed pH 7.17, pCO2 85, pO2 38, HCO3 31  - likely has underlying chronic hypercapnia  - brought to MICU for AVAPS, never intubated  - CT chest when able  - pulm follow for hypercapnia when downgraded from MICU, will likely need AVAPS at night    #Renal  - no active issues  - ins outs    #Infectious disease  - many bacteria on UA, + LE, moderate nitrite  - likely has sepsis 2/2 UTI, contributing to current lethargy  - f/u BCx x 2, UCx  - zosyn (12/15 - )    #Hematology/Oncology  - platelets 166 -> 134, thrombocytopenic, will CTM  - maintain active T&S    #Gastrointestinal  - passed dysphagia eval 10/16 -> ordered for diet  - monitor glucose    #Psychiatric  - No active issues    #PPx  DVT: lovenox    #Ethics  Full code  Sánchez is son, (HCP, still needs to bring forms in) [phone]

## 2021-12-18 NOTE — PROGRESS NOTE ADULT - SUBJECTIVE AND OBJECTIVE BOX
MICU PROGRESS NOTE    INTERVAL HPI/OVERNIGHT EVENTS:    SUBJECTIVE:     OBJECTIVE:    VITAL SIGNS:  ICU Vital Signs Last 24 Hrs  T(C): 37.2 (18 Dec 2021 04:00), Max: 37.7 (17 Dec 2021 20:00)  T(F): 99 (18 Dec 2021 04:00), Max: 99.9 (17 Dec 2021 20:00)  HR: 68 (18 Dec 2021 05:00) (61 - 87)  BP: 140/63 (18 Dec 2021 05:00) (129/60 - 197/85)  BP(mean): 91 (18 Dec 2021 05:00) (87 - 122)  ABP: --  ABP(mean): --  RR: 18 (18 Dec 2021 05:00) (12 - 34)  SpO2: 97% (18 Dec 2021 05:00) (94% - 100%)      VENTS:      I&O:    12-16 @ 07:01  -  12-17 @ 07:00  --------------------------------------------------------  IN: 100 mL / OUT: 800 mL / NET: -700 mL    12-17 @ 07:01  -  12-18 @ 05:55  --------------------------------------------------------  IN: 1393.2 mL / OUT: 850 mL / NET: 543.2 mL        PHYSICAL EXAM:  GENERAL: NAD, conversant  CHEST/LUNG: Clear to auscultation bilaterally; No crackles, rhonchi, wheezing, or rubs  HEART: Regular rate and rhythm; No murmurs, rubs, or gallops  ABDOMEN: Soft, Nontender, Nondistended; Bowel sounds present  EXTREMITIES:  2+ Peripheral Pulses, No clubbing, cyanosis, or edema  SKIN: No rashes or lesions  NERVOUS SYSTEM:  Alert & Oriented, Good concentration      LINES:                                       MEDICATIONS:  MEDICATIONS  (STANDING):  albuterol/ipratropium for Nebulization 3 milliLiter(s) Nebulizer every 6 hours  buDESOnide    Inhalation Suspension 0.5 milliGRAM(s) Inhalation two times a day  chlorhexidine 4% Liquid 1 Application(s) Topical <User Schedule>  enoxaparin Injectable 40 milliGRAM(s) SubCutaneous daily  hydrALAZINE 25 milliGRAM(s) Oral every 8 hours  levETIRAcetam  IVPB 500 milliGRAM(s) IV Intermittent every 12 hours  piperacillin/tazobactam IVPB.. 3.375 Gram(s) IV Intermittent every 8 hours  polyethylene glycol 3350 17 Gram(s) Oral daily    MEDICATIONS  (PRN):      ALLERGIES:  Allergies    No Known Allergies    Intolerances        LABS:                        10.3   6.38  )-----------( 133      ( 17 Dec 2021 23:44 )             32.2     12-17    142  |  99  |  14  ----------------------------<  109<H>  3.0<L>   |  33<H>  |  0.51    Ca    8.7      17 Dec 2021 23:44  Phos  2.3     12-17  Mg     2.0     12-17    TPro  6.1  /  Alb  3.5  /  TBili  0.9  /  DBili  x   /  AST  17  /  ALT  38  /  AlkPhos  82  12-17    PT/INR - ( 17 Dec 2021 23:44 )   PT: 13.4 sec;   INR: 1.12 ratio         PTT - ( 17 Dec 2021 23:44 )  PTT:29.8 sec      CAPILLARY BLOOD GLUCOSE      POCT Blood Glucose.: 75 mg/dL (16 Dec 2021 17:42)      RADIOLOGY & ADDITIONAL TESTS: Reviewed. MICU PROGRESS NOTE    INTERVAL HPI/OVERNIGHT EVENTS:    SUBJECTIVE:   No acute events overnight. [facility]. She denies SOB, CP, abdominal pain, diarrhea, constipation.    OBJECTIVE:    VITAL SIGNS:  ICU Vital Signs Last 24 Hrs  T(C): 37.2 (18 Dec 2021 04:00), Max: 37.7 (17 Dec 2021 20:00)  T(F): 99 (18 Dec 2021 04:00), Max: 99.9 (17 Dec 2021 20:00)  HR: 68 (18 Dec 2021 05:00) (61 - 87)  BP: 140/63 (18 Dec 2021 05:00) (129/60 - 197/85)  BP(mean): 91 (18 Dec 2021 05:00) (87 - 122)  ABP: --  ABP(mean): --  RR: 18 (18 Dec 2021 05:00) (12 - 34)  SpO2: 97% (18 Dec 2021 05:00) (94% - 100%)      VENTS:      I&O:    12-16 @ 07:01  -  12-17 @ 07:00  --------------------------------------------------------  IN: 100 mL / OUT: 800 mL / NET: -700 mL    12-17 @ 07:01  -  12-18 @ 05:55  --------------------------------------------------------  IN: 1393.2 mL / OUT: 850 mL / NET: 543.2 mL        PHYSICAL EXAMINATION:  General: WN/WD NAD  HEENT: PERRL, EOMI, moist mucous membranes  Neurology: A&Ox2, nonfocal, MAGANA x 4  Respiratory: (+) coarse breath sounds B/L, normal respiratory effort, no wheezes, crackles, rales  CV: RRR, S1S2, no murmurs, rubs or gallops  Abdominal: Soft, NT, ND +BS, Last BM  Extremities: No edema, + peripheral pulses  LINES:                                       MEDICATIONS:  MEDICATIONS  (STANDING):  albuterol/ipratropium for Nebulization 3 milliLiter(s) Nebulizer every 6 hours  buDESOnide    Inhalation Suspension 0.5 milliGRAM(s) Inhalation two times a day  chlorhexidine 4% Liquid 1 Application(s) Topical <User Schedule>  enoxaparin Injectable 40 milliGRAM(s) SubCutaneous daily  hydrALAZINE 25 milliGRAM(s) Oral every 8 hours  levETIRAcetam  IVPB 500 milliGRAM(s) IV Intermittent every 12 hours  piperacillin/tazobactam IVPB.. 3.375 Gram(s) IV Intermittent every 8 hours  polyethylene glycol 3350 17 Gram(s) Oral daily    MEDICATIONS  (PRN):      ALLERGIES:  Allergies    No Known Allergies    Intolerances        LABS:                        10.3   6.38  )-----------( 133      ( 17 Dec 2021 23:44 )             32.2     12-17    142  |  99  |  14  ----------------------------<  109<H>  3.0<L>   |  33<H>  |  0.51    Ca    8.7      17 Dec 2021 23:44  Phos  2.3     12-17  Mg     2.0     12-17    TPro  6.1  /  Alb  3.5  /  TBili  0.9  /  DBili  x   /  AST  17  /  ALT  38  /  AlkPhos  82  12-17    PT/INR - ( 17 Dec 2021 23:44 )   PT: 13.4 sec;   INR: 1.12 ratio         PTT - ( 17 Dec 2021 23:44 )  PTT:29.8 sec      CAPILLARY BLOOD GLUCOSE      POCT Blood Glucose.: 75 mg/dL (16 Dec 2021 17:42)      RADIOLOGY & ADDITIONAL TESTS: Reviewed. MICU PROGRESS NOTE    INTERVAL HPI/OVERNIGHT EVENTS:    SUBJECTIVE:   No acute events overnight. Last night, she was transitioned to NRB from AVAPS and is on NC this morning with pCO2 54 on ABG. She denies SOB, CP, abdominal pain, diarrhea, constipation. If she continues to have good mentation this afternoon, will bed board her.    OBJECTIVE:    VITAL SIGNS:  ICU Vital Signs Last 24 Hrs  T(C): 37.2 (18 Dec 2021 04:00), Max: 37.7 (17 Dec 2021 20:00)  T(F): 99 (18 Dec 2021 04:00), Max: 99.9 (17 Dec 2021 20:00)  HR: 68 (18 Dec 2021 05:00) (61 - 87)  BP: 140/63 (18 Dec 2021 05:00) (129/60 - 197/85)  BP(mean): 91 (18 Dec 2021 05:00) (87 - 122)  ABP: --  ABP(mean): --  RR: 18 (18 Dec 2021 05:00) (12 - 34)  SpO2: 97% (18 Dec 2021 05:00) (94% - 100%)      VENTS:      I&O:    12-16 @ 07:01  -  12-17 @ 07:00  --------------------------------------------------------  IN: 100 mL / OUT: 800 mL / NET: -700 mL    12-17 @ 07:01  -  12-18 @ 05:55  --------------------------------------------------------  IN: 1393.2 mL / OUT: 850 mL / NET: 543.2 mL        PHYSICAL EXAMINATION:  General: WN/WD NAD  HEENT: PERRL, EOMI, moist mucous membranes  Neurology: A&Ox2, nonfocal, MAGANA x 4  Respiratory: (+) coarse breath sounds B/L, normal respiratory effort, no wheezes, crackles, rales  CV: RRR, S1S2, no murmurs, rubs or gallops  Abdominal: Soft, NT, ND +BS, Last BM  Extremities: No edema, + peripheral pulses  LINES:                                       MEDICATIONS:  MEDICATIONS  (STANDING):  albuterol/ipratropium for Nebulization 3 milliLiter(s) Nebulizer every 6 hours  buDESOnide    Inhalation Suspension 0.5 milliGRAM(s) Inhalation two times a day  chlorhexidine 4% Liquid 1 Application(s) Topical <User Schedule>  enoxaparin Injectable 40 milliGRAM(s) SubCutaneous daily  hydrALAZINE 25 milliGRAM(s) Oral every 8 hours  levETIRAcetam  IVPB 500 milliGRAM(s) IV Intermittent every 12 hours  piperacillin/tazobactam IVPB.. 3.375 Gram(s) IV Intermittent every 8 hours  polyethylene glycol 3350 17 Gram(s) Oral daily    MEDICATIONS  (PRN):      ALLERGIES:  Allergies    No Known Allergies    Intolerances        LABS:                        10.3   6.38  )-----------( 133      ( 17 Dec 2021 23:44 )             32.2     12-17    142  |  99  |  14  ----------------------------<  109<H>  3.0<L>   |  33<H>  |  0.51    Ca    8.7      17 Dec 2021 23:44  Phos  2.3     12-17  Mg     2.0     12-17    TPro  6.1  /  Alb  3.5  /  TBili  0.9  /  DBili  x   /  AST  17  /  ALT  38  /  AlkPhos  82  12-17    PT/INR - ( 17 Dec 2021 23:44 )   PT: 13.4 sec;   INR: 1.12 ratio         PTT - ( 17 Dec 2021 23:44 )  PTT:29.8 sec      CAPILLARY BLOOD GLUCOSE      POCT Blood Glucose.: 75 mg/dL (16 Dec 2021 17:42)      RADIOLOGY & ADDITIONAL TESTS: Reviewed.

## 2021-12-18 NOTE — CHART NOTE - NSCHARTNOTEFT_GEN_A_CORE
MICU Transfer Note  ---------------------------    Transfer from: MICU  Transfer to:  (  ) Medicine    ( x ) Telemetry    (  ) RCU    (  ) Palliative    (  ) Stroke Unit    (  ) _______________  Accepting Physician:    ***************************************************************  Kylie Angelia, PGY2  Internal Medicine   pager: NS: [phone] [facility]: [phone]  ***************************************************************      MICU COURSE      This patient is an 87yo lady with PMH of acromegaly, htn, breast CA s/p L mastectomy who presents to the ED with complaint of fatigue and bradycardia. History was provided by son at bedside. The patient has had progressive decline in activity over the last few weeks. She has been sleeping more, eating less, and ambulating slower. She has intermittent confusion about dates, however she recognizes family. Son noticed her gait is much slower. She had no fever, chills, or vomiting or nausea. She has chronic urinary incontinence.   The patient was able to use an exercise bike for 15 minutes about 3-4 days ago without complaining of chest pain or dyspnea. She only has atraumatic back pain with exertion.  The patient was seen in the [facility] ED on 12/4/2021 for abdominal pain, and discharged home with augmentin for proctocolitis.      Pt had rapid response 12/15 AM for AMS, pt was reportedly AAOx2-3 then became increasingly lethargic, yet still responsive to commands. ABG showed pH 7.28, pCO2 79, UA + many bacteria, + nitrite, moderate LE. BCx were sent, zosyn x1 given, AVAPS started, pt's mental status appeared to improve with AVAPS. Also sent procal, prolactin, CBC, CMP. 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 Neurology, Cardiology, Pulmonary consulted.    Pt had 2nd RRT on 12/15 evening for bradycardia down to upper 30's, sinus hasmukh to 45-50, BPs stable and satting well on BIPAP. EKG showed sinus hasmukh, no AV block. Kept on BIPAP. Admitted to the MICU for further management of acute hypercarbic respiratory failure and AMS. Last night, patient as transitioned from AVAPS to NRB and this morning patient is on 2L NC. She is HD stable and is being treated for E Coli UTI with zosyn (12/15 - ) and is ready to be transferred to the floors.                 To-Do:    [ ] cardiology recs  [ ] continue with zosyn for 5-7 days  [ ] monitor ABG or VBG daily whether patient     OBJECTIVE --  Vital Signs Last 24 Hrs  T(C): 37.2 (18 Dec 2021 04:00), Max: 37.7 (17 Dec 2021 20:00)  T(F): 99 (18 Dec 2021 04:00), Max: 99.9 (17 Dec 2021 20:00)  HR: 84 (18 Dec 2021 07:00) (66 - 87)  BP: 145/64 (18 Dec 2021 07:00) (129/60 - 163/73)  BP(mean): 92 (18 Dec 2021 07:00) (87 - 105)  RR: 25 (18 Dec 2021 07:00) (12 - 34)  SpO2: 95% (18 Dec 2021 07:00) (94% - 100%)    I&O's Summary    17 Dec 2021 07:01  -  18 Dec 2021 07:00  --------------------------------------------------------  IN: 1684.8 mL / OUT: 1100 mL / NET: 584.8 mL        MEDICATIONS  (STANDING):  albuterol/ipratropium for Nebulization 3 milliLiter(s) Nebulizer every 6 hours  buDESOnide    Inhalation Suspension 0.5 milliGRAM(s) Inhalation two times a day  chlorhexidine 4% Liquid 1 Application(s) Topical <User Schedule>  enoxaparin Injectable 40 milliGRAM(s) SubCutaneous daily  hydrALAZINE 25 milliGRAM(s) Oral every 8 hours  levETIRAcetam  IVPB 500 milliGRAM(s) IV Intermittent every 12 hours  piperacillin/tazobactam IVPB.. 3.375 Gram(s) IV Intermittent every 8 hours  polyethylene glycol 3350 17 Gram(s) Oral daily    MEDICATIONS  (PRN):        LABS                                            10.3                  Neurophils% (auto):   76.7   (12-17 @ 23:44):    6.38 )-----------(133          Lymphocytes% (auto):  11.4                                          32.2                   Eosinphils% (auto):   1.4      Manual%: Neutrophils x    ; Lymphocytes x    ; Eosinophils x    ; Bands%: x    ; Blasts x                                    142    |  99     |  14                  Calcium: 8.7   / iCa: x      (12-17 @ 23:44)    ----------------------------<  109       Magnesium: 2.0                              3.0     |  33     |  0.51             Phosphorous: 2.3      TPro  6.1    /  Alb  3.5    /  TBili  0.9    /  DBili  x      /  AST  17     /  ALT  38     /  AlkPhos  82     17 Dec 2021 23:44    ( 12-17 @ 23:44 )   PT: 13.4 sec;   INR: 1.12 ratio  aPTT: 29.8 sec          ASSESSMENT & PLAN:   87 y/o F with PMH of acromegaly, HTN, breast CA s/p L mastectomy. Presents to ED with c/o fatigue and bradycardia. Found to have +UA, started on abx. 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 MICU consulted for hypercapnia with decreased mentation. Repeat ABG with ongoing hypercapnia but stable pH. 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 EKG showed sinus hasmukh, no AV block.  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  - pBNP 3515  - Lasix 20 mg 12/15  - holding home Toprol  mg daily for now    #Pulmonary  - ABG during RRT 12/15 7.28, pCO2 78, pO2 126, HCO3 37; VBG later showed pH 7.17, pCO2 85, pO2 38, HCO3 31  - likely has underlying chronic hypercapnia  - brought to MICU for AVAPS, never intubated  - CT chest when able  - pulm follow for hypercapnia when downgraded from MICU    #Renal  - no active issues  - ins outs    #Infectious disease  - many bacteria on UA, + LE, moderate nitrite  - likely has sepsis 2/2 UTI, contributing to current lethargy  - f/u BCx x 2, UCx  - zosyn (12/15 - )    #Hematology/Oncology  - platelets 166 -> 134, thrombocytopenic, will CTM  - maintain active T&S    #Gastrointestinal  - passed dysphagia eval 10/16 -> ordered for diet  - monitor glucose    #Psychiatric  - No active issues    #PPx  DVT: lovenox    #Ethics  Full code  Sánchez is son, (HCP, still needs to bring forms in) [phone] MICU Transfer Note  ---------------------------    Transfer from: MICU  Transfer to:  (  ) Medicine    ( x ) Telemetry    (  ) RCU    (  ) Palliative    (  ) Stroke Unit    (  ) _______________  Accepting Physician:    ***************************************************************  Kylie Angelia, PGY2  Internal Medicine   pager: NS: [phone] [facility]: [id number]  ***************************************************************      MICU COURSE      This patient is an 87yo lady with PMH of acromegaly, htn, breast CA s/p L mastectomy who presents to the ED with complaint of fatigue and bradycardia. History was provided by son at bedside. The patient has had progressive decline in activity over the last few weeks. She has been sleeping more, eating less, and ambulating slower. She has intermittent confusion about dates, however she recognizes family. Son noticed her gait is much slower. She had no fever, chills, or vomiting or nausea. She has chronic urinary incontinence.   The patient was able to use an exercise bike for 15 minutes about 3-4 days ago without complaining of chest pain or dyspnea. She only has atraumatic back pain with exertion.  The patient was seen in the [facility] ED on 12/4/2021 for abdominal pain, and discharged home with augmentin for proctocolitis.      Pt had rapid response 12/15 AM for AMS, pt was reportedly AAOx2-3 then became increasingly lethargic, yet still responsive to commands. ABG showed pH 7.28, pCO2 79, UA + many bacteria, + nitrite, moderate LE. BCx were sent, zosyn x1 given, AVAPS started, pt's mental status appeared to improve with AVAPS. Also sent procal, prolactin, CBC, CMP. 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 Neurology, Cardiology, Pulmonary consulted.    Pt had 2nd RRT on 12/15 evening for bradycardia down to upper 30's, sinus hasmukh to 45-50, BPs stable and satting well on BIPAP. EKG showed sinus hasmukh, no AV block. Kept on BIPAP. Admitted to the MICU for further management of acute hypercarbic respiratory failure and AMS. Last night, patient as transitioned from AVAPS to NRB and this morning patient is on 2L NC. She is HD stable and is being treated for E Coli UTI with zosyn (12/15 - ) and is ready to be transferred to the floors.                 To-Do:  [ ] nightly AVAPS and PRN during the daytime  [ ] cardiology recs  [ ] continue with zosyn for 5-7 days  [ ] monitor ABG or VBG daily whether patient     OBJECTIVE --  Vital Signs Last 24 Hrs  T(C): 37.2 (18 Dec 2021 04:00), Max: 37.7 (17 Dec 2021 20:00)  T(F): 99 (18 Dec 2021 04:00), Max: 99.9 (17 Dec 2021 20:00)  HR: 84 (18 Dec 2021 07:00) (66 - 87)  BP: 145/64 (18 Dec 2021 07:00) (129/60 - 163/73)  BP(mean): 92 (18 Dec 2021 07:00) (87 - 105)  RR: 25 (18 Dec 2021 07:00) (12 - 34)  SpO2: 95% (18 Dec 2021 07:00) (94% - 100%)    I&O's Summary    17 Dec 2021 07:01  -  18 Dec 2021 07:00  --------------------------------------------------------  IN: 1684.8 mL / OUT: 1100 mL / NET: 584.8 mL        MEDICATIONS  (STANDING):  albuterol/ipratropium for Nebulization 3 milliLiter(s) Nebulizer every 6 hours  buDESOnide    Inhalation Suspension 0.5 milliGRAM(s) Inhalation two times a day  chlorhexidine 4% Liquid 1 Application(s) Topical <User Schedule>  enoxaparin Injectable 40 milliGRAM(s) SubCutaneous daily  hydrALAZINE 25 milliGRAM(s) Oral every 8 hours  levETIRAcetam  IVPB 500 milliGRAM(s) IV Intermittent every 12 hours  piperacillin/tazobactam IVPB.. 3.375 Gram(s) IV Intermittent every 8 hours  polyethylene glycol 3350 17 Gram(s) Oral daily    MEDICATIONS  (PRN):        LABS                                            10.3                  Neurophils% (auto):   76.7   (12-17 @ 23:44):    6.38 )-----------(133          Lymphocytes% (auto):  11.4                                          32.2                   Eosinphils% (auto):   1.4      Manual%: Neutrophils x    ; Lymphocytes x    ; Eosinophils x    ; Bands%: x    ; Blasts x                                    142    |  99     |  14                  Calcium: 8.7   / iCa: x      (12-17 @ 23:44)    ----------------------------<  109       Magnesium: 2.0                              3.0     |  33     |  0.51             Phosphorous: 2.3      TPro  6.1    /  Alb  3.5    /  TBili  0.9    /  DBili  x      /  AST  17     /  ALT  38     /  AlkPhos  82     17 Dec 2021 23:44    ( 12-17 @ 23:44 )   PT: 13.4 sec;   INR: 1.12 ratio  aPTT: 29.8 sec          ASSESSMENT & PLAN:   87 y/o F with PMH of acromegaly, HTN, breast CA s/p L mastectomy. Presents to ED with c/o fatigue and bradycardia. Found to have +UA, started on abx. 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 MICU consulted for hypercapnia with decreased mentation. Repeat ABG with ongoing hypercapnia but stable pH. 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 EKG showed sinus hasmukh, no AV block.  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  - pBNP 3515  - Lasix 20 mg 12/15  - holding home Toprol  mg daily for now    #Pulmonary  - ABG during RRT 12/15 7.28, pCO2 78, pO2 126, HCO3 37; VBG later showed pH 7.17, pCO2 85, pO2 38, HCO3 31  - likely has underlying chronic hypercapnia  - brought to MICU for AVAPS, never intubated  - CT chest when able  - pulm follow for hypercapnia when downgraded from MICU    #Renal  - no active issues  - ins outs    #Infectious disease  - many bacteria on UA, + LE, moderate nitrite  - likely has sepsis 2/2 UTI, contributing to current lethargy  - f/u BCx x 2, UCx  - zosyn (12/15 - )    #Hematology/Oncology  - platelets 166 -> 134, thrombocytopenic, will CTM  - maintain active T&S    #Gastrointestinal  - passed dysphagia eval 10/16 -> ordered for diet  - monitor glucose    #Psychiatric  - No active issues    #PPx  DVT: lovenox    #Ethics  Full code  Sánchez is son, (HCP, still needs to bring forms in) [phone] MICU Transfer Note  ---------------------------    Transfer from: MICU  Transfer to:  (  ) Medicine    ( x ) Telemetry    (  ) RCU    (  ) Palliative    (  ) Stroke Unit    (  ) _______________  Accepting Physician: Dr. Leger    ***************************************************************  Kylie Angelia, PGY2  Internal Medicine   pager: NS: [phone] [facility]: [id number]  ***************************************************************      MICU COURSE      This patient is an 87yo lady with PMH of acromegaly, htn, breast CA s/p L mastectomy who presents to the ED with complaint of fatigue and bradycardia. History was provided by son at bedside. The patient has had progressive decline in activity over the last few weeks. She has been sleeping more, eating less, and ambulating slower. She has intermittent confusion about dates, however she recognizes family. Son noticed her gait is much slower. She had no fever, chills, or vomiting or nausea. She has chronic urinary incontinence.   The patient was able to use an exercise bike for 15 minutes about 3-4 days ago without complaining of chest pain or dyspnea. She only has atraumatic back pain with exertion.  The patient was seen in the [facility] ED on 12/4/2021 for abdominal pain, and discharged home with augmentin for proctocolitis.      Pt had rapid response 12/15 AM for AMS, pt was reportedly AAOx2-3 then became increasingly lethargic, yet still responsive to commands. ABG showed pH 7.28, pCO2 79, UA + many bacteria, + nitrite, moderate LE. BCx were sent, zosyn x1 given, AVAPS started, pt's mental status appeared to improve with AVAPS. Also sent procal, prolactin, CBC, CMP. 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 Neurology, Cardiology, Pulmonary consulted.    Pt had 2nd RRT on 12/15 evening for bradycardia down to upper 30's, sinus hasmukh to 45-50, BPs stable and satting well on BIPAP. EKG showed sinus hasmukh, no AV block. Kept on BIPAP. Admitted to the MICU for further management of acute hypercarbic respiratory failure and AMS. Last night, patient as transitioned from AVAPS to NRB and this morning patient is on 2L NC. She is HD stable and is being treated for E Coli UTI with zosyn (12/15 - ) and is ready to be transferred to the floors.                 To-Do:  [ ] nightly AVAPS and PRN during the daytime  [ ] cardiology recs  [ ] continue with zosyn for 5-7 days for E Coli UTI  [ ] monitor ABG or VBG daily   [ ] monitor on tele for bradycardia    OBJECTIVE --  Vital Signs Last 24 Hrs  T(C): 37.2 (18 Dec 2021 04:00), Max: 37.7 (17 Dec 2021 20:00)  T(F): 99 (18 Dec 2021 04:00), Max: 99.9 (17 Dec 2021 20:00)  HR: 84 (18 Dec 2021 07:00) (66 - 87)  BP: 145/64 (18 Dec 2021 07:00) (129/60 - 163/73)  BP(mean): 92 (18 Dec 2021 07:00) (87 - 105)  RR: 25 (18 Dec 2021 07:00) (12 - 34)  SpO2: 95% (18 Dec 2021 07:00) (94% - 100%)    I&O's Summary    17 Dec 2021 07:01  -  18 Dec 2021 07:00  --------------------------------------------------------  IN: 1684.8 mL / OUT: 1100 mL / NET: 584.8 mL        MEDICATIONS  (STANDING):  albuterol/ipratropium for Nebulization 3 milliLiter(s) Nebulizer every 6 hours  buDESOnide    Inhalation Suspension 0.5 milliGRAM(s) Inhalation two times a day  chlorhexidine 4% Liquid 1 Application(s) Topical <User Schedule>  enoxaparin Injectable 40 milliGRAM(s) SubCutaneous daily  hydrALAZINE 25 milliGRAM(s) Oral every 8 hours  levETIRAcetam  IVPB 500 milliGRAM(s) IV Intermittent every 12 hours  piperacillin/tazobactam IVPB.. 3.375 Gram(s) IV Intermittent every 8 hours  polyethylene glycol 3350 17 Gram(s) Oral daily    MEDICATIONS  (PRN):        LABS                                            10.3                  Neurophils% (auto):   76.7   (12-17 @ 23:44):    6.38 )-----------(133          Lymphocytes% (auto):  11.4                                          32.2                   Eosinphils% (auto):   1.4      Manual%: Neutrophils x    ; Lymphocytes x    ; Eosinophils x    ; Bands%: x    ; Blasts x                                    142    |  99     |  14                  Calcium: 8.7   / iCa: x      (12-17 @ 23:44)    ----------------------------<  109       Magnesium: 2.0                              3.0     |  33     |  0.51             Phosphorous: 2.3      TPro  6.1    /  Alb  3.5    /  TBili  0.9    /  DBili  x      /  AST  17     /  ALT  38     /  AlkPhos  82     17 Dec 2021 23:44    ( 12-17 @ 23:44 )   PT: 13.4 sec;   INR: 1.12 ratio  aPTT: 29.8 sec          ASSESSMENT & PLAN:   87 y/o F with PMH of acromegaly, HTN, breast CA s/p L mastectomy, questionable seizure hx, ICH 6 months ago. Presents to ED with c/o fatigue and bradycardia. Found to have +UA, started on abx. 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 MICU consulted for hypercapnia with decreased mentation. Repeat ABG with ongoing hypercapnia but stable pH. Admitted to MICU for likely metabolic encephalopathy 2/2 sepsis 2/2 UTI, also likely has underlying chronic hypercapnia.    Plan  #Neuro  RRT for AMS 12/15, pt was initially AAOx2-3, became increasingly lethargic  - history of ICH 6 months ago s/p nsgy, on Keppra for seizure ppx ever since  - GCS score 11 12/15 ->15 12/16; CTH 12/14 had chronic b/l basal ganglia details  - likely metabolic encephalopathy 2/2 sepsis 2/2 UTI, also likely has some underlying chronic hypercapnia with possible acuity  - c/w Keppra 500 mg q12  - neuro checks  - neurology consulted, appreciate recs  - will plan for eventual MRI brain no con, rEEG  - holding home memantine due to side effect of bradycardia  - will send folate, b12, TSH, a1c, MMA, iron panel    #Cardiovascular  - pt was hasmukh to upper 30's during RRT on 12/15 evening, later came up 45-50. EKG showed sinus hasmukh, no AV block.  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  - pBNP 3515  - Lasix 20 mg 12/15  - holding home Toprol  mg daily for now    #Pulmonary  - ABG during RRT 12/15 7.28, pCO2 78, pO2 126, HCO3 37; VBG later showed pH 7.17, pCO2 85, pO2 38, HCO3 31  - likely has underlying chronic hypercapnia  - brought to MICU for AVAPS, never intubated  - CT chest when able  - pulm follow for hypercapnia when downgraded from MICU    #Renal  - no active issues  - ins outs    #Infectious disease  - many bacteria on UA, + LE, moderate nitrite  - likely has sepsis 2/2 UTI, contributing to current lethargy  - f/u BCx x 2, UCx  - zosyn (12/15 - )    #Hematology/Oncology  - platelets 166 -> 134, thrombocytopenic, will CTM  - maintain active T&S    #Gastrointestinal  - passed dysphagia eval 10/16 -> ordered for diet  - monitor glucose    #Psychiatric  - No active issues    #PPx  DVT: lovenox    #Ethics  Full code  Sánchez is son, (HCP, still needs to bring forms in) [phone]

## 2021-12-19 LAB
ALBUMIN SERPL ELPH-MCNC: 3.4 G/DL — SIGNIFICANT CHANGE UP (ref 3.3–5)
ALP SERPL-CCNC: 75 U/L — SIGNIFICANT CHANGE UP (ref 40–120)
ALT FLD-CCNC: 29 U/L — SIGNIFICANT CHANGE UP (ref 10–45)
ANION GAP SERPL CALC-SCNC: 11 MMOL/L — SIGNIFICANT CHANGE UP (ref 5–17)
APTT BLD: 32.6 SEC — SIGNIFICANT CHANGE UP (ref 27.5–35.5)
AST SERPL-CCNC: 16 U/L — SIGNIFICANT CHANGE UP (ref 10–40)
BASOPHILS # BLD AUTO: 0.01 K/UL — SIGNIFICANT CHANGE UP (ref 0–0.2)
BASOPHILS NFR BLD AUTO: 0.2 % — SIGNIFICANT CHANGE UP (ref 0–2)
BILIRUB SERPL-MCNC: 0.9 MG/DL — SIGNIFICANT CHANGE UP (ref 0.2–1.2)
BUN SERPL-MCNC: 9 MG/DL — SIGNIFICANT CHANGE UP (ref 7–23)
CALCIUM SERPL-MCNC: 9 MG/DL — SIGNIFICANT CHANGE UP (ref 8.4–10.5)
CHLORIDE SERPL-SCNC: 97 MMOL/L — SIGNIFICANT CHANGE UP (ref 96–108)
CO2 SERPL-SCNC: 30 MMOL/L — SIGNIFICANT CHANGE UP (ref 22–31)
CREAT SERPL-MCNC: 0.46 MG/DL — LOW (ref 0.5–1.3)
EOSINOPHIL # BLD AUTO: 0.16 K/UL — SIGNIFICANT CHANGE UP (ref 0–0.5)
EOSINOPHIL NFR BLD AUTO: 3.1 % — SIGNIFICANT CHANGE UP (ref 0–6)
GLUCOSE SERPL-MCNC: 102 MG/DL — HIGH (ref 70–99)
HCT VFR BLD CALC: 32.1 % — LOW (ref 34.5–45)
HGB BLD-MCNC: 10.4 G/DL — LOW (ref 11.5–15.5)
IMM GRANULOCYTES NFR BLD AUTO: 0.2 % — SIGNIFICANT CHANGE UP (ref 0–1.5)
INR BLD: 1.17 RATIO — HIGH (ref 0.88–1.16)
LYMPHOCYTES # BLD AUTO: 0.76 K/UL — LOW (ref 1–3.3)
LYMPHOCYTES # BLD AUTO: 14.8 % — SIGNIFICANT CHANGE UP (ref 13–44)
MAGNESIUM SERPL-MCNC: 2 MG/DL — SIGNIFICANT CHANGE UP (ref 1.6–2.6)
MCHC RBC-ENTMCNC: 23.7 PG — LOW (ref 27–34)
MCHC RBC-ENTMCNC: 32.4 GM/DL — SIGNIFICANT CHANGE UP (ref 32–36)
MCV RBC AUTO: 73.3 FL — LOW (ref 80–100)
MONOCYTES # BLD AUTO: 0.56 K/UL — SIGNIFICANT CHANGE UP (ref 0–0.9)
MONOCYTES NFR BLD AUTO: 10.9 % — SIGNIFICANT CHANGE UP (ref 2–14)
NEUTROPHILS # BLD AUTO: 3.65 K/UL — SIGNIFICANT CHANGE UP (ref 1.8–7.4)
NEUTROPHILS NFR BLD AUTO: 70.8 % — SIGNIFICANT CHANGE UP (ref 43–77)
NRBC # BLD: 0 /100 WBCS — SIGNIFICANT CHANGE UP (ref 0–0)
PHOSPHATE SERPL-MCNC: 2.7 MG/DL — SIGNIFICANT CHANGE UP (ref 2.5–4.5)
PLATELET # BLD AUTO: 131 K/UL — LOW (ref 150–400)
POTASSIUM SERPL-MCNC: 3.2 MMOL/L — LOW (ref 3.5–5.3)
POTASSIUM SERPL-SCNC: 3.2 MMOL/L — LOW (ref 3.5–5.3)
PROT SERPL-MCNC: 5.9 G/DL — LOW (ref 6–8.3)
PROTHROM AB SERPL-ACNC: 13.9 SEC — HIGH (ref 10.6–13.6)
RBC # BLD: 4.38 M/UL — SIGNIFICANT CHANGE UP (ref 3.8–5.2)
RBC # FLD: 18.6 % — HIGH (ref 10.3–14.5)
SODIUM SERPL-SCNC: 138 MMOL/L — SIGNIFICANT CHANGE UP (ref 135–145)
WBC # BLD: 5.15 K/UL — SIGNIFICANT CHANGE UP (ref 3.8–10.5)
WBC # FLD AUTO: 5.15 K/UL — SIGNIFICANT CHANGE UP (ref 3.8–10.5)

## 2021-12-19 PROCEDURE — 99233 SBSQ HOSP IP/OBS HIGH 50: CPT

## 2021-12-19 RX ORDER — POTASSIUM CHLORIDE 20 MEQ
10 PACKET (EA) ORAL
Refills: 0 | Status: COMPLETED | OUTPATIENT
Start: 2021-12-19 | End: 2021-12-19

## 2021-12-19 RX ADMIN — Medication 25 MILLIGRAM(S): at 21:27

## 2021-12-19 RX ADMIN — PIPERACILLIN AND TAZOBACTAM 25 GRAM(S): 4; .5 INJECTION, POWDER, LYOPHILIZED, FOR SOLUTION INTRAVENOUS at 21:27

## 2021-12-19 RX ADMIN — Medication 0.5 MILLIGRAM(S): at 17:19

## 2021-12-19 RX ADMIN — Medication 3 MILLILITER(S): at 11:13

## 2021-12-19 RX ADMIN — Medication 3 MILLILITER(S): at 01:20

## 2021-12-19 RX ADMIN — Medication 3 MILLILITER(S): at 06:58

## 2021-12-19 RX ADMIN — LEVETIRACETAM 500 MILLIGRAM(S): 250 TABLET, FILM COATED ORAL at 05:47

## 2021-12-19 RX ADMIN — PIPERACILLIN AND TAZOBACTAM 25 GRAM(S): 4; .5 INJECTION, POWDER, LYOPHILIZED, FOR SOLUTION INTRAVENOUS at 05:47

## 2021-12-19 RX ADMIN — Medication 100 MILLIEQUIVALENT(S): at 02:51

## 2021-12-19 RX ADMIN — Medication 100 MILLIEQUIVALENT(S): at 01:52

## 2021-12-19 RX ADMIN — POLYETHYLENE GLYCOL 3350 17 GRAM(S): 17 POWDER, FOR SOLUTION ORAL at 11:47

## 2021-12-19 RX ADMIN — ENOXAPARIN SODIUM 40 MILLIGRAM(S): 100 INJECTION SUBCUTANEOUS at 11:39

## 2021-12-19 RX ADMIN — Medication 0.5 MILLIGRAM(S): at 06:59

## 2021-12-19 RX ADMIN — CHLORHEXIDINE GLUCONATE 1 APPLICATION(S): 213 SOLUTION TOPICAL at 06:20

## 2021-12-19 RX ADMIN — Medication 100 MILLIEQUIVALENT(S): at 05:28

## 2021-12-19 RX ADMIN — PIPERACILLIN AND TAZOBACTAM 25 GRAM(S): 4; .5 INJECTION, POWDER, LYOPHILIZED, FOR SOLUTION INTRAVENOUS at 13:53

## 2021-12-19 RX ADMIN — LEVETIRACETAM 500 MILLIGRAM(S): 250 TABLET, FILM COATED ORAL at 17:17

## 2021-12-19 RX ADMIN — Medication 25 MILLIGRAM(S): at 13:53

## 2021-12-19 RX ADMIN — Medication 3 MILLILITER(S): at 23:31

## 2021-12-19 RX ADMIN — Medication 25 MILLIGRAM(S): at 05:47

## 2021-12-19 RX ADMIN — Medication 3 MILLILITER(S): at 17:19

## 2021-12-19 NOTE — PROGRESS NOTE ADULT - SUBJECTIVE AND OBJECTIVE BOX
Name of Patient : TONIA MAJANO  MRN: [id number]  Date of visit: 12-19-21 @ 23:09      Subjective: Patient seen and examined. No new events except as noted.     REVIEW OF SYSTEMS:    CONSTITUTIONAL: No weakness, fevers or chills  EYES/ENT: No visual changes;  No vertigo or throat pain   NECK: No pain or stiffness  RESPIRATORY: No cough, wheezing, hemoptysis; No shortness of breath  CARDIOVASCULAR: No chest pain or palpitations  GASTROINTESTINAL: No abdominal or epigastric pain. No nausea, vomiting, or hematemesis; No diarrhea or constipation. No melena or hematochezia.  GENITOURINARY: No dysuria, frequency or hematuria  NEUROLOGICAL: No numbness or weakness  SKIN: No itching, burning, rashes, or lesions   All other review of systems is negative unless indicated above.    MEDICATIONS:  MEDICATIONS  (STANDING):  albuterol/ipratropium for Nebulization 3 milliLiter(s) Nebulizer every 6 hours  buDESOnide    Inhalation Suspension 0.5 milliGRAM(s) Inhalation two times a day  chlorhexidine 4% Liquid 1 Application(s) Topical <User Schedule>  enoxaparin Injectable 40 milliGRAM(s) SubCutaneous daily  hydrALAZINE 25 milliGRAM(s) Oral every 8 hours  levETIRAcetam 500 milliGRAM(s) Oral two times a day  piperacillin/tazobactam IVPB.. 3.375 Gram(s) IV Intermittent every 8 hours  polyethylene glycol 3350 17 Gram(s) Oral daily      PHYSICAL EXAM:  T(C): 36.8 (12-19-21 @ 21:00), Max: 37.1 (12-19-21 @ 16:00)  HR: 85 (12-19-21 @ 21:00) (70 - 92)  BP: 164/73 (12-19-21 @ 21:00) (117/60 - 167/71)  RR: 18 (12-19-21 @ 21:00) (15 - 26)  SpO2: 97% (12-19-21 @ 21:00) (90% - 99%)  Wt(kg): --  I&O's Summary    18 Dec 2021 07:01  -  19 Dec 2021 07:00  --------------------------------------------------------  IN: 415 mL / OUT: 1050 mL / NET: -635 mL    19 Dec 2021 07:01  -  19 Dec 2021 23:09  --------------------------------------------------------  IN: 605 mL / OUT: 850 mL / NET: -245 mL          Appearance: Normal	  HEENT:  PERRLA   Lymphatic: No lymphadenopathy   Cardiovascular: Normal S1 S2, no JVD  Respiratory: normal effort , clear  Gastrointestinal:  Soft, Non-tender  Skin: No rashes,  warm to touch  Psychiatry:  Mood & affect appropriate  Musculuskeletal: No edema      All labs, Imaging and EKGs personally reviewed       12-18-21 @ 07:01  -  12-19-21 @ 07:00  --------------------------------------------------------  IN: 415 mL / OUT: 1050 mL / NET: -635 mL    12-19-21 @ 07:01  -  12-19-21 @ 23:09  --------------------------------------------------------  IN: 605 mL / OUT: 850 mL / NET: -245 mL                            10.4   5.15  )-----------( 131      ( 19 Dec 2021 00:50 )             32.1               12-19    138  |  97  |  9   ----------------------------<  102<H>  3.2<L>   |  30  |  0.46<L>    Ca    9.0      19 Dec 2021 00:50  Phos  2.7     12-19  Mg     2.0     12-19    TPro  5.9<L>  /  Alb  3.4  /  TBili  0.9  /  DBili  x   /  AST  16  /  ALT  29  /  AlkPhos  75  12-19    PT/INR - ( 19 Dec 2021 00:50 )   PT: 13.9 sec;   INR: 1.17 ratio         PTT - ( 19 Dec 2021 00:50 )  PTT:32.6 sec                 ABG - ( 18 Dec 2021 09:48 )  pH, Arterial: 7.46  pH, Blood: x     /  pCO2: 54    /  pO2: 116   / HCO3: 38    / Base Excess: 12.7  /  SaO2: 99.9

## 2021-12-19 NOTE — PROGRESS NOTE ADULT - SUBJECTIVE AND OBJECTIVE BOX
Santhosh Lopes MD  Internal Medicine Resident  [phone]    PATIENT:  TONIA MAJANO  [id number]    CHIEF COMPLAINT:  Patient is a 86y old  Female who presents with a chief complaint of bradycardia (19 Dec 2021 06:55)  REVIEW OF SYSTEMS:    OVERNIGHT:  Overnight no major    CONSTITUTIONAL: No weakness, fevers or chills  EYES/ENT: No visual changes;  No vertigo or throat pain   NECK: No pain or stiffness  RESPIRATORY: No cough, wheezing, hemoptysis; No shortness of breath  CARDIOVASCULAR: No chest pain or palpitations  GASTROINTESTINAL: No abdominal or epigastric pain. No nausea, vomiting, or hematemesis; No diarrhea or constipation. No melena or hematochezia.  GENITOURINARY: No dysuria, frequency or hematuria  NEUROLOGICAL: No numbness or weakness  SKIN: No itching, rashes    MEDICATIONS:  MEDICATIONS  (STANDING):  albuterol/ipratropium for Nebulization 3 milliLiter(s) Nebulizer every 6 hours  buDESOnide    Inhalation Suspension 0.5 milliGRAM(s) Inhalation two times a day  chlorhexidine 4% Liquid 1 Application(s) Topical <User Schedule>  enoxaparin Injectable 40 milliGRAM(s) SubCutaneous daily  hydrALAZINE 25 milliGRAM(s) Oral every 8 hours  levETIRAcetam 500 milliGRAM(s) Oral two times a day  piperacillin/tazobactam IVPB.. 3.375 Gram(s) IV Intermittent every 8 hours  polyethylene glycol 3350 17 Gram(s) Oral daily    MEDICATIONS  (PRN):      ALLERGIES:  Allergies    No Known Allergies    Intolerances        OBJECTIVE:  ICU Vital Signs Last 24 Hrs  T(C): 36.1 (19 Dec 2021 04:00), Max: 37.2 (18 Dec 2021 16:00)  T(F): 97 (19 Dec 2021 04:00), Max: 99 (18 Dec 2021 16:00)  HR: 87 (19 Dec 2021 07:00) (70 - 87)  BP: 144/66 (19 Dec 2021 07:00) (117/60 - 177/79)  BP(mean): 95 (19 Dec 2021 07:00) (81 - 114)  RR: 22 (19 Dec 2021 07:00) (15 - 38)  SpO2: 93% (19 Dec 2021 07:00) (90% - 99%)    I&O's Summary  18 Dec 2021 07:01  -  19 Dec 2021 07:00  --------------------------------------------------------  IN: 415 mL / OUT: 1050 mL / NET: -635 mL    PHYSICAL EXAMINATION:  General: WN/WD NAD  HEENT: PERRL, EOMI, moist mucous membranes  Neurology: A&Ox3, nonfocal, MAGANA x 4  Respiratory: CTA B/L, normal respiratory effort, no wheezes, crackles, rales  CV: RRR, S1S2, no murmurs, rubs or gallops  Abdominal: Soft, NT, ND +BS  Extremities: No edema, + peripheral pulses    LABS:  ABG - ( 18 Dec 2021 09:48 )  pH, Arterial: 7.46  pH, Blood: x     /  pCO2: 54    /  pO2: 116   / HCO3: 38    / Base Excess: 12.7  /  SaO2: 99.9                                    10.4   5.15  )-----------( 131      ( 19 Dec 2021 00:50 )             32.1     12-19    138  |  97  |  9   ----------------------------<  102<H>  3.2<L>   |  30  |  0.46<L>    Ca    9.0      19 Dec 2021 00:50  Phos  2.7     12-19  Mg     2.0     12-19    TPro  5.9<L>  /  Alb  3.4  /  TBili  0.9  /  DBili  x   /  AST  16  /  ALT  29  /  AlkPhos  75  12-19    LIVER FUNCTIONS - ( 19 Dec 2021 00:50 )  Alb: 3.4 g/dL / Pro: 5.9 g/dL / ALK PHOS: 75 U/L / ALT: 29 U/L / AST: 16 U/L / GGT: x           PT/INR - ( 19 Dec 2021 00:50 )   PT: 13.9 sec;   INR: 1.17 ratio         PTT - ( 19 Dec 2021 00:50 )  PTT:32.6 sec            TELEMETRY:     EKG:     IMAGING:       Santhosh Lopes MD  Internal Medicine Resident  [phone]    PATIENT:  TONIA MAJANO  [id number]    CHIEF COMPLAINT:  Patient is a 86y old  Female who presents with a chief complaint of bradycardia (19 Dec 2021 06:55)  REVIEW OF SYSTEMS:    OVERNIGHT:  Overnight no major events. AO3 this morning feels much better    CONSTITUTIONAL: No weakness, fevers or chills  EYES/ENT: No visual changes;  No vertigo or throat pain   NECK: No pain or stiffness  RESPIRATORY: No cough, wheezing, hemoptysis; No shortness of breath  CARDIOVASCULAR: No chest pain or palpitations  GASTROINTESTINAL: No abdominal or epigastric pain. No nausea, vomiting, or hematemesis; No diarrhea or constipation. No melena or hematochezia.  GENITOURINARY: No dysuria, frequency or hematuria  NEUROLOGICAL: No numbness or weakness  SKIN: No itching, rashes    MEDICATIONS:  MEDICATIONS  (STANDING):  albuterol/ipratropium for Nebulization 3 milliLiter(s) Nebulizer every 6 hours  buDESOnide    Inhalation Suspension 0.5 milliGRAM(s) Inhalation two times a day  chlorhexidine 4% Liquid 1 Application(s) Topical <User Schedule>  enoxaparin Injectable 40 milliGRAM(s) SubCutaneous daily  hydrALAZINE 25 milliGRAM(s) Oral every 8 hours  levETIRAcetam 500 milliGRAM(s) Oral two times a day  piperacillin/tazobactam IVPB.. 3.375 Gram(s) IV Intermittent every 8 hours  polyethylene glycol 3350 17 Gram(s) Oral daily    ALLERGIES:  Allergies  No Known Allergies    OBJECTIVE:  ICU Vital Signs Last 24 Hrs  T(C): 36.1 (19 Dec 2021 04:00), Max: 37.2 (18 Dec 2021 16:00)  T(F): 97 (19 Dec 2021 04:00), Max: 99 (18 Dec 2021 16:00)  HR: 87 (19 Dec 2021 07:00) (70 - 87)  BP: 144/66 (19 Dec 2021 07:00) (117/60 - 177/79)  BP(mean): 95 (19 Dec 2021 07:00) (81 - 114)  RR: 22 (19 Dec 2021 07:00) (15 - 38)  SpO2: 93% (19 Dec 2021 07:00) (90% - 99%)    I&O's Summary  18 Dec 2021 07:01  -  19 Dec 2021 07:00  --------------------------------------------------------  IN: 415 mL / OUT: 1050 mL / NET: -635 mL    PHYSICAL EXAMINATION:  General: WN/WD NAD  HEENT: PERRL, EOMI, moist mucous membranes  Neurology: A&Ox3, nonfocal, MAGANA x 4  Respiratory: CTA B/L, normal respiratory effort, no wheezes, crackles, rales  CV: RRR, S1S2, no murmurs, rubs or gallops  Abdominal: Soft, NT, ND +BS  Extremities: No edema, +peripheral pulses    LABS:  ABG - ( 18 Dec 2021 09:48 )  pH, Arterial: 7.46  pH, Blood: x     /  pCO2: 54    /  pO2: 116   / HCO3: 38    / Base Excess: 12.7  /  SaO2: 99.9                          10.4   5.15  )-----------( 131      ( 19 Dec 2021 00:50 )             32.1     12-19    138  |  97  |  9   ----------------------------<  102<H>  3.2<L>   |  30  |  0.46<L>    Ca    9.0      19 Dec 2021 00:50  Phos  2.7     12-19  Mg     2.0     12-19    TPro  5.9<L>  /  Alb  3.4  /  TBili  0.9  /  DBili  x   /  AST  16  /  ALT  29  /  AlkPhos  75  12-19    LIVER FUNCTIONS - ( 19 Dec 2021 00:50 )  Alb: 3.4 g/dL / Pro: 5.9 g/dL / ALK PHOS: 75 U/L / ALT: 29 U/L / AST: 16 U/L / GGT: x           PT/INR - ( 19 Dec 2021 00:50 )   PT: 13.9 sec;   INR: 1.17 ratio    PTT - ( 19 Dec 2021 00:50 )  PTT:32.6 sec

## 2021-12-19 NOTE — PROGRESS NOTE ADULT - ASSESSMENT
85 y/o F with PMH of acromegaly, HTN, breast CA s/p L mastectomy. Presents to ED with c/o fatigue and bradycardia. Found to have +UA, started on abx. 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 MICU consulted for hypercapnia with decreased mentation. Repeat ABG with ongoing hypercapnia but stable pH. 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 EKG showed sinus hasmukh, no AV block.  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  - pBNP 3515  - Lasix 20 mg 12/15  - holding home Toprol  mg daily for now    #Pulmonary  - ABG during RRT 12/15 7.28, pCO2 78, pO2 126, HCO3 37; VBG later showed pH 7.17, pCO2 85, pO2 38, HCO3 31  - likely has underlying chronic hypercapnia  - brought to MICU for AVAPS, never intubated  - CT chest when able  - pulm follow for hypercapnia when downgraded from MICU, will likely need AVAPS at night    #Renal  - no active issues  - ins outs    #Infectious disease  - many bacteria on UA, + LE, moderate nitrite  - likely has sepsis 2/2 UTI, contributing to current lethargy  - f/u BCx x 2, UCx  - zosyn (12/15 - )    #Hematology/Oncology  - platelets 166 -> 134, thrombocytopenic, will CTM  - maintain active T&S    #Gastrointestinal  - passed dysphagia eval 10/16 -> ordered for diet  - monitor glucose    #Psychiatric  - No active issues    #PPx  DVT: lovenox

## 2021-12-19 NOTE — PROGRESS NOTE ADULT - SUBJECTIVE AND OBJECTIVE BOX
DATE OF SERVICE: 12-19-21 @ 06:55    Subjective: Patient seen and examined. No new events except as noted.     SUBJECTIVE/ROS:    much more awake, alert  in good spirit  feels better     MEDICATIONS:  MEDICATIONS  (STANDING):  albuterol/ipratropium for Nebulization 3 milliLiter(s) Nebulizer every 6 hours  buDESOnide    Inhalation Suspension 0.5 milliGRAM(s) Inhalation two times a day  chlorhexidine 4% Liquid 1 Application(s) Topical <User Schedule>  enoxaparin Injectable 40 milliGRAM(s) SubCutaneous daily  hydrALAZINE 25 milliGRAM(s) Oral every 8 hours  levETIRAcetam 500 milliGRAM(s) Oral two times a day  piperacillin/tazobactam IVPB.. 3.375 Gram(s) IV Intermittent every 8 hours  polyethylene glycol 3350 17 Gram(s) Oral daily      PHYSICAL EXAM:  T(C): 36.1 (12-19-21 @ 04:00), Max: 37.2 (12-18-21 @ 16:00)  HR: 76 (12-19-21 @ 06:00) (70 - 84)  BP: 167/71 (12-19-21 @ 06:00) (117/60 - 177/79)  RR: 19 (12-19-21 @ 06:00) (15 - 38)  SpO2: 96% (12-19-21 @ 06:00) (90% - 99%)  Wt(kg): --  I&O's Summary    17 Dec 2021 07:01  -  18 Dec 2021 07:00  --------------------------------------------------------  IN: 1684.8 mL / OUT: 1100 mL / NET: 584.8 mL    18 Dec 2021 07:01  -  19 Dec 2021 06:55  --------------------------------------------------------  IN: 415 mL / OUT: 1050 mL / NET: -635 mL            JVP: Normal  Neck: supple  Lung: clear   CV: S1 S2 , Murmur:  Abd: soft  Ext: No edema  neuro: Awake / alert  Psych: flat affect  Skin: normal``    LABS/DATA:    CARDIAC MARKERS:                                10.4   5.15  )-----------( 131      ( 19 Dec 2021 00:50 )             32.1     12-19    138  |  97  |  9   ----------------------------<  102<H>  3.2<L>   |  30  |  0.46<L>    Ca    9.0      19 Dec 2021 00:50  Phos  2.7     12-19  Mg     2.0     12-19    TPro  5.9<L>  /  Alb  3.4  /  TBili  0.9  /  DBili  x   /  AST  16  /  ALT  29  /  AlkPhos  75  12-19    proBNP:   Lipid Profile:   HgA1c:   TSH:     TELE:  EKG:

## 2021-12-19 NOTE — CHART NOTE - NSCHARTNOTEFT_GEN_A_CORE
MAR SCREENING NOTE    MICU COURSE    This patient is an 87yo lady with PMH of acromegaly, htn, breast CA s/p L mastectomy who presents to the ED with complaint of fatigue and bradycardia. History was provided by son at bedside. The patient has had progressive decline in activity over the last few weeks. She has been sleeping more, eating less, and ambulating slower. She has intermittent confusion about dates, however she recognizes family. Son noticed her gait is much slower. She had no fever, chills, or vomiting or nausea. She has chronic urinary incontinence.   The patient was able to use an exercise bike for 15 minutes about 3-4 days ago without complaining of chest pain or dyspnea. She only has atraumatic back pain with exertion.  The patient was seen in the [facility] ED on 12/4/2021 for abdominal pain, and discharged home with augmentin for proctocolitis.    Pt had rapid response 12/15 AM for AMS, pt was reportedly AAOx2-3 then became increasingly lethargic, yet still responsive to commands. ABG showed pH 7.28, pCO2 79, UA + many bacteria, + nitrite, moderate LE. BCx were sent, zosyn x1 given, AVAPS started, pt's mental status appeared to improve with AVAPS. Also sent procal, prolactin, CBC, CMP. 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 Neurology, Cardiology, Pulmonary consulted.    Pt had 2nd RRT on 12/15 evening for bradycardia down to upper 30's, sinus hasmukh to 45-50, BPs stable and satting well on BIPAP. EKG showed sinus hasmukh, no AV block. Kept on BIPAP. Admitted to the MICU for further management of acute hypercarbic respiratory failure and AMS. Last night, patient as transitioned from AVAPS to NRB and this morning patient is on 2L NC. She is HD stable and is being treated for E Coli UTI with zosyn (12/15 - ) and is ready to be transferred to the floors.     ASSESSMENT & PLAN:   87 y/o F with PMH of acromegaly, HTN, breast CA s/p L mastectomy, questionable seizure hx, ICH 6 months ago. Presents to ED with c/o fatigue and bradycardia. Found to have +UA, started on abx. 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 MICU consulted for hypercapnia with decreased mentation. Repeat ABG with ongoing hypercapnia but stable pH. Admitted to MICU for likely metabolic encephalopathy 2/2 sepsis 2/2 UTI, also likely has underlying chronic hypercapnia.    ***  At time of transfer: patient in NAD and without complaints.  ***    ***  See ICU daily progress note and transfer note for full plan of care.  ***    To-Do:  [ ] nightly AVAPS and PRN during the daytime  [ ] cardiology recs  [ ] continue with zosyn for 5-7 days for E Coli UTI  [ ] monitor ABG or VBG daily   [ ] monitor on tele for bradycardia    MAR.

## 2021-12-19 NOTE — PROGRESS NOTE ADULT - ASSESSMENT
Hypercapnic resp failure   work up and tx as per ICU  much improved    Bradycardia  stable    HTN  stable    appreciate MICU care

## 2021-12-19 NOTE — PROGRESS NOTE ADULT - ASSESSMENT
Assessment & Plan:   85 y/o F with PMH of acromegaly, HTN, breast CA s/p L mastectomy. Presents to ED with c/o fatigue and bradycardia. Found to have +UA, started on abx. 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 MICU consulted for hypercapnia with decreased mentation. Repeat ABG with ongoing hypercapnia but stable pH. 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 EKG showed sinus hasmukh, no AV block.  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  - pBNP 3515  - Lasix 20 mg 12/15  - holding home Toprol  mg daily for now    #Pulmonary  - ABG during RRT 12/15 7.28, pCO2 78, pO2 126, HCO3 37; VBG later showed pH 7.17, pCO2 85, pO2 38, HCO3 31  - likely has underlying chronic hypercapnia  - brought to MICU for AVAPS, never intubated  - CT chest when able  - pulm follow for hypercapnia when downgraded from MICU, will likely need AVAPS at night    #Renal  - no active issues  - ins outs    #Infectious disease  - many bacteria on UA, + LE, moderate nitrite  - likely has sepsis 2/2 UTI, contributing to current lethargy  - f/u BCx x 2, UCx  - zosyn (12/15 - )    #Hematology/Oncology  - platelets 166 -> 134, thrombocytopenic, will CTM  - maintain active T&S    #Gastrointestinal  - passed dysphagia eval 10/16 -> ordered for diet  - monitor glucose    #Psychiatric  - No active issues    #PPx  DVT: lovenox    #Ethics  Full code  Sánchez is son, (HCP, still needs to bring forms in) [phone]

## 2021-12-20 LAB
ACANTHOCYTES BLD QL SMEAR: SLIGHT — SIGNIFICANT CHANGE UP
ALBUMIN SERPL ELPH-MCNC: 3.4 G/DL — SIGNIFICANT CHANGE UP (ref 3.3–5)
ALP SERPL-CCNC: 72 U/L — SIGNIFICANT CHANGE UP (ref 40–120)
ALT FLD-CCNC: 30 U/L — SIGNIFICANT CHANGE UP (ref 10–45)
ANION GAP SERPL CALC-SCNC: 10 MMOL/L — SIGNIFICANT CHANGE UP (ref 5–17)
ANION GAP SERPL CALC-SCNC: 13 MMOL/L — SIGNIFICANT CHANGE UP (ref 5–17)
ANISOCYTOSIS BLD QL: SIGNIFICANT CHANGE UP
AST SERPL-CCNC: 28 U/L — SIGNIFICANT CHANGE UP (ref 10–40)
BASE EXCESS BLDV CALC-SCNC: 12 MMOL/L — HIGH (ref -2–2)
BASOPHILS # BLD AUTO: 0.05 K/UL — SIGNIFICANT CHANGE UP (ref 0–0.2)
BASOPHILS NFR BLD AUTO: 0.9 % — SIGNIFICANT CHANGE UP (ref 0–2)
BILIRUB SERPL-MCNC: 0.7 MG/DL — SIGNIFICANT CHANGE UP (ref 0.2–1.2)
BUN SERPL-MCNC: 8 MG/DL — SIGNIFICANT CHANGE UP (ref 7–23)
BUN SERPL-MCNC: 9 MG/DL — SIGNIFICANT CHANGE UP (ref 7–23)
CA-I SERPL-SCNC: 1.23 MMOL/L — SIGNIFICANT CHANGE UP (ref 1.15–1.33)
CALCIUM SERPL-MCNC: 8.9 MG/DL — SIGNIFICANT CHANGE UP (ref 8.4–10.5)
CALCIUM SERPL-MCNC: 9 MG/DL — SIGNIFICANT CHANGE UP (ref 8.4–10.5)
CHLORIDE BLDV-SCNC: 100 MMOL/L — SIGNIFICANT CHANGE UP (ref 96–108)
CHLORIDE SERPL-SCNC: 98 MMOL/L — SIGNIFICANT CHANGE UP (ref 96–108)
CHLORIDE SERPL-SCNC: 99 MMOL/L — SIGNIFICANT CHANGE UP (ref 96–108)
CO2 BLDV-SCNC: 41 MMOL/L — HIGH (ref 22–26)
CO2 SERPL-SCNC: 30 MMOL/L — SIGNIFICANT CHANGE UP (ref 22–31)
CO2 SERPL-SCNC: 32 MMOL/L — HIGH (ref 22–31)
CREAT SERPL-MCNC: 0.52 MG/DL — SIGNIFICANT CHANGE UP (ref 0.5–1.3)
CREAT SERPL-MCNC: 0.62 MG/DL — SIGNIFICANT CHANGE UP (ref 0.5–1.3)
CULTURE RESULTS: SIGNIFICANT CHANGE UP
CULTURE RESULTS: SIGNIFICANT CHANGE UP
ELLIPTOCYTES BLD QL SMEAR: SLIGHT — SIGNIFICANT CHANGE UP
EOSINOPHIL # BLD AUTO: 0.14 K/UL — SIGNIFICANT CHANGE UP (ref 0–0.5)
EOSINOPHIL NFR BLD AUTO: 2.6 % — SIGNIFICANT CHANGE UP (ref 0–6)
GAS PNL BLDV: 138 MMOL/L — SIGNIFICANT CHANGE UP (ref 136–145)
GAS PNL BLDV: SIGNIFICANT CHANGE UP
GIANT PLATELETS BLD QL SMEAR: PRESENT — SIGNIFICANT CHANGE UP
GLUCOSE BLDV-MCNC: 106 MG/DL — HIGH (ref 70–99)
GLUCOSE SERPL-MCNC: 109 MG/DL — HIGH (ref 70–99)
GLUCOSE SERPL-MCNC: 89 MG/DL — SIGNIFICANT CHANGE UP (ref 70–99)
HCO3 BLDV-SCNC: 39 MMOL/L — HIGH (ref 22–29)
HCT VFR BLD CALC: 31.5 % — LOW (ref 34.5–45)
HCT VFR BLDA CALC: 32 % — LOW (ref 34.5–46.5)
HGB BLD CALC-MCNC: 10.8 G/DL — LOW (ref 11.7–16.1)
HGB BLD-MCNC: 10.4 G/DL — LOW (ref 11.5–15.5)
LACTATE BLDV-MCNC: 0.8 MMOL/L — SIGNIFICANT CHANGE UP (ref 0.7–2)
LYMPHOCYTES # BLD AUTO: 0.69 K/UL — LOW (ref 1–3.3)
LYMPHOCYTES # BLD AUTO: 13.3 % — SIGNIFICANT CHANGE UP (ref 13–44)
MACROCYTES BLD QL: SLIGHT — SIGNIFICANT CHANGE UP
MAGNESIUM SERPL-MCNC: 2 MG/DL — SIGNIFICANT CHANGE UP (ref 1.6–2.6)
MAGNESIUM SERPL-MCNC: 2 MG/DL — SIGNIFICANT CHANGE UP (ref 1.6–2.6)
MANUAL SMEAR VERIFICATION: SIGNIFICANT CHANGE UP
MCHC RBC-ENTMCNC: 24 PG — LOW (ref 27–34)
MCHC RBC-ENTMCNC: 33 GM/DL — SIGNIFICANT CHANGE UP (ref 32–36)
MCV RBC AUTO: 72.6 FL — LOW (ref 80–100)
METHYLMALONATE SERPL-SCNC: 181 NMOL/L — SIGNIFICANT CHANGE UP (ref 0–378)
MICROCYTES BLD QL: SLIGHT — SIGNIFICANT CHANGE UP
MONOCYTES # BLD AUTO: 0.37 K/UL — SIGNIFICANT CHANGE UP (ref 0–0.9)
MONOCYTES NFR BLD AUTO: 7.1 % — SIGNIFICANT CHANGE UP (ref 2–14)
NEUTROPHILS # BLD AUTO: 3.97 K/UL — SIGNIFICANT CHANGE UP (ref 1.8–7.4)
NEUTROPHILS NFR BLD AUTO: 76.1 % — SIGNIFICANT CHANGE UP (ref 43–77)
PCO2 BLDV: 61 MMHG — HIGH (ref 39–42)
PH BLDV: 7.41 — SIGNIFICANT CHANGE UP (ref 7.32–7.43)
PHOSPHATE SERPL-MCNC: 3.8 MG/DL — SIGNIFICANT CHANGE UP (ref 2.5–4.5)
PHOSPHATE SERPL-MCNC: 3.9 MG/DL — SIGNIFICANT CHANGE UP (ref 2.5–4.5)
PLAT MORPH BLD: ABNORMAL
PLATELET # BLD AUTO: 125 K/UL — LOW (ref 150–400)
PO2 BLDV: 68 MMHG — HIGH (ref 25–45)
POIKILOCYTOSIS BLD QL AUTO: SIGNIFICANT CHANGE UP
POLYCHROMASIA BLD QL SMEAR: SLIGHT — SIGNIFICANT CHANGE UP
POTASSIUM BLDV-SCNC: 3.5 MMOL/L — SIGNIFICANT CHANGE UP (ref 3.5–5.1)
POTASSIUM SERPL-MCNC: 3.5 MMOL/L — SIGNIFICANT CHANGE UP (ref 3.5–5.3)
POTASSIUM SERPL-MCNC: 4 MMOL/L — SIGNIFICANT CHANGE UP (ref 3.5–5.3)
POTASSIUM SERPL-SCNC: 3.5 MMOL/L — SIGNIFICANT CHANGE UP (ref 3.5–5.3)
POTASSIUM SERPL-SCNC: 4 MMOL/L — SIGNIFICANT CHANGE UP (ref 3.5–5.3)
PROT SERPL-MCNC: 6 G/DL — SIGNIFICANT CHANGE UP (ref 6–8.3)
RBC # BLD: 4.34 M/UL — SIGNIFICANT CHANGE UP (ref 3.8–5.2)
RBC # FLD: 18.5 % — HIGH (ref 10.3–14.5)
RBC BLD AUTO: ABNORMAL
SAO2 % BLDV: 95.3 % — HIGH (ref 67–88)
SARS-COV-2 RNA SPEC QL NAA+PROBE: SIGNIFICANT CHANGE UP
SCHISTOCYTES BLD QL AUTO: SIGNIFICANT CHANGE UP
SODIUM SERPL-SCNC: 141 MMOL/L — SIGNIFICANT CHANGE UP (ref 135–145)
SODIUM SERPL-SCNC: 141 MMOL/L — SIGNIFICANT CHANGE UP (ref 135–145)
SPECIMEN SOURCE: SIGNIFICANT CHANGE UP
SPECIMEN SOURCE: SIGNIFICANT CHANGE UP
TARGETS BLD QL SMEAR: SIGNIFICANT CHANGE UP
WBC # BLD: 5.22 K/UL — SIGNIFICANT CHANGE UP (ref 3.8–10.5)
WBC # FLD AUTO: 5.22 K/UL — SIGNIFICANT CHANGE UP (ref 3.8–10.5)

## 2021-12-20 PROCEDURE — 71250 CT THORAX DX C-: CPT | Mod: 26

## 2021-12-20 PROCEDURE — 93010 ELECTROCARDIOGRAM REPORT: CPT

## 2021-12-20 RX ORDER — POTASSIUM CHLORIDE 20 MEQ
20 PACKET (EA) ORAL ONCE
Refills: 0 | Status: COMPLETED | OUTPATIENT
Start: 2021-12-20 | End: 2021-12-20

## 2021-12-20 RX ADMIN — Medication 25 MILLIGRAM(S): at 21:52

## 2021-12-20 RX ADMIN — Medication 0.5 MILLIGRAM(S): at 06:00

## 2021-12-20 RX ADMIN — PIPERACILLIN AND TAZOBACTAM 25 GRAM(S): 4; .5 INJECTION, POWDER, LYOPHILIZED, FOR SOLUTION INTRAVENOUS at 05:33

## 2021-12-20 RX ADMIN — Medication 3 MILLILITER(S): at 05:33

## 2021-12-20 RX ADMIN — Medication 3 MILLILITER(S): at 11:37

## 2021-12-20 RX ADMIN — Medication 25 MILLIGRAM(S): at 15:16

## 2021-12-20 RX ADMIN — PIPERACILLIN AND TAZOBACTAM 25 GRAM(S): 4; .5 INJECTION, POWDER, LYOPHILIZED, FOR SOLUTION INTRAVENOUS at 15:15

## 2021-12-20 RX ADMIN — PIPERACILLIN AND TAZOBACTAM 25 GRAM(S): 4; .5 INJECTION, POWDER, LYOPHILIZED, FOR SOLUTION INTRAVENOUS at 21:52

## 2021-12-20 RX ADMIN — LEVETIRACETAM 500 MILLIGRAM(S): 250 TABLET, FILM COATED ORAL at 17:23

## 2021-12-20 RX ADMIN — LEVETIRACETAM 500 MILLIGRAM(S): 250 TABLET, FILM COATED ORAL at 05:34

## 2021-12-20 RX ADMIN — POLYETHYLENE GLYCOL 3350 17 GRAM(S): 17 POWDER, FOR SOLUTION ORAL at 11:37

## 2021-12-20 RX ADMIN — Medication 25 MILLIGRAM(S): at 05:34

## 2021-12-20 RX ADMIN — Medication 0.5 MILLIGRAM(S): at 17:23

## 2021-12-20 RX ADMIN — CHLORHEXIDINE GLUCONATE 1 APPLICATION(S): 213 SOLUTION TOPICAL at 11:39

## 2021-12-20 RX ADMIN — Medication 3 MILLILITER(S): at 17:23

## 2021-12-20 RX ADMIN — Medication 20 MILLIEQUIVALENT(S): at 05:33

## 2021-12-20 RX ADMIN — ENOXAPARIN SODIUM 40 MILLIGRAM(S): 100 INJECTION SUBCUTANEOUS at 11:37

## 2021-12-20 NOTE — PROGRESS NOTE ADULT - ASSESSMENT
Hypercapnic resp failure   work up and tx as per ICU  much improved    episode of PAT  stable     Bradycardia  stable    HTN  stable

## 2021-12-20 NOTE — PROGRESS NOTE ADULT - SUBJECTIVE AND OBJECTIVE BOX
Follow-up Pulm Progress Note    AVAPS worn overnight    Pt alert, oriented  No c/o SOB, CP  Sats 97% 2LNC     Medications:  MEDICATIONS  (STANDING):  albuterol/ipratropium for Nebulization 3 milliLiter(s) Nebulizer every 6 hours  buDESOnide    Inhalation Suspension 0.5 milliGRAM(s) Inhalation two times a day  chlorhexidine 4% Liquid 1 Application(s) Topical <User Schedule>  enoxaparin Injectable 40 milliGRAM(s) SubCutaneous daily  hydrALAZINE 25 milliGRAM(s) Oral every 8 hours  levETIRAcetam 500 milliGRAM(s) Oral two times a day  piperacillin/tazobactam IVPB.. 3.375 Gram(s) IV Intermittent every 8 hours  polyethylene glycol 3350 17 Gram(s) Oral daily        Vital Signs Last 24 Hrs  T(C): 36.7 (20 Dec 2021 10:20), Max: 37.1 (19 Dec 2021 16:00)  T(F): 98.1 (20 Dec 2021 10:20), Max: 98.7 (19 Dec 2021 16:00)  HR: 79 (20 Dec 2021 10:20) (69 - 88)  BP: 123/66 (20 Dec 2021 10:20) (122/57 - 164/73)  BP(mean): 91 (19 Dec 2021 17:00) (82 - 99)  RR: 17 (20 Dec 2021 10:20) (16 - 25)  SpO2: 97% (20 Dec 2021 10:20) (90% - 98%)      VBG pH 7.41 12-20 @ 02:31    VBG pCO2 61 12-20 @ 02:31    VBG O2 sat 95.3 12-20 @ 02:31    VBG lactate 0.8 12-20 @ 02:31      12-19 @ 07:01  -  12-20 @ 07:00  --------------------------------------------------------  IN: 1165 mL / OUT: 1250 mL / NET: -85 mL          LABS:                        10.4   5.22  )-----------( 125      ( 20 Dec 2021 08:54 )             31.5     12-20    141  |  98  |  8   ----------------------------<  89  4.0   |  30  |  0.52    Ca    8.9      20 Dec 2021 08:54  Phos  3.9     12-20  Mg     2.0     12-20    TPro  6.0  /  Alb  3.4  /  TBili  0.7  /  DBili  x   /  AST  28  /  ALT  30  /  AlkPhos  72  12-20        PT/INR - ( 19 Dec 2021 00:50 )   PT: 13.9 sec;   INR: 1.17 ratio         PTT - ( 19 Dec 2021 00:50 )  PTT:32.6 sec            CULTURES:     Culture - Blood (collected 12-15-21 @ 18:57)  Source: .Blood Blood-Peripheral  Preliminary Report (12-16-21 @ 19:03):    No growth to date.    Culture - Blood (collected 12-15-21 @ 08:55)  Source: .Blood Blood-Venous  Final Report (12-20-21 @ 09:00):    No Growth Final        Culture - Urine (collected 12-15-21 @ 09:17)  Source: Clean Catch Clean Catch (Midstream)  Final Report (12-18-21 @ 10:16):    >100,000 CFU/ml Escherichia coli  Organism: Escherichia coli (12-18-21 @ 10:16)  Organism: Escherichia coli (12-18-21 @ 10:16)      -  Amikacin: S <=16      -  Amoxicillin/Clavulanic Acid: S <=8/4      -  Ampicillin: R >16 These ampicillin results predict results for amoxicillin      -  Ampicillin/Sulbactam: S 8/4 Enterobacter, Klebsiella aerogenes, Citrobacter, and Serratia may develop resistance during prolonged therapy (3-4 days)      -  Aztreonam: S <=4      -  Cefazolin: S <=2 (MIC_CL_COM_ENTERIC_CEFAZU) For uncomplicated UTI with K. pneumoniae, E. coli, or P. mirablis: MARY <=16 is sensitive and MARY >=32 is resistant. This also predicts results for oral agents cefaclor, cefdinir, cefpodoxime, cefprozil, cefuroxime axetil, cephalexin and locarbef for uncomplicated UTI. Note that some isolates may be susceptible to these agents while testing resistant to cefazolin.      -  Cefepime: S <=2      -  Cefoxitin: S <=8      -  Ceftriaxone: S <=1 Enterobacter, Klebsiella aerogenes, Citrobacter, and Serratia may develop resistance during prolonged therapy      -  Ciprofloxacin: R >2      -  Ertapenem: S <=0.5      -  Gentamicin: S <=2      -  Imipenem: S <=1      -  Levofloxacin: R >4      -  Meropenem: S <=1      -  Nitrofurantoin: S <=32 Should not be used to treat pyelonephritis      -  Piperacillin/Tazobactam: S <=8      -  Tigecycline: S <=2      -  Tobramycin: S <=2      -  Trimethoprim/Sulfamethoxazole: S <=0.5/9.5      Method Type: MARY              Physical Examination:  PULM: Clear to auscultation bilaterally, no significant sputum production  CVS: S1, S2 heard      RADIOLOGY REVIEWED  CXR 12/16: small b/l pl effusions

## 2021-12-20 NOTE — CHART NOTE - NSCHARTNOTEFT_GEN_A_CORE
TONIA MAJANO  [id number]      >>>Overnight Events<<<<<  Notified by RN for 4 beats of wide complex tachycardia on tele. Asymptomatic.    Vital Signs Last 24 Hrs  T(C): 36.7 (20 Dec 2021 01:34), Max: 37.1 (19 Dec 2021 16:00)  T(F): 98.1 (20 Dec 2021 01:34), Max: 98.7 (19 Dec 2021 16:00)  HR: 80 (20 Dec 2021 01:34) (72 - 92)  BP: 128/72 (20 Dec 2021 01:34) (117/60 - 167/71)  BP(mean): 91 (19 Dec 2021 17:00) (82 - 108)  RR: 18 (20 Dec 2021 01:34) (15 - 25)  SpO2: 96% (20 Dec 2021 01:34) (90% - 99%)      Labs:                          10.4   5.15  )-----------( 131      ( 19 Dec 2021 00:50 )             32.1     12-19    138  |  97  |  9   ----------------------------<  102<H>  3.2<L>   |  30  |  0.46<L>    Ca    9.0      19 Dec 2021 00:50  Phos  2.7     12-19  Mg     2.0     12-19    TPro  5.9<L>  /  Alb  3.4  /  TBili  0.9  /  DBili  x   /  AST  16  /  ALT  29  /  AlkPhos  75  12-19    ABG - ( 18 Dec 2021 09:48 )  pH, Arterial: 7.46  pH, Blood: x     /  pCO2: 54    /  pO2: 116   / HCO3: 38    / Base Excess: 12.7  /  SaO2: 99.9          Assessment & Plan:  HPI:  This patient is an 85yo lady with PMH of acromegaly, htn, breast CA s/p L mastectomy who presents to the ED with complaint of fatigue and bradycardia. History was provided by son at bedside. The patient has had progressive decline in activity over the last few weeks. She has been sleeping more, eating less, and ambulating slower. She has intermittent confusion about dates, however she recognizes family. Son noticed her gait is much slower.  She has chronic urinary incontinence.  The patient was seen in the [facility] ED on 12/4/2021 for abdominal pain, and discharged home with augmentin for proctocolitis. Now is noted having 4 beats of WCT on telemetry.      # 4 beats of WCT    - Asymptomatic    - hemodynamically stable    - Baseline rhythm SB-SR  with HR in 50-80's on tele    - EKG shows no acute ST/T changes from previous ones    - Electrolytes stat    - Keep K > 3, Mg > 2, Phos > 3    - Will closely monitor on tele, clinical status, and vitals    - Will endorse to primary team in AM      Abdi Liang [facility] #[id number]  Dept of Medicine

## 2021-12-20 NOTE — PROGRESS NOTE ADULT - PROBLEM SELECTOR PLAN 1
?acute on chronic hypercapnia. 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 Eventual MR head?  -On abx for UTI  -Wean O2 as tolerated, keep sats >90%   -C/w AVAPS qHS for now , min pressure 15, max pressure 35, EPAP 8, back up rate 14 FiO2 40%. May need NIV at home, will need to determine etiology of hypercapnia in attempt to get machine approved.  -CT chest non contrast ordered ?acute on chronic hypercapnia. 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 Eventual MR head?  -On abx for UTI  -Wean O2 as tolerated, keep sats >90%. Continuous pulse ox.   -C/w bronchodilators for now  -ABGs improved. Observe off AVAPS tonight, check ABG in AM.   -CT chest non contrast ordered ?acute on chronic hypercapnia. 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 Eventual MR head?  -On abx for UTI  -Wean O2 as tolerated, keep sats >90%. Continuous pulse ox.   -C/w bronchodilators for now  -Check bedside spirometry   -ABGs improved. Observe off AVAPS tonight, unless change in respiratory status, check ABG in AM.   -CT chest non contrast ordered ?acute on chronic hypercapnia. 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 Eventual MR head?  -On abx for UTI  -Wean O2 as tolerated, keep sats >90%. Continuous pulse ox.   -C/w bronchodilators for now  -Check bedside spirometry (need updated COVID PCR)  -ABGs improved. Observe off AVAPS tonight, unless change in respiratory status, check ABG in AM.   -CT chest non contrast ordered

## 2021-12-20 NOTE — PROGRESS NOTE ADULT - ASSESSMENT
Patient is an 85 y/o F with PMH of acromegaly, HTN, breast CA s/p L mastectomy. Presents to ED with c/o fatigue and bradycardia. Found to have +UA, started on abx. 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 MICU consulted for hypercapnia with decreased mentation. Repeat ABG with ongoing hypercapnia but stable pH. 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, on Keppra for seizure ppx ever since  - GCS score 11 12/15 ->15 12/16; CTH 12/14 had chronic b/l basal ganglia details  - likely metabolic encephalopathy 2/2 sepsis 2/2 UTI, also likely has some underlying chronic hypercapnia with possible acuity  - c/w Keppra 500 mg q12  - neuro checks  - neurology consulted, appreciate recs  - will plan for eventual MRI brain no con, rEEG  - holding home memantine due to side effect of bradycardia  - will send folate, b12, TSH, a1c, MMA, iron panel; F/U results   -- Patient will need eventual MR Brain     #Bradycardia   - pt was hasmukh to upper 30's during RRT on 12/15 evening, later came up 45-50. EKG showed sinus hasmukh, no AV block.  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   --Has resolved      #Acute Respiratory failure with hypercapnia   --? Acute on chronic hypercapnia or Secondary to underlying infectious cause   --Pt has history of ISA, has not used CPAP in >10 years per pulmonary   --Pulmonary is on board; appreciate their care; F/U recommendations   --ABG during RRT 12/15 7.28, pCO2 78, pO2 126, HCO3 37; VBG later showed pH 7.17, pCO2 85, pO2 38, HCO3 31  -- Juan has underlying chronic hypercapnia  --S/P MICU now on floor   --Pulmonary is on board; appreciate their care; F/U recommendations   --Per pulmonary, maintain continuous pulse ox, keep O2 > 90%, wean O2 as able to, continue with ABx for UTI, bronchodilators, check bedside spirometry, Observe off of AVAPs tonight 12/20 unless change in respiratory status  --Check ABG in AM  --F/U CT Chest non-contrast as ordered by pulmonology         #Sepsis due to UTI  --Many bacteria on UA, + LE, moderate nitrite  --Monitor CBC and temperature curve   --Likely contributing to current lethargy  --Blood culture X2 with no growth  --Urine culture is + for E. coli, continue with current Abx   --Continue with Zosyn   - zosyn (12/15 - )    #Thrombocytopenia   --platelets 166 -> 125  - maintain active T&S    #Gastrointestinal  - passed dysphagia eval 10/16 -> ordered for diet  - monitor glucose        #PPx  DVT: lovenox   Patient is an 85 y/o F with PMH of acromegaly, HTN, breast CA s/p L mastectomy. Presents to ED with c/o fatigue and bradycardia. Found to have +UA, started on abx. 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 MICU consulted for hypercapnia with decreased mentation. Repeat ABG with ongoing hypercapnia but stable pH. 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, on Keppra for seizure ppx ever since  - GCS score 11 12/15 ->15 12/16; CTH 12/14 had chronic b/l basal ganglia details  - likely metabolic encephalopathy 2/2 sepsis 2/2 UTI, also likely has some underlying chronic hypercapnia with possible acuity  - c/w Keppra 500 mg q12  - neuro checks  - neurology consulted, appreciate recs  - will plan for eventual MRI brain no con, rEEG  - holding home memantine due to side effect of bradycardia  - will send folate, b12, TSH, a1c, MMA, iron panel; F/U results   -- Patient will need eventual MR Brain     #Bradycardia   - pt was hasmukh to upper 30's during RRT on 12/15 evening, later came up 45-50. EKG showed sinus hasmukh, no AV block.  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   --Has resolved      #Acute Respiratory failure with hypercapnia   --? Acute on chronic hypercapnia or Secondary to underlying infectious cause   --Pt has history of ISA, has not used CPAP in >10 years per pulmonary   --Pulmonary is on board; appreciate their care; F/U recommendations   --ABG during RRT 12/15 7.28, pCO2 78, pO2 126, HCO3 37; VBG later showed pH 7.17, pCO2 85, pO2 38, HCO3 31  -- Juan has underlying chronic hypercapnia  --S/P MICU now on floor   --Pulmonary is on board; appreciate their care; F/U recommendations   --Per pulmonary, maintain continuous pulse ox, keep O2 > 90%, wean O2 as able to, continue with ABx for UTI, bronchodilators, check bedside spirometry, Observe off of AVAPs tonight 12/20 unless change in respiratory status  --Check ABG in AM  --F/U CT Chest non-contrast as ordered by pulmonology       #Sepsis due to UTI  --Many bacteria on UA, + LE, moderate nitrite  --Monitor CBC and temperature curve   --Likely contributing to current lethargy  --Blood culture X2 with no growth  --Urine culture is + for E. coli, continue with current Abx   --Continue with Zosyn   - zosyn (12/15 - )    #Thrombocytopenia   --platelets 166 -> 125  - maintain active T&S  --Monitor CBC    #HTN  --Holding home  metoprolol in the setting of bradycardia   --Cardiology on board; F/U recommendations  --On Hydralazine 25, will continue   --Continue with close monitoring of BP and adjust medications as needed      #History of acromegaly  --Patient's PCP Dr. Julian and Dr. Paulino  --Continue Keppra 500 BID  --Seizure prophylaxis       - passed dysphagia eval 10/16 -> ordered for diet  - monitor glucose        #PPx  DVT: lovenox

## 2021-12-20 NOTE — PROGRESS NOTE ADULT - SUBJECTIVE AND OBJECTIVE BOX
DATE OF SERVICE: 12-20-21 @ 11:00    Subjective: Patient seen and examined. No new events except as noted.     SUBJECTIVE/ROS:  nad      MEDICATIONS:  MEDICATIONS  (STANDING):  albuterol/ipratropium for Nebulization 3 milliLiter(s) Nebulizer every 6 hours  buDESOnide    Inhalation Suspension 0.5 milliGRAM(s) Inhalation two times a day  chlorhexidine 4% Liquid 1 Application(s) Topical <User Schedule>  enoxaparin Injectable 40 milliGRAM(s) SubCutaneous daily  hydrALAZINE 25 milliGRAM(s) Oral every 8 hours  levETIRAcetam 500 milliGRAM(s) Oral two times a day  piperacillin/tazobactam IVPB.. 3.375 Gram(s) IV Intermittent every 8 hours  polyethylene glycol 3350 17 Gram(s) Oral daily      PHYSICAL EXAM:  T(C): 36.7 (12-20-21 @ 10:20), Max: 37.1 (12-19-21 @ 16:00)  HR: 79 (12-20-21 @ 10:20) (69 - 85)  BP: 123/66 (12-20-21 @ 10:20) (122/57 - 164/73)  RR: 17 (12-20-21 @ 10:20) (17 - 25)  SpO2: 97% (12-20-21 @ 10:20) (91% - 98%)  Wt(kg): --  I&O's Summary    19 Dec 2021 07:01  -  20 Dec 2021 07:00  --------------------------------------------------------  IN: 1165 mL / OUT: 1250 mL / NET: -85 mL    20 Dec 2021 07:01  -  20 Dec 2021 11:00  --------------------------------------------------------  IN: 220 mL / OUT: 250 mL / NET: -30 mL            JVP: Normal  Neck: supple  Lung: clear   CV: S1 S2 , Murmur:  Abd: soft  Ext: No edema  neuro: Awake / alert  Psych: flat affect  Skin: normal``    LABS/DATA:    CARDIAC MARKERS:                                10.4   5.22  )-----------( 125      ( 20 Dec 2021 08:54 )             31.5     12-20    141  |  98  |  8   ----------------------------<  89  4.0   |  30  |  0.52    Ca    8.9      20 Dec 2021 08:54  Phos  3.9     12-20  Mg     2.0     12-20    TPro  6.0  /  Alb  3.4  /  TBili  0.7  /  DBili  x   /  AST  28  /  ALT  30  /  AlkPhos  72  12-20    proBNP:   Lipid Profile:   HgA1c:   TSH:     TELE:  EKG:

## 2021-12-20 NOTE — PROGRESS NOTE ADULT - SUBJECTIVE AND OBJECTIVE BOX
Name of Patient : TONIA MAJANO  MRN: [id number]  Date of visit: 12-20-21 @ 16:18      Subjective: Patient seen and examined. No new events except as noted.   Patient seen sitting in chair with incentive spirometer. States she is doing okay.   Patient had 4 beats of wide complex tachycardia overnight, PAT for 3.4 seconds with HR up to 148. Denies chest pain.   Patient is saturating 98% on 2L NC.     REVIEW OF SYSTEMS:    CONSTITUTIONAL: No weakness, fevers or chills  EYES/ENT: No visual changes;  No vertigo or throat pain   NECK: No pain or stiffness  RESPIRATORY: No cough, wheezing, hemoptysis; No shortness of breath  CARDIOVASCULAR: + PAT overnight for 3.4 seconds   GASTROINTESTINAL: No abdominal or epigastric pain. No nausea, vomiting, or hematemesis; No diarrhea or constipation. No melena or hematochezia.  GENITOURINARY: No dysuria, frequency or hematuria  NEUROLOGICAL: No numbness or weakness  SKIN: No itching, burning, rashes, or lesions   All other review of systems is negative unless indicated above.    MEDICATIONS:  MEDICATIONS  (STANDING):  albuterol/ipratropium for Nebulization 3 milliLiter(s) Nebulizer every 6 hours  buDESOnide    Inhalation Suspension 0.5 milliGRAM(s) Inhalation two times a day  chlorhexidine 4% Liquid 1 Application(s) Topical <User Schedule>  enoxaparin Injectable 40 milliGRAM(s) SubCutaneous daily  hydrALAZINE 25 milliGRAM(s) Oral every 8 hours  levETIRAcetam 500 milliGRAM(s) Oral two times a day  piperacillin/tazobactam IVPB.. 3.375 Gram(s) IV Intermittent every 8 hours  polyethylene glycol 3350 17 Gram(s) Oral daily      PHYSICAL EXAM:  T(C): 36.4 (12-20-21 @ 13:17), Max: 36.8 (12-19-21 @ 21:00)  HR: 80 (12-20-21 @ 15:32) (69 - 93)  BP: 118/64 (12-20-21 @ 13:17) (118/64 - 164/73)  RR: 17 (12-20-21 @ 13:17) (17 - 18)  SpO2: 98% (12-20-21 @ 15:32) (91% - 98%)  Wt(kg): --  I&O's Summary    19 Dec 2021 07:01  -  20 Dec 2021 07:00  --------------------------------------------------------  IN: 1165 mL / OUT: 1250 mL / NET: -85 mL    20 Dec 2021 07:01  -  20 Dec 2021 16:18  --------------------------------------------------------  IN: 440 mL / OUT: 250 mL / NET: 190 mL          Appearance: Normal	  HEENT:  PERRLA   Lymphatic: No lymphadenopathy   Cardiovascular: Normal S1 S2, no JVD  Respiratory: normal effort , clear  Gastrointestinal:  Soft, Non-tender  Skin: No rashes,  warm to touch  Psychiatry:  Mood & affect appropriate  Musculoskeletal: No edema            12-19-21 @ 07:01  -  12-20-21 @ 07:00  --------------------------------------------------------  IN: 1165 mL / OUT: 1250 mL / NET: -85 mL    12-20-21 @ 07:01  -  12-20-21 @ 16:18  --------------------------------------------------------  IN: 440 mL / OUT: 250 mL / NET: 190 mL                              10.4   5.22  )-----------( 125      ( 20 Dec 2021 08:54 )             31.5               12-20    141  |  98  |  8   ----------------------------<  89  4.0   |  30  |  0.52    Ca    8.9      20 Dec 2021 08:54  Phos  3.9     12-20  Mg     2.0     12-20    TPro  6.0  /  Alb  3.4  /  TBili  0.7  /  DBili  x   /  AST  28  /  ALT  30  /  AlkPhos  72  12-20    PT/INR - ( 19 Dec 2021 00:50 )   PT: 13.9 sec;   INR: 1.17 ratio         PTT - ( 19 Dec 2021 00:50 )  PTT:32.6 sec                           Culture - Blood (12.15.21 @ 18:57)   Specimen Source: .Blood Blood-Peripheral   Culture Results: No growth to date.     Specimen Source: Clean Catch Clean Catch (Midstream)   Culture Results: >100,000 CFU/ml Escherichia coli   Organism Identification: Escherichia coli   Organism: Escherichia coli     Culture - Blood (12.15.21 @ 08:55)   Specimen Source: .Blood Blood-Venous   Culture Results: No Growth Final

## 2021-12-21 DIAGNOSIS — J44.9 CHRONIC OBSTRUCTIVE PULMONARY DISEASE, UNSPECIFIED: ICD-10-CM

## 2021-12-21 DIAGNOSIS — J96.22 ACUTE AND CHRONIC RESPIRATORY FAILURE WITH HYPERCAPNIA: ICD-10-CM

## 2021-12-21 LAB
ANION GAP SERPL CALC-SCNC: 9 MMOL/L — SIGNIFICANT CHANGE UP (ref 5–17)
BASE EXCESS BLDA CALC-SCNC: 11.4 MMOL/L — HIGH (ref -2–3)
BUN SERPL-MCNC: 8 MG/DL — SIGNIFICANT CHANGE UP (ref 7–23)
CALCIUM SERPL-MCNC: 8.9 MG/DL — SIGNIFICANT CHANGE UP (ref 8.4–10.5)
CHLORIDE SERPL-SCNC: 99 MMOL/L — SIGNIFICANT CHANGE UP (ref 96–108)
CO2 BLDA-SCNC: 40 MMOL/L — HIGH (ref 19–24)
CO2 SERPL-SCNC: 32 MMOL/L — HIGH (ref 22–31)
CREAT SERPL-MCNC: 0.55 MG/DL — SIGNIFICANT CHANGE UP (ref 0.5–1.3)
GAS PNL BLDA: SIGNIFICANT CHANGE UP
GLUCOSE SERPL-MCNC: 103 MG/DL — HIGH (ref 70–99)
HCO3 BLDA-SCNC: 38 MMOL/L — HIGH (ref 21–28)
HCT VFR BLD CALC: 31 % — LOW (ref 34.5–45)
HGB BLD-MCNC: 10.2 G/DL — LOW (ref 11.5–15.5)
HOROWITZ INDEX BLDA+IHG-RTO: 28 — SIGNIFICANT CHANGE UP
MCHC RBC-ENTMCNC: 24.1 PG — LOW (ref 27–34)
MCHC RBC-ENTMCNC: 32.9 GM/DL — SIGNIFICANT CHANGE UP (ref 32–36)
MCV RBC AUTO: 73.1 FL — LOW (ref 80–100)
NRBC # BLD: 0 /100 WBCS — SIGNIFICANT CHANGE UP (ref 0–0)
PCO2 BLDA: 59 MMHG — HIGH (ref 32–45)
PH BLDA: 7.42 — SIGNIFICANT CHANGE UP (ref 7.35–7.45)
PLATELET # BLD AUTO: 155 K/UL — SIGNIFICANT CHANGE UP (ref 150–400)
PO2 BLDA: 108 MMHG — SIGNIFICANT CHANGE UP (ref 83–108)
POTASSIUM SERPL-MCNC: 3.7 MMOL/L — SIGNIFICANT CHANGE UP (ref 3.5–5.3)
POTASSIUM SERPL-SCNC: 3.7 MMOL/L — SIGNIFICANT CHANGE UP (ref 3.5–5.3)
RBC # BLD: 4.24 M/UL — SIGNIFICANT CHANGE UP (ref 3.8–5.2)
RBC # FLD: 18.6 % — HIGH (ref 10.3–14.5)
SAO2 % BLDA: 99.3 % — HIGH (ref 94–98)
SARS-COV-2 RNA SPEC QL NAA+PROBE: SIGNIFICANT CHANGE UP
SODIUM SERPL-SCNC: 140 MMOL/L — SIGNIFICANT CHANGE UP (ref 135–145)
WBC # BLD: 5.85 K/UL — SIGNIFICANT CHANGE UP (ref 3.8–10.5)
WBC # FLD AUTO: 5.85 K/UL — SIGNIFICANT CHANGE UP (ref 3.8–10.5)

## 2021-12-21 RX ORDER — TIOTROPIUM BROMIDE AND OLODATEROL 3.124; 2.736 UG/1; UG/1
2 SPRAY, METERED RESPIRATORY (INHALATION) DAILY
Refills: 0 | Status: DISCONTINUED | OUTPATIENT
Start: 2021-12-21 | End: 2021-12-27

## 2021-12-21 RX ADMIN — Medication 25 MILLIGRAM(S): at 14:07

## 2021-12-21 RX ADMIN — Medication 3 MILLILITER(S): at 05:29

## 2021-12-21 RX ADMIN — Medication 25 MILLIGRAM(S): at 05:29

## 2021-12-21 RX ADMIN — POLYETHYLENE GLYCOL 3350 17 GRAM(S): 17 POWDER, FOR SOLUTION ORAL at 14:08

## 2021-12-21 RX ADMIN — LEVETIRACETAM 500 MILLIGRAM(S): 250 TABLET, FILM COATED ORAL at 18:05

## 2021-12-21 RX ADMIN — Medication 3 MILLILITER(S): at 18:06

## 2021-12-21 RX ADMIN — Medication 3 MILLILITER(S): at 14:08

## 2021-12-21 RX ADMIN — CHLORHEXIDINE GLUCONATE 1 APPLICATION(S): 213 SOLUTION TOPICAL at 13:11

## 2021-12-21 RX ADMIN — LEVETIRACETAM 500 MILLIGRAM(S): 250 TABLET, FILM COATED ORAL at 05:29

## 2021-12-21 RX ADMIN — Medication 25 MILLIGRAM(S): at 21:49

## 2021-12-21 RX ADMIN — PIPERACILLIN AND TAZOBACTAM 25 GRAM(S): 4; .5 INJECTION, POWDER, LYOPHILIZED, FOR SOLUTION INTRAVENOUS at 21:49

## 2021-12-21 RX ADMIN — PIPERACILLIN AND TAZOBACTAM 25 GRAM(S): 4; .5 INJECTION, POWDER, LYOPHILIZED, FOR SOLUTION INTRAVENOUS at 14:08

## 2021-12-21 RX ADMIN — Medication 0.5 MILLIGRAM(S): at 05:30

## 2021-12-21 RX ADMIN — ENOXAPARIN SODIUM 40 MILLIGRAM(S): 100 INJECTION SUBCUTANEOUS at 14:07

## 2021-12-21 RX ADMIN — PIPERACILLIN AND TAZOBACTAM 25 GRAM(S): 4; .5 INJECTION, POWDER, LYOPHILIZED, FOR SOLUTION INTRAVENOUS at 05:30

## 2021-12-21 RX ADMIN — Medication 3 MILLILITER(S): at 00:22

## 2021-12-21 NOTE — PROGRESS NOTE ADULT - ASSESSMENT
Hypercapnic resp failure   work up and tx as per ICU  much improved  pulm eval     episode of PAT  stable     Bradycardia  stable    HTN  stable  cont current meds

## 2021-12-21 NOTE — PROGRESS NOTE ADULT - SUBJECTIVE AND OBJECTIVE BOX
Name of Patient : TONIA MAJANO  MRN: [id number]  Date of visit: 12-21-21 @ 09:50      Subjective: Patient seen and examined. No new events except as noted.     REVIEW OF SYSTEMS:    CONSTITUTIONAL: No weakness, fevers or chills  EYES/ENT: No visual changes;  No vertigo or throat pain   NECK: No pain or stiffness  RESPIRATORY: No cough, wheezing, hemoptysis; No shortness of breath  CARDIOVASCULAR: No chest pain or palpitations  GASTROINTESTINAL: No abdominal or epigastric pain. No nausea, vomiting, or hematemesis; No diarrhea or constipation. No melena or hematochezia.  GENITOURINARY: No dysuria, frequency or hematuria  NEUROLOGICAL: No numbness or weakness  SKIN: No itching, burning, rashes, or lesions   All other review of systems is negative unless indicated above.    MEDICATIONS:  MEDICATIONS  (STANDING):  albuterol/ipratropium for Nebulization 3 milliLiter(s) Nebulizer every 6 hours  buDESOnide    Inhalation Suspension 0.5 milliGRAM(s) Inhalation two times a day  chlorhexidine 4% Liquid 1 Application(s) Topical <User Schedule>  enoxaparin Injectable 40 milliGRAM(s) SubCutaneous daily  hydrALAZINE 25 milliGRAM(s) Oral every 8 hours  levETIRAcetam 500 milliGRAM(s) Oral two times a day  piperacillin/tazobactam IVPB.. 3.375 Gram(s) IV Intermittent every 8 hours  polyethylene glycol 3350 17 Gram(s) Oral daily      PHYSICAL EXAM:  T(C): 36.9 (12-21-21 @ 05:31), Max: 36.9 (12-21-21 @ 05:31)  HR: 86 (12-21-21 @ 05:31) (79 - 86)  BP: 151/66 (12-21-21 @ 05:31) (118/64 - 151/66)  RR: 18 (12-21-21 @ 05:31) (17 - 18)  SpO2: 98% (12-21-21 @ 05:31) (91% - 98%)  Wt(kg): --  I&O's Summary    20 Dec 2021 07:01  -  21 Dec 2021 07:00  --------------------------------------------------------  IN: 980 mL / OUT: 1200 mL / NET: -220 mL          Appearance: Normal	  HEENT:  PERRLA   Lymphatic: No lymphadenopathy   Cardiovascular: Normal S1 S2, no JVD  Respiratory: normal effort , clear  Gastrointestinal:  Soft, Non-tender  Skin: No rashes,  warm to touch  Psychiatry:  Mood & affect appropriate  Musculuskeletal: No edema      All labs, Imaging and EKGs personally reviewed       12-20-21 @ 07:01  -  12-21-21 @ 07:00  --------------------------------------------------------  IN: 980 mL / OUT: 1200 mL / NET: -220 mL                            10.2   5.85  )-----------( 155      ( 21 Dec 2021 08:27 )             31.0               12-21    140  |  99  |  8   ----------------------------<  103<H>  3.7   |  32<H>  |  0.55    Ca    8.9      21 Dec 2021 08:27  Phos  3.9     12-20  Mg     2.0     12-20    TPro  6.0  /  Alb  3.4  /  TBili  0.7  /  DBili  x   /  AST  28  /  ALT  30  /  AlkPhos  72  12-20                     ABG - ( 21 Dec 2021 06:28 )  pH, Arterial: 7.42  pH, Blood: x     /  pCO2: 59    /  pO2: 108   / HCO3: 38    / Base Excess: 11.4  /  SaO2: 99.3          < from: CT Chest No Cont (12.20.21 @ 19:04) >    ******PRELIMINARY REPORT******      ******PRELIMINARY REPORT******       ACC: [id number] EXAM:  CT CHEST                          PROCEDURE DATE:  12/20/2021    ******PRELIMINARY REPORT******      ******PRELIMINARY REPORT******           INTERPRETATION:  1. No pneumonia.  2. Small bilateral effusions with associated lower lobe compressive   atelectasis.  3. Please f/u official read in the AM.        ******PRELIMINARY REPORT******      ******PRELIMINARY REPORT******       MIKE WHEAT MD; Resident Radiologist    < end of copied text >                 Name of Patient : TONIA MAJANO  MRN: [id number]  Date of visit: 12-21-21 @ 09:50      Subjective: Patient seen and examined. No new events except as noted.   Patient states that she has been using inceptive spirometer.   Saturating 98% on 2L NC.     REVIEW OF SYSTEMS:    CONSTITUTIONAL: No weakness, fevers or chills  EYES/ENT: No visual changes;  No vertigo or throat pain   NECK: No pain or stiffness  RESPIRATORY: No cough, wheezing, hemoptysis; No shortness of breath  CARDIOVASCULAR: No chest pain or palpitations  GASTROINTESTINAL: No abdominal or epigastric pain. No nausea, vomiting, or hematemesis; No diarrhea or constipation. No melena or hematochezia.  GENITOURINARY: No dysuria, frequency or hematuria  NEUROLOGICAL: No numbness or weakness  SKIN: No itching, burning, rashes, or lesions   All other review of systems is negative unless indicated above.    MEDICATIONS:  MEDICATIONS  (STANDING):  albuterol/ipratropium for Nebulization 3 milliLiter(s) Nebulizer every 6 hours  buDESOnide    Inhalation Suspension 0.5 milliGRAM(s) Inhalation two times a day  chlorhexidine 4% Liquid 1 Application(s) Topical <User Schedule>  enoxaparin Injectable 40 milliGRAM(s) SubCutaneous daily  hydrALAZINE 25 milliGRAM(s) Oral every 8 hours  levETIRAcetam 500 milliGRAM(s) Oral two times a day  piperacillin/tazobactam IVPB.. 3.375 Gram(s) IV Intermittent every 8 hours  polyethylene glycol 3350 17 Gram(s) Oral daily      PHYSICAL EXAM:  T(C): 36.9 (12-21-21 @ 05:31), Max: 36.9 (12-21-21 @ 05:31)  HR: 86 (12-21-21 @ 05:31) (79 - 86)  BP: 151/66 (12-21-21 @ 05:31) (118/64 - 151/66)  RR: 18 (12-21-21 @ 05:31) (17 - 18)  SpO2: 98% (12-21-21 @ 05:31) (91% - 98%)  Wt(kg): --  I&O's Summary    20 Dec 2021 07:01  -  21 Dec 2021 07:00  --------------------------------------------------------  IN: 980 mL / OUT: 1200 mL / NET: -220 mL          Appearance: Normal	  HEENT:  PERRLA   Lymphatic: No lymphadenopathy   Cardiovascular: Normal S1 S2, no JVD  Respiratory: normal effort , clear  Gastrointestinal:  Soft, Non-tender  Skin: No rashes,  warm to touch  Psychiatry:  Mood & affect appropriate  Musculoskeletal: No edema      All labs, Imaging and EKGs personally reviewed       12-20-21 @ 07:01  -  12-21-21 @ 07:00  --------------------------------------------------------  IN: 980 mL / OUT: 1200 mL / NET: -220 mL                            10.2   5.85  )-----------( 155      ( 21 Dec 2021 08:27 )             31.0               12-21    140  |  99  |  8   ----------------------------<  103<H>  3.7   |  32<H>  |  0.55    Ca    8.9      21 Dec 2021 08:27  Phos  3.9     12-20  Mg     2.0     12-20    TPro  6.0  /  Alb  3.4  /  TBili  0.7  /  DBili  x   /  AST  28  /  ALT  30  /  AlkPhos  72  12-20                     ABG - ( 21 Dec 2021 06:28 )  pH, Arterial: 7.42  pH, Blood: x     /  pCO2: 59    /  pO2: 108   / HCO3: 38    / Base Excess: 11.4  /  SaO2: 99.3          < from: CT Chest No Cont (12.20.21 @ 19:04) >    ******PRELIMINARY REPORT******      ******PRELIMINARY REPORT******       ACC: [id number] EXAM:  CT CHEST                          PROCEDURE DATE:  12/20/2021    ******PRELIMINARY REPORT******      ******PRELIMINARY REPORT******           INTERPRETATION:  1. No pneumonia.  2. Small bilateral effusions with associated lower lobe compressive   atelectasis.  3. Please f/u official read in the AM.        ******PRELIMINARY REPORT******      ******PRELIMINARY REPORT******       MIKE WHEAT MD; Resident Radiologist    < end of copied text >        Culture - Blood (12.15.21 @ 18:57)   Specimen Source: .Blood Blood-Peripheral   Culture Results: No Growth Final     Specimen Source: Clean Catch Clean Catch (Midstream)   Culture Results: >100,000 CFU/ml Escherichia coli   Organism Identification: Escherichia coli   Organism: Escherichia coli     Culture - Blood (12.15.21 @ 08:55)   Specimen Source: .Blood Blood-Venous   Culture Results: No Growth Final

## 2021-12-21 NOTE — PROGRESS NOTE ADULT - SUBJECTIVE AND OBJECTIVE BOX
DATE OF SERVICE: 12-21-21 @ 13:53    Subjective: Patient seen and examined. No new events except as noted.     SUBJECTIVE/ROS:    Pt seen and examined early this am  nad     MEDICATIONS:  MEDICATIONS  (STANDING):  albuterol/ipratropium for Nebulization 3 milliLiter(s) Nebulizer every 6 hours  buDESOnide    Inhalation Suspension 0.5 milliGRAM(s) Inhalation two times a day  chlorhexidine 4% Liquid 1 Application(s) Topical <User Schedule>  enoxaparin Injectable 40 milliGRAM(s) SubCutaneous daily  hydrALAZINE 25 milliGRAM(s) Oral every 8 hours  levETIRAcetam 500 milliGRAM(s) Oral two times a day  piperacillin/tazobactam IVPB.. 3.375 Gram(s) IV Intermittent every 8 hours  polyethylene glycol 3350 17 Gram(s) Oral daily      PHYSICAL EXAM:  T(C): 36.9 (12-21-21 @ 05:31), Max: 36.9 (12-21-21 @ 05:31)  HR: 86 (12-21-21 @ 05:31) (80 - 86)  BP: 151/66 (12-21-21 @ 05:31) (138/62 - 151/66)  RR: 18 (12-21-21 @ 05:31) (18 - 18)  SpO2: 98% (12-21-21 @ 05:31) (96% - 98%)  Wt(kg): --  I&O's Summary    20 Dec 2021 07:01  -  21 Dec 2021 07:00  --------------------------------------------------------  IN: 980 mL / OUT: 1200 mL / NET: -220 mL    21 Dec 2021 07:01  -  21 Dec 2021 13:53  --------------------------------------------------------  IN: 0 mL / OUT: 400 mL / NET: -400 mL            JVP: Normal  Neck: supple  Lung: clear   CV: S1 S2 , Murmur:  Abd: soft  Ext: No edema  neuro: Awake / alert  Psych: flat affect  Skin: normal``    LABS/DATA:    CARDIAC MARKERS:                                10.2   5.85  )-----------( 155      ( 21 Dec 2021 08:27 )             31.0     12-21    140  |  99  |  8   ----------------------------<  103<H>  3.7   |  32<H>  |  0.55    Ca    8.9      21 Dec 2021 08:27  Phos  3.9     12-20  Mg     2.0     12-20    TPro  6.0  /  Alb  3.4  /  TBili  0.7  /  DBili  x   /  AST  28  /  ALT  30  /  AlkPhos  72  12-20    proBNP:   Lipid Profile:   HgA1c:   TSH:     TELE:  EKG:

## 2021-12-21 NOTE — PROGRESS NOTE ADULT - PROBLEM SELECTOR PLAN 1
acute on chronic hypercapnia - 2nd to underlying COPD based on bedside spirometry with underlying ISA - pt has hx of ISA.  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 Eventual MR head?  -On abx for UTI  -Wean O2 as tolerated, keep sats >90%. Continuous pulse ox.   -Observed off AVAPS overnight, ABG this AM 7.42/59/108/38  -Will need NIV at home. Start bipap 15/5/30% qHS. ABG in AM.   -Will reach out to [facility]

## 2021-12-21 NOTE — PROGRESS NOTE ADULT - ASSESSMENT
Patient is an 87 y/o F with PMH of acromegaly, HTN, breast CA s/p L mastectomy. Presents to ED with c/o fatigue and bradycardia. Found to have +UA, started on abx. 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 MICU consulted for hypercapnia with decreased mentation. Repeat ABG with ongoing hypercapnia but stable pH. 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, on Keppra for seizure ppx ever since  - GCS score 11 12/15 ->15 12/16; CTH 12/14 had chronic b/l basal ganglia details  - likely metabolic encephalopathy 2/2 sepsis 2/2 UTI, also likely has some underlying chronic hypercapnia with possible acuity  - c/w Keppra 500 mg q12  - neuro checks  - neurology consulted, appreciate recs  - will plan for eventual MRI brain no con, rEEG  - holding home memantine due to side effect of bradycardia  - will send folate, b12, TSH, a1c, MMA, iron panel; F/U results   -- Patient will need eventual MR Brain     #Bradycardia   - pt was hasmukh to upper 30's during RRT on 12/15 evening, later came up 45-50. EKG showed sinus hasmukh, no AV block.  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   --Has resolved      #Acute Respiratory failure with hypercapnia   --? Acute on chronic hypercapnia or Secondary to underlying infectious cause   --Pt has history of ISA, has not used CPAP in >10 years per pulmonary   --Pulmonary is on board; appreciate their care; F/U recommendations   --ABG during RRT 12/15 7.28, pCO2 78, pO2 126, HCO3 37; VBG later showed pH 7.17, pCO2 85, pO2 38, HCO3 31  -- Juan has underlying chronic hypercapnia  --S/P MICU now on floor   --Pulmonary is on board; appreciate their care; F/U recommendations   --Per pulmonary, maintain continuous pulse ox, keep O2 > 90%, wean O2 as able to, continue with ABx for UTI, bronchodilators, check bedside spirometry, Observe off of AVAPs tonight 12/20 unless change in respiratory status  --Check ABG in AM  -- CT Chest non-contrast as ordered by pulmonology; Preliminary results as above; F/U Final results        #Sepsis due to UTI  --Many bacteria on UA, + LE, moderate nitrite  --Monitor CBC and temperature curve   --Likely contributing to current lethargy  --Blood culture X2 with no growth  --Urine culture is + for E. coli, continue with current Abx   --Continue with Zosyn   - zosyn (12/15 - )    #Thrombocytopenia   --platelets 166 -> 125  - maintain active T&S  --Monitor CBC    #HTN  --Holding home  metoprolol in the setting of bradycardia   --Cardiology on board; F/U recommendations  --On Hydralazine 25, will continue   --Continue with close monitoring of BP and adjust medications as needed      #History of acromegaly  --Patient's PCP Dr. Julian and Dr. Paulino  --Continue Keppra 500 BID  --Seizure prophylaxis       - passed dysphagia eval 10/16 -> ordered for diet  - monitor glucose        #PPx  DVT: lovenox   Patient is an 85 y/o F with PMH of acromegaly, HTN, breast CA s/p L mastectomy. Presents to ED with c/o fatigue and bradycardia. Found to have +UA, started on abx. 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 MICU consulted for hypercapnia with decreased mentation. Repeat ABG with ongoing hypercapnia but stable pH. 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, on Keppra for seizure ppx ever since  - GCS score 11 12/15 ->15 12/16; CTH 12/14 had chronic b/l basal ganglia details  - likely metabolic encephalopathy 2/2 sepsis 2/2 UTI, also likely has some underlying chronic hypercapnia with possible acuity  - c/w Keppra 500 mg q12  - neuro checks  - neurology consulted, appreciate recs  - will plan for eventual MRI brain no con, rEEG  - holding home memantine due to side effect of bradycardia  - will send folate, b12, TSH, a1c, MMA, iron panel; F/U results   -- Patient will need eventual MR Brain     #Bradycardia   - pt was hasmukh to upper 30's during RRT on 12/15 evening, later came up 45-50. EKG showed sinus hasmukh, no AV block.  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   --Has resolved      #Acute Respiratory failure with hypercapnia   --? Acute on chronic hypercapnia or Secondary to underlying infectious cause   --Pt has history of ISA, has not used CPAP in >10 years per pulmonary   --Pulmonary is on board; appreciate their care; F/U recommendations   --ABG during RRT 12/15 7.28, pCO2 78, pO2 126, HCO3 37; VBG later showed pH 7.17, pCO2 85, pO2 38, HCO3 31  -- Juan has underlying chronic hypercapnia  --S/P MICU now on floor   --Pulmonary is on board; appreciate their care; F/U recommendations   --Per pulmonary, maintain continuous pulse ox, keep O2 > 90%, wean O2 as able to, continue with ABx for UTI, bronchodilators, check bedside spirometry, Observe off of AVAPs tonight 12/20 unless change in respiratory status  --Check ABG in AM  -- CT Chest non-contrast as ordered by pulmonology; Preliminary results as above; F/U Final results        #Sepsis due to UTI  --Many bacteria on UA, + LE, moderate nitrite  --Monitor CBC and temperature curve   --Likely contributing to current lethargy  --Blood culture X2 with no growth  --Urine culture is + for E. coli, continue with current Abx   --Continue with Zosyn  (12/15 - )    #Thrombocytopenia   --platelets 166 -> 125  - maintain active T&S  --Monitor CBC    #HTN  --Holding home  metoprolol in the setting of bradycardia   --Cardiology on board; F/U recommendations  --On Hydralazine 25, will continue   --Continue with close monitoring of BP and adjust medications as needed      #History of acromegaly  --Patient's PCP Dr. Julian and Dr. Paulino  --Continue Keppra 500 BID  --Seizure prophylaxis       - passed dysphagia eval 10/16 -> ordered for diet  - monitor glucose        #PPx  DVT: lovenox    Discharge planning as per Pulmonology

## 2021-12-21 NOTE — PROGRESS NOTE ADULT - SUBJECTIVE AND OBJECTIVE BOX
Follow-up Pulm Progress Note    Observed off AVAPS overnight    Breathing comfortably on 2LNC  Denies SOB/CP     Medications:  MEDICATIONS  (STANDING):  albuterol/ipratropium for Nebulization 3 milliLiter(s) Nebulizer every 6 hours  buDESOnide    Inhalation Suspension 0.5 milliGRAM(s) Inhalation two times a day  chlorhexidine 4% Liquid 1 Application(s) Topical <User Schedule>  enoxaparin Injectable 40 milliGRAM(s) SubCutaneous daily  hydrALAZINE 25 milliGRAM(s) Oral every 8 hours  levETIRAcetam 500 milliGRAM(s) Oral two times a day  piperacillin/tazobactam IVPB.. 3.375 Gram(s) IV Intermittent every 8 hours  polyethylene glycol 3350 17 Gram(s) Oral daily        Vital Signs Last 24 Hrs  T(C): 36.8 (21 Dec 2021 14:16), Max: 36.9 (21 Dec 2021 05:31)  T(F): 98.3 (21 Dec 2021 14:16), Max: 98.4 (21 Dec 2021 05:31)  HR: 87 (21 Dec 2021 14:16) (80 - 87)  BP: 122/68 (21 Dec 2021 14:16) (122/68 - 151/66)  BP(mean): --  RR: 18 (21 Dec 2021 14:16) (18 - 18)  SpO2: 95% (21 Dec 2021 14:16) (95% - 98%)    ABG - ( 21 Dec 2021 06:28 )  pH, Arterial: 7.42  pH, Blood: x     /  pCO2: 59    /  pO2: 108   / HCO3: 38    / Base Excess: 11.4  /  SaO2: 99.3              VBG pH 7.41 12-20 @ 02:31    VBG pCO2 61 12-20 @ 02:31    VBG O2 sat 95.3 12-20 @ 02:31    VBG lactate 0.8 12-20 @ 02:31      12-20 @ 07:01  -  12-21 @ 07:00  --------------------------------------------------------  IN: 980 mL / OUT: 1200 mL / NET: -220 mL          LABS:                        10.2   5.85  )-----------( 155      ( 21 Dec 2021 08:27 )             31.0     12-21    140  |  99  |  8   ----------------------------<  103<H>  3.7   |  32<H>  |  0.55    Ca    8.9      21 Dec 2021 08:27  Phos  3.9     12-20  Mg     2.0     12-20    TPro  6.0  /  Alb  3.4  /  TBili  0.7  /  DBili  x   /  AST  28  /  ALT  30  /  AlkPhos  72  12-20                      CULTURES:     Culture - Blood (collected 12-15-21 @ 18:57)  Source: .Blood Blood-Peripheral  Final Report (12-20-21 @ 19:01):    No Growth Final    Culture - Blood (collected 12-15-21 @ 08:55)  Source: .Blood Blood-Venous  Final Report (12-20-21 @ 09:00):    No Growth Final        Culture - Urine (collected 12-15-21 @ 09:17)  Source: Clean Catch Clean Catch (Midstream)  Final Report (12-18-21 @ 10:16):    >100,000 CFU/ml Escherichia coli  Organism: Escherichia coli (12-18-21 @ 10:16)  Organism: Escherichia coli (12-18-21 @ 10:16)      -  Amikacin: S <=16      -  Amoxicillin/Clavulanic Acid: S <=8/4      -  Ampicillin: R >16 These ampicillin results predict results for amoxicillin      -  Ampicillin/Sulbactam: S 8/4 Enterobacter, Klebsiella aerogenes, Citrobacter, and Serratia may develop resistance during prolonged therapy (3-4 days)      -  Aztreonam: S <=4      -  Cefazolin: S <=2 ([id number]_[id number]) For uncomplicated UTI with K. pneumoniae, E. coli, or P. mirablis: MARY <=16 is sensitive and MARY >=32 is resistant. This also predicts results for oral agents cefaclor, cefdinir, cefpodoxime, cefprozil, cefuroxime axetil, cephalexin and locarbef for uncomplicated UTI. Note that some isolates may be susceptible to these agents while testing resistant to cefazolin.      -  Cefepime: S <=2      -  Cefoxitin: S <=8      -  Ceftriaxone: S <=1 Enterobacter, Klebsiella aerogenes, Citrobacter, and Serratia may develop resistance during prolonged therapy      -  Ciprofloxacin: R >2      -  Ertapenem: S <=0.5      -  Gentamicin: S <=2      -  Imipenem: S <=1      -  Levofloxacin: R >4      -  Meropenem: S <=1      -  Nitrofurantoin: S <=32 Should not be used to treat pyelonephritis      -  Piperacillin/Tazobactam: S <=8      -  Tigecycline: S <=2      -  Tobramycin: S <=2      -  Trimethoprim/Sulfamethoxazole: S <=0.5/9.5      Method Type: MARY              Physical Examination:  PULM: Clear to auscultation bilaterally, no significant sputum production  CVS: S1, S2 heard      RADIOLOGY REVIEWED  CXR 12/16: small b/l pl effusions

## 2021-12-22 LAB
ANION GAP SERPL CALC-SCNC: 10 MMOL/L — SIGNIFICANT CHANGE UP (ref 5–17)
BASE EXCESS BLDA CALC-SCNC: 13 MMOL/L — HIGH (ref -2–3)
BUN SERPL-MCNC: 10 MG/DL — SIGNIFICANT CHANGE UP (ref 7–23)
CALCIUM SERPL-MCNC: 9.6 MG/DL — SIGNIFICANT CHANGE UP (ref 8.4–10.5)
CHLORIDE SERPL-SCNC: 96 MMOL/L — SIGNIFICANT CHANGE UP (ref 96–108)
CO2 BLDA-SCNC: 40 MMOL/L — HIGH (ref 19–24)
CO2 SERPL-SCNC: 32 MMOL/L — HIGH (ref 22–31)
CREAT SERPL-MCNC: 0.5 MG/DL — SIGNIFICANT CHANGE UP (ref 0.5–1.3)
GLUCOSE SERPL-MCNC: 90 MG/DL — SIGNIFICANT CHANGE UP (ref 70–99)
HCO3 BLDA-SCNC: 39 MMOL/L — HIGH (ref 21–28)
HCT VFR BLD CALC: 29.5 % — LOW (ref 34.5–45)
HGB BLD-MCNC: 9.7 G/DL — LOW (ref 11.5–15.5)
MCHC RBC-ENTMCNC: 24 PG — LOW (ref 27–34)
MCHC RBC-ENTMCNC: 32.9 GM/DL — SIGNIFICANT CHANGE UP (ref 32–36)
MCV RBC AUTO: 73 FL — LOW (ref 80–100)
NRBC # BLD: 0 /100 WBCS — SIGNIFICANT CHANGE UP (ref 0–0)
PCO2 BLDA: 52 MMHG — HIGH (ref 32–45)
PH BLDA: 7.48 — HIGH (ref 7.35–7.45)
PLATELET # BLD AUTO: 141 K/UL — LOW (ref 150–400)
PO2 BLDA: 155 MMHG — HIGH (ref 83–108)
POTASSIUM SERPL-MCNC: 3.6 MMOL/L — SIGNIFICANT CHANGE UP (ref 3.5–5.3)
POTASSIUM SERPL-SCNC: 3.6 MMOL/L — SIGNIFICANT CHANGE UP (ref 3.5–5.3)
RBC # BLD: 4.04 M/UL — SIGNIFICANT CHANGE UP (ref 3.8–5.2)
RBC # FLD: 18.3 % — HIGH (ref 10.3–14.5)
SAO2 % BLDA: 99.9 % — HIGH (ref 94–98)
SODIUM SERPL-SCNC: 138 MMOL/L — SIGNIFICANT CHANGE UP (ref 135–145)
WBC # BLD: 4.86 K/UL — SIGNIFICANT CHANGE UP (ref 3.8–10.5)
WBC # FLD AUTO: 4.86 K/UL — SIGNIFICANT CHANGE UP (ref 3.8–10.5)

## 2021-12-22 RX ADMIN — Medication 25 MILLIGRAM(S): at 05:52

## 2021-12-22 RX ADMIN — POLYETHYLENE GLYCOL 3350 17 GRAM(S): 17 POWDER, FOR SOLUTION ORAL at 11:21

## 2021-12-22 RX ADMIN — Medication 3 MILLILITER(S): at 05:52

## 2021-12-22 RX ADMIN — CHLORHEXIDINE GLUCONATE 1 APPLICATION(S): 213 SOLUTION TOPICAL at 11:20

## 2021-12-22 RX ADMIN — LEVETIRACETAM 500 MILLIGRAM(S): 250 TABLET, FILM COATED ORAL at 05:51

## 2021-12-22 RX ADMIN — Medication 25 MILLIGRAM(S): at 21:32

## 2021-12-22 RX ADMIN — PIPERACILLIN AND TAZOBACTAM 25 GRAM(S): 4; .5 INJECTION, POWDER, LYOPHILIZED, FOR SOLUTION INTRAVENOUS at 05:52

## 2021-12-22 RX ADMIN — LEVETIRACETAM 500 MILLIGRAM(S): 250 TABLET, FILM COATED ORAL at 17:09

## 2021-12-22 RX ADMIN — Medication 3 MILLILITER(S): at 17:10

## 2021-12-22 RX ADMIN — Medication 25 MILLIGRAM(S): at 13:38

## 2021-12-22 RX ADMIN — TIOTROPIUM BROMIDE AND OLODATEROL 2 PUFF(S): 3.124; 2.736 SPRAY, METERED RESPIRATORY (INHALATION) at 11:20

## 2021-12-22 RX ADMIN — ENOXAPARIN SODIUM 40 MILLIGRAM(S): 100 INJECTION SUBCUTANEOUS at 11:21

## 2021-12-22 RX ADMIN — Medication 3 MILLILITER(S): at 11:21

## 2021-12-22 NOTE — PROGRESS NOTE ADULT - SUBJECTIVE AND OBJECTIVE BOX
Follow-up Pulm Progress Note    Bipap worn overnight   No new respiratory events overnight.  Denies SOB/CP.   Sats 97% 2LNC   Reportedly desaturated to 70s on RA this AM     Medications:  MEDICATIONS  (STANDING):  albuterol/ipratropium for Nebulization 3 milliLiter(s) Nebulizer every 6 hours  chlorhexidine 4% Liquid 1 Application(s) Topical <User Schedule>  enoxaparin Injectable 40 milliGRAM(s) SubCutaneous daily  hydrALAZINE 25 milliGRAM(s) Oral every 8 hours  levETIRAcetam 500 milliGRAM(s) Oral two times a day  polyethylene glycol 3350 17 Gram(s) Oral daily  tiotropium 2.5 MICROgram(s)/olodaterol 2.5 MICROgram(s) (STIOLTO) Inhaler 2 Puff(s) Inhalation daily        Vital Signs Last 24 Hrs  T(C): 36.7 (22 Dec 2021 08:34), Max: 36.9 (21 Dec 2021 21:46)  T(F): 98.1 (22 Dec 2021 08:34), Max: 98.4 (21 Dec 2021 21:46)  HR: 80 (22 Dec 2021 09:14) (80 - 93)  BP: 130/69 (22 Dec 2021 08:34) (122/68 - 158/71)  BP(mean): --  RR: 18 (22 Dec 2021 08:34) (18 - 18)  SpO2: 95% (22 Dec 2021 09:14) (93% - 98%)    ABG - ( 22 Dec 2021 05:25 )  pH, Arterial: 7.48  pH, Blood: x     /  pCO2: 52    /  pO2: 155   / HCO3: 39    / Base Excess: 13.0  /  SaO2: 99.9                  12-21 @ 07:01  -  12-22 @ 07:00  --------------------------------------------------------  IN: 650 mL / OUT: 1500 mL / NET: -850 mL          LABS:                        9.7    4.86  )-----------( 141      ( 22 Dec 2021 06:51 )             29.5     12-22    138  |  96  |  10  ----------------------------<  90  3.6   |  32<H>  |  0.50    Ca    9.6      22 Dec 2021 06:51              CULTURES:     Culture - Blood (collected 12-15-21 @ 18:57)  Source: .Blood Blood-Peripheral  Final Report (12-20-21 @ 19:01):    No Growth Final    Culture - Blood (collected 12-15-21 @ 08:55)  Source: .Blood Blood-Venous  Final Report (12-20-21 @ 09:00):    No Growth Final        Culture - Urine (collected 12-15-21 @ 09:17)  Source: Clean Catch Clean Catch (Midstream)  Final Report (12-18-21 @ 10:16):    >100,000 CFU/ml Escherichia coli  Organism: Escherichia coli (12-18-21 @ 10:16)  Organism: Escherichia coli (12-18-21 @ 10:16)      -  Amikacin: S <=16      -  Amoxicillin/Clavulanic Acid: S <=8/4      -  Ampicillin: R >16 These ampicillin results predict results for amoxicillin      -  Ampicillin/Sulbactam: S 8/4 Enterobacter, Klebsiella aerogenes, Citrobacter, and Serratia may develop resistance during prolonged therapy (3-4 days)      -  Aztreonam: S <=4      -  Cefazolin: S <=2 (MIC_CL_COM_[email or web address]) For uncomplicated UTI with K. pneumoniae, E. coli, or P. mirablis: MARY <=16 is sensitive and MARY >=32 is resistant. This also predicts results for oral agents cefaclor, cefdinir, cefpodoxime, cefprozil, cefuroxime axetil, cephalexin and locarbef for uncomplicated UTI. Note that some isolates may be susceptible to these agents while testing resistant to cefazolin.      -  Cefepime: S <=2      -  Cefoxitin: S <=8      -  Ceftriaxone: S <=1 Enterobacter, Klebsiella aerogenes, Citrobacter, and Serratia may develop resistance during prolonged therapy      -  Ciprofloxacin: R >2      -  Ertapenem: S <=0.5      -  Gentamicin: S <=2      -  Imipenem: S <=1      -  Levofloxacin: R >4      -  Meropenem: S <=1      -  Nitrofurantoin: S <=32 Should not be used to treat pyelonephritis      -  Piperacillin/Tazobactam: S <=8      -  Tigecycline: S <=2      -  Tobramycin: S <=2      -  Trimethoprim/Sulfamethoxazole: S <=0.5/9.5      Method Type: MARY            Physical Examination:  PULM: Clear to auscultation bilaterally, no significant sputum production  CVS: S1, S2 heard      RADIOLOGY REVIEWED  CT chest: < from: CT Chest No Cont (12.20.21 @ 19:04) >  FINDINGS:    LUNGS, AIRWAYS, AND PLEURA: No endobronchial lesions. Small bilateral   pleural effusions with adjacent compressive atelectasis, right greater   than left. There is subpleural reticulation in the left upper   lobe/lingula compatible with prior breast radiotherapy.    MEDIASTINUM/LYMPH NODES: No lymphadenopathy. Esophagus is patulous.    VESSELS: The main pulmonary artery is larger than the adjacent ascending   aorta which measures 3.2 cm. Three-vessel coronary artery calcifications.    HEART: Left atrial enlargement. Trace pericardial effusion.    CHEST WALL AND LOWER NECK: Left mastectomy and breast implant. Diffusely   enlarged thyroid.    VISUALIZED UPPER ABDOMEN: Unremarkable    BONES: Degenerative changes. Age indeterminate compression deformity of   the T12 vertebral body.    IMPRESSION:    1.  Small pleural effusions    2.  Enlarged main pulmonary artery, may be seen in the setting of   pulmonary arterial hypertension.    < end of copied text >

## 2021-12-22 NOTE — PROGRESS NOTE ADULT - PROBLEM SELECTOR PLAN 1
acute on chronic hypercapnia - 2nd to underlying COPD based on bedside spirometry with underlying ISA - pt has hx of ISA.  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 Eventual MR head?  -On abx for UTI  -Wean O2 as tolerated, keep sats >90% (currently 2LNC). Continuous pulse ox.   -Bipap 15/5/30% worn overnight, AM ABG noted. Settings changed to 12/5/30%   -Will need NIV at home. Overnight oximetry study ordered (minimum 3 hrs on 2LNC).  -Call made to [facility]

## 2021-12-22 NOTE — PROGRESS NOTE ADULT - SUBJECTIVE AND OBJECTIVE BOX
DATE OF SERVICE: 12-22-21 @ 13:37    Subjective: Patient seen and examined. No new events except as noted.     SUBJECTIVE/ROS:    No chest pain, dyspnea, palpitation, or dizziness.     MEDICATIONS:  MEDICATIONS  (STANDING):  albuterol/ipratropium for Nebulization 3 milliLiter(s) Nebulizer every 6 hours  chlorhexidine 4% Liquid 1 Application(s) Topical <User Schedule>  enoxaparin Injectable 40 milliGRAM(s) SubCutaneous daily  hydrALAZINE 25 milliGRAM(s) Oral every 8 hours  levETIRAcetam 500 milliGRAM(s) Oral two times a day  polyethylene glycol 3350 17 Gram(s) Oral daily  tiotropium 2.5 MICROgram(s)/olodaterol 2.5 MICROgram(s) (STIOLTO) Inhaler 2 Puff(s) Inhalation daily      PHYSICAL EXAM:  T(C): 36.2 (12-22-21 @ 13:06), Max: 36.9 (12-21-21 @ 21:46)  HR: 83 (12-22-21 @ 13:06) (80 - 93)  BP: 143/72 (12-22-21 @ 13:06) (122/68 - 158/71)  RR: 18 (12-22-21 @ 13:06) (18 - 18)  SpO2: 95% (12-22-21 @ 13:06) (93% - 98%)  Wt(kg): --  I&O's Summary    21 Dec 2021 07:01  -  22 Dec 2021 07:00  --------------------------------------------------------  IN: 650 mL / OUT: 1500 mL / NET: -850 mL    22 Dec 2021 07:01  -  22 Dec 2021 13:37  --------------------------------------------------------  IN: 0 mL / OUT: 700 mL / NET: -700 mL            JVP: Normal  Neck: supple  Lung: clear   CV: S1 S2 , Murmur:  Abd: soft  Ext: No edema  neuro: Awake / alert  Psych: flat affect  Skin: normal``    LABS/DATA:    CARDIAC MARKERS:                                9.7    4.86  )-----------( 141      ( 22 Dec 2021 06:51 )             29.5     12-22    138  |  96  |  10  ----------------------------<  90  3.6   |  32<H>  |  0.50    Ca    9.6      22 Dec 2021 06:51      proBNP:   Lipid Profile:   HgA1c:   TSH:     TELE:  EKG:

## 2021-12-22 NOTE — PROGRESS NOTE ADULT - ASSESSMENT
Patient is an 87 y/o F with PMH of acromegaly, HTN, breast CA s/p L mastectomy. Presents to ED with c/o fatigue and bradycardia. Found to have +UA, started on abx. 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 MICU consulted for hypercapnia with decreased mentation. Repeat ABG with ongoing hypercapnia but stable pH. 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, on Keppra for seizure ppx ever since  - GCS score 11 12/15 ->15 12/16; CTH 12/14 had chronic b/l basal ganglia details  - likely metabolic encephalopathy 2/2 sepsis 2/2 UTI, also likely has some underlying chronic hypercapnia with possible acuity  - c/w Keppra 500 mg q12  - neuro checks  - neurology consulted, appreciate recs  - will plan for eventual MRI brain no con, rEEG  - holding home memantine due to side effect of bradycardia  - will send folate, b12, TSH, a1c, MMA, iron panel; F/U results   -- Patient will need eventual MR Brain     #Bradycardia   - pt was hasmukh to upper 30's during RRT on 12/15 evening, later came up 45-50. EKG showed sinus hasmukh, no AV block.  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   --Has resolved      #Acute Respiratory failure with hypercapnia   --? Acute on chronic hypercapnia or Secondary to underlying infectious cause   --Pt has history of ISA, has not used CPAP in >10 years per pulmonary   --Pulmonary is on board; appreciate their care; F/U recommendations   --ABG during RRT 12/15 7.28, pCO2 78, pO2 126, HCO3 37; VBG later showed pH 7.17, pCO2 85, pO2 38, HCO3 31  -- Juan has underlying chronic hypercapnia  --S/P MICU now on floor   --Pulmonary is on board; appreciate their care; F/U recommendations   --Per pulmonary, maintain continuous pulse ox, keep O2 > 90%, wean O2 as able to, continue with ABx for UTI, bronchodilators, check bedside spirometry, Observe off of AVAPs overnight 12/20  --Per pulm, patient will need NIV at home; Bipap started, settings as per pulm  -- Overnight Oximetry study 12/22  -- CT Chest non-contrast as ordered by pulmonology; Results as above; F/U recommendations       #COPD  --F/U Pulmonary recommendations   --Continue with Bipap  --Stiolto 2 puffs qd  -Duoneb q6h  -Eventual outpatient PFTs     #Sepsis due to UTI  --Many bacteria on UA, + LE, moderate nitrite  --Monitor CBC and temperature curve   --Likely contributing to current lethargy  --Blood culture X2 with no growth  --Urine culture is + for E. coli, continue with current Abx   --Continue with Zosyn  (12/15 - )    #Thrombocytopenia   --platelets 166 -> 125  - maintain active T&S  --Monitor CBC    #HTN  --Holding home  metoprolol in the setting of bradycardia   --Cardiology on board; F/U recommendations  --On Hydralazine 25, will continue   --Continue with close monitoring of BP and adjust medications as needed      #History of acromegaly  --Patient's PCP Dr. Julian and Dr. Paulino  --Continue Keppra 500 BID  --Seizure prophylaxis       - passed dysphagia eval 10/16 -> ordered for diet  - monitor glucose        #PPx  DVT: lovenox    Discharge planning as per Pulmonology; Planning for Rehab

## 2021-12-22 NOTE — PROGRESS NOTE ADULT - SUBJECTIVE AND OBJECTIVE BOX
Name of Patient : TONAI MAJANO  MRN: [id number]  Date of visit: 12-22-21 @ 17:10      Subjective: Patient seen and examined. No new events except as noted.   Patient seen this morning.   Patient states that she is using the incentive spirometer.     REVIEW OF SYSTEMS:    CONSTITUTIONAL: No weakness, fevers or chills  EYES/ENT: No visual changes;  No vertigo or throat pain   NECK: No pain or stiffness  RESPIRATORY: No cough, wheezing, hemoptysis; No shortness of breath  CARDIOVASCULAR: No chest pain or palpitations  GASTROINTESTINAL: No abdominal or epigastric pain. No nausea, vomiting, or hematemesis; No diarrhea or constipation. No melena or hematochezia.  GENITOURINARY: No dysuria, frequency or hematuria  NEUROLOGICAL: No numbness or weakness  SKIN: No itching, burning, rashes, or lesions   All other review of systems is negative unless indicated above.    MEDICATIONS:  MEDICATIONS  (STANDING):  albuterol/ipratropium for Nebulization 3 milliLiter(s) Nebulizer every 6 hours  chlorhexidine 4% Liquid 1 Application(s) Topical <User Schedule>  enoxaparin Injectable 40 milliGRAM(s) SubCutaneous daily  hydrALAZINE 25 milliGRAM(s) Oral every 8 hours  levETIRAcetam 500 milliGRAM(s) Oral two times a day  polyethylene glycol 3350 17 Gram(s) Oral daily  tiotropium 2.5 MICROgram(s)/olodaterol 2.5 MICROgram(s) (STIOLTO) Inhaler 2 Puff(s) Inhalation daily      PHYSICAL EXAM:  T(C): 36.2 (12-22-21 @ 13:06), Max: 36.9 (12-21-21 @ 21:46)  HR: 83 (12-22-21 @ 15:47) (80 - 93)  BP: 143/72 (12-22-21 @ 13:06) (130/69 - 158/71)  RR: 18 (12-22-21 @ 13:06) (18 - 18)  SpO2: 96% (12-22-21 @ 15:47) (93% - 98%)  Wt(kg): --  I&O's Summary    21 Dec 2021 07:01  -  22 Dec 2021 07:00  --------------------------------------------------------  IN: 650 mL / OUT: 1500 mL / NET: -850 mL    22 Dec 2021 07:01  -  22 Dec 2021 17:10  --------------------------------------------------------  IN: 0 mL / OUT: 700 mL / NET: -700 mL          Appearance: Normal	  HEENT:  PERRLA   Lymphatic: No lymphadenopathy   Cardiovascular: Normal S1 S2, no JVD  Respiratory: normal effort , clear  Gastrointestinal:  Soft, Non-tender  Skin: No rashes,  warm to touch  Psychiatry:  Mood & affect appropriate  Musculuskeletal: No edema      All labs, Imaging and EKGs personally reviewed         12-21-21 @ 07:01  -  12-22-21 @ 07:00  --------------------------------------------------------  IN: 650 mL / OUT: 1500 mL / NET: -850 mL    12-22-21 @ 07:01  -  12-22-21 @ 17:10  --------------------------------------------------------  IN: 0 mL / OUT: 700 mL / NET: -700 mL                            9.7    4.86  )-----------( 141      ( 22 Dec 2021 06:51 )             29.5               12-22    138  |  96  |  10  ----------------------------<  90  3.6   |  32<H>  |  0.50    Ca    9.6      22 Dec 2021 06:51                       ABG - ( 22 Dec 2021 05:25 )  pH, Arterial: 7.48  pH, Blood: x     /  pCO2: 52    /  pO2: 155   / HCO3: 39    / Base Excess: 13.0  /  SaO2: 99.9                        < from: CT Chest No Cont (12.20.21 @ 19:04) >    ACC: [id number] EXAM:  CT CHEST                          PROCEDURE DATE:  12/20/2021          INTERPRETATION:  CLINICAL INFORMATION: Shortness of breath.    COMPARISON: Chest radiograph 12/16/2021.    CONTRAST/COMPLICATIONS:  IV Contrast: NONE  Oral Contrast: NONE  Complications: None reported at time of study completion    PROCEDURE:  CT of the Chest was performed.  Sagittal and coronal reformats were performed.    FINDINGS:    LUNGS, AIRWAYS, AND PLEURA: No endobronchial lesions. Small bilateral   pleural effusions with adjacent compressive atelectasis, right greater   than left. There is subpleural reticulation in the left upper   lobe/lingula compatible with prior breast radiotherapy.    MEDIASTINUM/LYMPH NODES: No lymphadenopathy. Esophagus is patulous.    VESSELS: The main pulmonary artery is larger than the adjacent ascending   aorta which measures 3.2 cm. Three-vessel coronary artery calcifications.    HEART: Left atrial enlargement. Trace pericardial effusion.    CHEST WALL AND LOWER NECK: Left mastectomy and breast implant. Diffusely   enlarged thyroid.    VISUALIZED UPPER ABDOMEN: Unremarkable    BONES: Degenerative changes. Age indeterminate compression deformity of   the T12 vertebral body.    IMPRESSION:    1.  Small pleural effusions    2.  Enlarged main pulmonary artery, may be seen in the setting of   pulmonary arterial hypertension.    --- End of Report ---    < end of copied text >

## 2021-12-23 LAB
ANION GAP SERPL CALC-SCNC: 8 MMOL/L — SIGNIFICANT CHANGE UP (ref 5–17)
BUN SERPL-MCNC: 9 MG/DL — SIGNIFICANT CHANGE UP (ref 7–23)
CALCIUM SERPL-MCNC: 9.2 MG/DL — SIGNIFICANT CHANGE UP (ref 8.4–10.5)
CHLORIDE SERPL-SCNC: 97 MMOL/L — SIGNIFICANT CHANGE UP (ref 96–108)
CO2 SERPL-SCNC: 34 MMOL/L — HIGH (ref 22–31)
CREAT SERPL-MCNC: 0.45 MG/DL — LOW (ref 0.5–1.3)
GLUCOSE SERPL-MCNC: 91 MG/DL — SIGNIFICANT CHANGE UP (ref 70–99)
HCT VFR BLD CALC: 32.5 % — LOW (ref 34.5–45)
HGB BLD-MCNC: 10.2 G/DL — LOW (ref 11.5–15.5)
MAGNESIUM SERPL-MCNC: 2.2 MG/DL — SIGNIFICANT CHANGE UP (ref 1.6–2.6)
MCHC RBC-ENTMCNC: 23.7 PG — LOW (ref 27–34)
MCHC RBC-ENTMCNC: 31.4 GM/DL — LOW (ref 32–36)
MCV RBC AUTO: 75.4 FL — LOW (ref 80–100)
NRBC # BLD: 0 /100 WBCS — SIGNIFICANT CHANGE UP (ref 0–0)
PHOSPHATE SERPL-MCNC: 3.6 MG/DL — SIGNIFICANT CHANGE UP (ref 2.5–4.5)
PLATELET # BLD AUTO: 150 K/UL — SIGNIFICANT CHANGE UP (ref 150–400)
POTASSIUM SERPL-MCNC: 3.8 MMOL/L — SIGNIFICANT CHANGE UP (ref 3.5–5.3)
POTASSIUM SERPL-SCNC: 3.8 MMOL/L — SIGNIFICANT CHANGE UP (ref 3.5–5.3)
RBC # BLD: 4.31 M/UL — SIGNIFICANT CHANGE UP (ref 3.8–5.2)
RBC # FLD: 18.6 % — HIGH (ref 10.3–14.5)
SODIUM SERPL-SCNC: 139 MMOL/L — SIGNIFICANT CHANGE UP (ref 135–145)
WBC # BLD: 5.88 K/UL — SIGNIFICANT CHANGE UP (ref 3.8–10.5)
WBC # FLD AUTO: 5.88 K/UL — SIGNIFICANT CHANGE UP (ref 3.8–10.5)

## 2021-12-23 RX ORDER — IPRATROPIUM/ALBUTEROL SULFATE 18-103MCG
3 AEROSOL WITH ADAPTER (GRAM) INHALATION EVERY 6 HOURS
Refills: 0 | Status: DISCONTINUED | OUTPATIENT
Start: 2021-12-23 | End: 2021-12-27

## 2021-12-23 RX ADMIN — POLYETHYLENE GLYCOL 3350 17 GRAM(S): 17 POWDER, FOR SOLUTION ORAL at 12:47

## 2021-12-23 RX ADMIN — Medication 25 MILLIGRAM(S): at 05:39

## 2021-12-23 RX ADMIN — LEVETIRACETAM 500 MILLIGRAM(S): 250 TABLET, FILM COATED ORAL at 05:39

## 2021-12-23 RX ADMIN — Medication 25 MILLIGRAM(S): at 22:33

## 2021-12-23 RX ADMIN — Medication 3 MILLILITER(S): at 00:30

## 2021-12-23 RX ADMIN — ENOXAPARIN SODIUM 40 MILLIGRAM(S): 100 INJECTION SUBCUTANEOUS at 12:46

## 2021-12-23 RX ADMIN — CHLORHEXIDINE GLUCONATE 1 APPLICATION(S): 213 SOLUTION TOPICAL at 12:45

## 2021-12-23 RX ADMIN — Medication 3 MILLILITER(S): at 05:39

## 2021-12-23 RX ADMIN — Medication 25 MILLIGRAM(S): at 14:20

## 2021-12-23 RX ADMIN — LEVETIRACETAM 500 MILLIGRAM(S): 250 TABLET, FILM COATED ORAL at 17:41

## 2021-12-23 RX ADMIN — TIOTROPIUM BROMIDE AND OLODATEROL 2 PUFF(S): 3.124; 2.736 SPRAY, METERED RESPIRATORY (INHALATION) at 12:47

## 2021-12-23 RX ADMIN — Medication 3 MILLILITER(S): at 12:46

## 2021-12-23 NOTE — PROGRESS NOTE ADULT - SUBJECTIVE AND OBJECTIVE BOX
Follow-up Pulm Progress Note    No new respiratory events overnight.  Denies SOB/CP.     Medications:  MEDICATIONS  (STANDING):  albuterol/ipratropium for Nebulization 3 milliLiter(s) Nebulizer every 6 hours  chlorhexidine 4% Liquid 1 Application(s) Topical <User Schedule>  enoxaparin Injectable 40 milliGRAM(s) SubCutaneous daily  hydrALAZINE 25 milliGRAM(s) Oral every 8 hours  levETIRAcetam 500 milliGRAM(s) Oral two times a day  polyethylene glycol 3350 17 Gram(s) Oral daily  tiotropium 2.5 MICROgram(s)/olodaterol 2.5 MICROgram(s) (STIOLTO) Inhaler 2 Puff(s) Inhalation daily    MEDICATIONS  (PRN):          Vital Signs Last 24 Hrs  T(C): 36.8 (23 Dec 2021 10:43), Max: 37 (22 Dec 2021 17:52)  T(F): 98.3 (23 Dec 2021 10:43), Max: 98.6 (22 Dec 2021 17:52)  HR: 84 (23 Dec 2021 11:05) (67 - 87)  BP: 138/66 (23 Dec 2021 10:43) (138/66 - 156/66)  BP(mean): --  RR: 18 (23 Dec 2021 11:05) (17 - 18)  SpO2: 97% (23 Dec 2021 11:05) (95% - 98%)    ABG - ( 22 Dec 2021 05:25 )  pH, Arterial: 7.48  pH, Blood: x     /  pCO2: 52    /  pO2: 155   / HCO3: 39    / Base Excess: 13.0  /  SaO2: 99.9                  12-22 @ 07:01  -  12-23 @ 07:00  --------------------------------------------------------  IN: 960 mL / OUT: 1550 mL / NET: -590 mL          LABS:                        10.2   5.88  )-----------( 150      ( 23 Dec 2021 09:44 )             32.5     12-23    139  |  97  |  9   ----------------------------<  91  3.8   |  34<H>  |  0.45<L>    Ca    9.2      23 Dec 2021 09:45  Phos  3.6     12-23  Mg     2.2     12-23            CAPILLARY BLOOD GLUCOSE                            CULTURES: (if applicable)    Culture - Blood (collected 12-15-21 @ 18:57)  Source: .Blood Blood-Peripheral  Final Report (12-20-21 @ 19:01):    No Growth Final    Culture - Blood (collected 12-15-21 @ 08:55)  Source: .Blood Blood-Venous  Final Report (12-20-21 @ 09:00):    No Growth Final        Culture - Urine (collected 12-15-21 @ 09:17)  Source: Clean Catch Clean Catch (Midstream)  Final Report (12-18-21 @ 10:16):    >100,000 CFU/ml Escherichia coli  Organism: Escherichia coli (12-18-21 @ 10:16)  Organism: Escherichia coli (12-18-21 @ 10:16)      -  Amikacin: S <=16      -  Amoxicillin/Clavulanic Acid: S <=8/4      -  Ampicillin: R >16 These ampicillin results predict results for amoxicillin      -  Ampicillin/Sulbactam: S 8/4 Enterobacter, Klebsiella aerogenes, Citrobacter, and Serratia may develop resistance during prolonged therapy (3-4 days)      -  Aztreonam: S <=4      -  Cefazolin: S <=2 (MIC_CL_COM_ENTERIC_CEFAZU) For uncomplicated UTI with K. pneumoniae, E. coli, or P. mirablis: MARY <=16 is sensitive and MARY >=32 is resistant. This also predicts results for oral agents cefaclor, cefdinir, cefpodoxime, cefprozil, cefuroxime axetil, cephalexin and locarbef for uncomplicated UTI. Note that some isolates may be susceptible to these agents while testing resistant to cefazolin.      -  Cefepime: S <=2      -  Cefoxitin: S <=8      -  Ceftriaxone: S <=1 Enterobacter, Klebsiella aerogenes, Citrobacter, and Serratia may develop resistance during prolonged therapy      -  Ciprofloxacin: R >2      -  Ertapenem: S <=0.5      -  Gentamicin: S <=2      -  Imipenem: S <=1      -  Levofloxacin: R >4      -  Meropenem: S <=1      -  Nitrofurantoin: S <=32 Should not be used to treat pyelonephritis      -  Piperacillin/Tazobactam: S <=8      -  Tigecycline: S <=2      -  Tobramycin: S <=2      -  Trimethoprim/Sulfamethoxazole: S <=0.5/9.5      Method Type: MARY                                Physical Examination:  PULM: Clear to auscultation bilaterally, no significant sputum production  CVS: S1, S2 heard    RADIOLOGY REVIEWED  CXR:     CT chest:    TTE:   Follow-up Pulm Progress Note    No new respiratory events overnight.  Denies SOB/CP.     Medications:  MEDICATIONS  (STANDING):  albuterol/ipratropium for Nebulization 3 milliLiter(s) Nebulizer every 6 hours  chlorhexidine 4% Liquid 1 Application(s) Topical <User Schedule>  enoxaparin Injectable 40 milliGRAM(s) SubCutaneous daily  hydrALAZINE 25 milliGRAM(s) Oral every 8 hours  levETIRAcetam 500 milliGRAM(s) Oral two times a day  polyethylene glycol 3350 17 Gram(s) Oral daily  tiotropium 2.5 MICROgram(s)/olodaterol 2.5 MICROgram(s) (STIOLTO) Inhaler 2 Puff(s) Inhalation daily    MEDICATIONS  (PRN):          Vital Signs Last 24 Hrs  T(C): 36.8 (23 Dec 2021 10:43), Max: 37 (22 Dec 2021 17:52)  T(F): 98.3 (23 Dec 2021 10:43), Max: 98.6 (22 Dec 2021 17:52)  HR: 84 (23 Dec 2021 11:05) (67 - 87)  BP: 138/66 (23 Dec 2021 10:43) (138/66 - 156/66)  BP(mean): --  RR: 18 (23 Dec 2021 11:05) (17 - 18)  SpO2: 97% (23 Dec 2021 11:05) (95% - 98%)    ABG - ( 22 Dec 2021 05:25 )  pH, Arterial: 7.48  pH, Blood: x     /  pCO2: 52    /  pO2: 155   / HCO3: 39    / Base Excess: 13.0  /  SaO2: 99.9                  12-22 @ 07:01  -  12-23 @ 07:00  --------------------------------------------------------  IN: 960 mL / OUT: 1550 mL / NET: -590 mL          LABS:                        10.2   5.88  )-----------( 150      ( 23 Dec 2021 09:44 )             32.5     12-23    139  |  97  |  9   ----------------------------<  91  3.8   |  34<H>  |  0.45<L>    Ca    9.2      23 Dec 2021 09:45  Phos  3.6     12-23  Mg     2.2     12-23            CAPILLARY BLOOD GLUCOSE                            CULTURES: (if applicable)    Culture - Blood (collected 12-15-21 @ 18:57)  Source: .Blood Blood-Peripheral  Final Report (12-20-21 @ 19:01):    No Growth Final    Culture - Blood (collected 12-15-21 @ 08:55)  Source: .Blood Blood-Venous  Final Report (12-20-21 @ 09:00):    No Growth Final        Culture - Urine (collected 12-15-21 @ 09:17)  Source: Clean Catch Clean Catch (Midstream)  Final Report (12-18-21 @ 10:16):    >100,000 CFU/ml Escherichia coli  Organism: Escherichia coli (12-18-21 @ 10:16)  Organism: Escherichia coli (12-18-21 @ 10:16)      -  Amikacin: S <=16      -  Amoxicillin/Clavulanic Acid: S <=8/4      -  Ampicillin: R >16 These ampicillin results predict results for amoxicillin      -  Ampicillin/Sulbactam: S 8/4 Enterobacter, Klebsiella aerogenes, Citrobacter, and Serratia may develop resistance during prolonged therapy (3-4 days)      -  Aztreonam: S <=4      -  Cefazolin: S <=2 (MIC_CL_COM_ENTERIC_CEFAZU) For uncomplicated UTI with K. pneumoniae, E. coli, or P. mirablis: MARY <=16 is sensitive and MARY >=32 is resistant. This also predicts results for oral agents cefaclor, cefdinir, cefpodoxime, cefprozil, cefuroxime axetil, cephalexin and locarbef for uncomplicated UTI. Note that some isolates may be susceptible to these agents while testing resistant to cefazolin.      -  Cefepime: S <=2      -  Cefoxitin: S <=8      -  Ceftriaxone: S <=1 Enterobacter, Klebsiella aerogenes, Citrobacter, and Serratia may develop resistance during prolonged therapy      -  Ciprofloxacin: R >2      -  Ertapenem: S <=0.5      -  Gentamicin: S <=2      -  Imipenem: S <=1      -  Levofloxacin: R >4      -  Meropenem: S <=1      -  Nitrofurantoin: S <=32 Should not be used to treat pyelonephritis      -  Piperacillin/Tazobactam: S <=8      -  Tigecycline: S <=2      -  Tobramycin: S <=2      -  Trimethoprim/Sulfamethoxazole: S <=0.5/9.5      Method Type: MARY                                Physical Examination:  PULM: Clear to auscultation bilaterally, no significant sputum production  CVS: S1, S2 heard      RADIOLOGY REVIEWED  CT chest: < from: CT Chest No Cont (12.20.21 @ 19:04) >  FINDINGS:    LUNGS, AIRWAYS, AND PLEURA: No endobronchial lesions. Small bilateral   pleural effusions with adjacent compressive atelectasis, right greater   than left. There is subpleural reticulation in the left upper   lobe/lingula compatible with prior breast radiotherapy.    MEDIASTINUM/LYMPH NODES: No lymphadenopathy. Esophagus is patulous.    VESSELS: The main pulmonary artery is larger than the adjacent ascending   aorta which measures 3.2 cm. Three-vessel coronary artery calcifications.    HEART: Left atrial enlargement. Trace pericardial effusion.    CHEST WALL AND LOWER NECK: Left mastectomy and breast implant. Diffusely   enlarged thyroid.    VISUALIZED UPPER ABDOMEN: Unremarkable    BONES: Degenerative changes. Age indeterminate compression deformity of   the T12 vertebral body.    IMPRESSION:    1.  Small pleural effusions    2.  Enlarged main pulmonary artery, may be seen in the setting of   pulmonary arterial hypertension.    < end of copied text >

## 2021-12-23 NOTE — PROGRESS NOTE ADULT - SUBJECTIVE AND OBJECTIVE BOX
DATE OF SERVICE: 12-23-21 @ 10:16    Subjective: Patient seen and examined. No new events except as noted.     SUBJECTIVE/ROS:  Pt seen and examined early this am  nad     MEDICATIONS:  MEDICATIONS  (STANDING):  albuterol/ipratropium for Nebulization 3 milliLiter(s) Nebulizer every 6 hours  chlorhexidine 4% Liquid 1 Application(s) Topical <User Schedule>  enoxaparin Injectable 40 milliGRAM(s) SubCutaneous daily  hydrALAZINE 25 milliGRAM(s) Oral every 8 hours  levETIRAcetam 500 milliGRAM(s) Oral two times a day  polyethylene glycol 3350 17 Gram(s) Oral daily  tiotropium 2.5 MICROgram(s)/olodaterol 2.5 MICROgram(s) (STIOLTO) Inhaler 2 Puff(s) Inhalation daily      PHYSICAL EXAM:  T(C): 36.3 (12-23-21 @ 05:18), Max: 37 (12-22-21 @ 17:52)  HR: 82 (12-23-21 @ 06:25) (67 - 87)  BP: 138/74 (12-23-21 @ 05:18) (138/74 - 156/66)  RR: 18 (12-23-21 @ 05:18) (18 - 18)  SpO2: 97% (12-23-21 @ 06:25) (95% - 98%)  Wt(kg): --  I&O's Summary    22 Dec 2021 07:01  -  23 Dec 2021 07:00  --------------------------------------------------------  IN: 960 mL / OUT: 1550 mL / NET: -590 mL            JVP: Normal  Neck: supple  Lung: clear   CV: S1 S2 , Murmur:  Abd: soft  Ext: No edema  neuro: Awake / alert  Psych: flat affect  Skin: normal``    LABS/DATA:    CARDIAC MARKERS:                                10.2   5.88  )-----------( 150      ( 23 Dec 2021 09:44 )             32.5     12-22    138  |  96  |  10  ----------------------------<  90  3.6   |  32<H>  |  0.50    Ca    9.6      22 Dec 2021 06:51      proBNP:   Lipid Profile:   HgA1c:   TSH:     TELE:  EKG:

## 2021-12-23 NOTE — PROGRESS NOTE ADULT - ASSESSMENT
Patient is an 85 y/o F with PMH of acromegaly, HTN, breast CA s/p L mastectomy. Presents to ED with c/o fatigue and bradycardia. Found to have +UA, started on abx. 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 MICU consulted for hypercapnia with decreased mentation. Repeat ABG with ongoing hypercapnia but stable pH. 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, on Keppra for seizure ppx ever since  - GCS score 11 12/15 ->15 12/16; CTH 12/14 had chronic b/l basal ganglia details  - likely metabolic encephalopathy 2/2 sepsis 2/2 UTI, also likely has some underlying chronic hypercapnia with possible acuity  - c/w Keppra 500 mg q12  - neuro checks  - neurology consulted, appreciate recs  - will plan for eventual MRI brain no con, rEEG  - holding home memantine due to side effect of bradycardia  - will send folate, b12, TSH, a1c, MMA, iron panel; F/U results   -- Patient will need eventual MR Brain     #Bradycardia   - pt was hasmukh to upper 30's during RRT on 12/15 evening, later came up 45-50. EKG showed sinus hasmukh, no AV block.  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   --Overnight 12/23, patient was noted to have 10 beats of PAT lasting 3.2 seconds on telemonitor.  --Per cards -- currently off of BB due to bradycardia   --F/U Cardiology recs      #Acute Respiratory failure with hypercapnia   --? Acute on chronic hypercapnia or Secondary to underlying infectious cause   --Pt has history of ISA, has not used CPAP in >10 years per pulmonary   --Pulmonary is on board; appreciate their care; F/U recommendations   --ABG during RRT 12/15 7.28, pCO2 78, pO2 126, HCO3 37; VBG later showed pH 7.17, pCO2 85, pO2 38, HCO3 31  -- Juan has underlying chronic hypercapnia  --S/P MICU now on floor   --Pulmonary is on board; appreciate their care; F/U recommendations   --Per pulmonary, maintain continuous pulse ox, keep O2 > 90%, wean O2 as able to, continue with ABx for UTI, bronchodilators, check bedside spirometry, Observe off of AVAPs overnight 12/20  --Per pulm, patient will need NIV at home; Bipap started, settings as per pulm  --Continue to monitor O2 Saturations on RA at rest as well as with ambulation, supplement O2 PRN  -- Overnight Oximetry study 12/22  -- CT Chest non-contrast as ordered by pulmonology; Results as above; F/U recommendations       #COPD  --F/U Pulmonary recommendations   --Continue with Bipap  --Stiolto 2 puffs qd  -Duoneb q6h  -Eventual outpatient PFTs     #Sepsis due to UTI  --Many bacteria on UA, + LE, moderate nitrite  --Monitor CBC and temperature curve   --Likely contributing to current lethargy  --Blood culture X2 with no growth  --Urine culture is + for E. coli, S/P  Abx   --Course of ABx with Zosyn was completed on 12/22     #Thrombocytopenia   --platelets 166 -> 125  - maintain active T&S  --Monitor CBC  --Platelets noted to be 150 today 12/21    #HTN  --Holding home  metoprolol in the setting of bradycardia   --Cardiology on board; F/U recommendations  --On Hydralazine 25, will continue   --Continue with close monitoring of BP and adjust medications as needed      #History of acromegaly  --Patient's PCP Dr. Julian and Dr. Paulino  --Continue Keppra 500 BID  --Seizure prophylaxis       - passed dysphagia eval 10/16 -> ordered for diet  - monitor glucose        #PPx  DVT: lovenox    Discharge planning as per Pulmonology; Planning for Rehab

## 2021-12-23 NOTE — PROVIDER CONTACT NOTE (OTHER) - BACKGROUND
pt admitted for fatigue, weakness and bradycardia
Pt presented with fatigue, weakness, and bradycardia. RRT for mental status changes on 12/15. ABG with respiratory acidosis, ganglia infarcts. MICU consulted for hypercapnia with decreased mentation.

## 2021-12-23 NOTE — PROGRESS NOTE ADULT - ASSESSMENT
Hypercapnic resp failure   work up and tx as per ICU  much improved  pulm eval     episode of PAT, NSVT  off BB due to bradycardia   likely stress induced given her pulm status     Bradycardia  stable    HTN  stable  cont current meds

## 2021-12-23 NOTE — PROVIDER CONTACT NOTE (OTHER) - ACTION/TREATMENT ORDERED:
Provider ordered EKG and lab work to be sent. Continue to monitor.
KIRAN Isaac aware, AM Labs ordererd - mg/phos, cbc, bmp, will continue to monitor

## 2021-12-23 NOTE — PROGRESS NOTE ADULT - PROBLEM SELECTOR PLAN 1
acute on chronic hypercapnia - likely 2nd to hypoventilation syndrome .  -S/p MICU stay, initially failed bipap, requiring AVAPS initially. Now tx to floor.  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 Eventual MR head?  -S/p abx for UTI  -Overnight oximetry done on 2LNC with no episodes of desaturation.   -Bedside spirometry with FEV1/FVC 84  -Monitored off NIV overnight 12/20-12/21 ABG 7.42/59/108/38. Wore bipap 15/5/30% overnight 12/21-22 ABG 7.48/52/155/39.   -Will need NIV at home, 12/5/30%   -Check o2 sats on RA at rest and with ambulation. Keep sats >90% with O2 PRN.   -Call made to [facility], d/w case management

## 2021-12-23 NOTE — PROGRESS NOTE ADULT - SUBJECTIVE AND OBJECTIVE BOX
Name of Patient : TONIA MAJANO  MRN: [id number]  Date of visit: 12-23-21 @ 13:34      Subjective: Patient seen and examined. No new events except as noted.   Patient seen early this morning. Overnight, patient was noted to have 10 beats of PAT lasting 3.2 seconds on telemonitor. Patient denies chest pain, shortness of breath or palpitation at time of visit this morning.   Patient is saturating 97% on 2L NC.     REVIEW OF SYSTEMS:    CONSTITUTIONAL: No weakness, fevers or chills  EYES/ENT: No visual changes;  No vertigo or throat pain   NECK: No pain or stiffness  RESPIRATORY: No cough, wheezing, hemoptysis; No shortness of breath  CARDIOVASCULAR: No chest pain or palpitations  GASTROINTESTINAL: No abdominal or epigastric pain. No nausea, vomiting, or hematemesis; No diarrhea or constipation. No melena or hematochezia.  GENITOURINARY: No dysuria, frequency or hematuria  NEUROLOGICAL: No numbness or weakness  SKIN: No itching, burning, rashes, or lesions   All other review of systems is negative unless indicated above.    MEDICATIONS:  MEDICATIONS  (STANDING):  chlorhexidine 4% Liquid 1 Application(s) Topical <User Schedule>  enoxaparin Injectable 40 milliGRAM(s) SubCutaneous daily  hydrALAZINE 25 milliGRAM(s) Oral every 8 hours  levETIRAcetam 500 milliGRAM(s) Oral two times a day  polyethylene glycol 3350 17 Gram(s) Oral daily  tiotropium 2.5 MICROgram(s)/olodaterol 2.5 MICROgram(s) (STIOLTO) Inhaler 2 Puff(s) Inhalation daily      PHYSICAL EXAM:  T(C): 36.8 (12-23-21 @ 10:43), Max: 37 (12-22-21 @ 17:52)  HR: 84 (12-23-21 @ 11:05) (67 - 87)  BP: 138/66 (12-23-21 @ 10:43) (138/66 - 156/66)  RR: 18 (12-23-21 @ 11:05) (17 - 18)  SpO2: 97% (12-23-21 @ 11:05) (96% - 98%)  Wt(kg): --  I&O's Summary    22 Dec 2021 07:01  -  23 Dec 2021 07:00  --------------------------------------------------------  IN: 960 mL / OUT: 1550 mL / NET: -590 mL    23 Dec 2021 07:01  -  23 Dec 2021 13:34  --------------------------------------------------------  IN: 470 mL / OUT: 450 mL / NET: 20 mL          Appearance: Normal	  HEENT:  PERRL; on Nasal cannula   Lymphatic: No lymphadenopathy   Cardiovascular: Normal S1 S2, no JVD  Respiratory: normal effort , clear  Gastrointestinal:  Soft, Non-tender  Skin: No rashes,  warm to touch  Psychiatry:  Mood & affect appropriate  Musculoskeletal: No edema      All labs, Imaging and EKGs personally reviewed       12-22-21 @ 07:01  -  12-23-21 @ 07:00  --------------------------------------------------------  IN: 960 mL / OUT: 1550 mL / NET: -590 mL    12-23-21 @ 07:01  -  12-23-21 @ 13:34  --------------------------------------------------------  IN: 470 mL / OUT: 450 mL / NET: 20 mL                                10.2   5.88  )-----------( 150      ( 23 Dec 2021 09:44 )             32.5               12-23    139  |  97  |  9   ----------------------------<  91  3.8   |  34<H>  |  0.45<L>    Ca    9.2      23 Dec 2021 09:45  Phos  3.6     12-23  Mg     2.2     12-23                       ABG - ( 22 Dec 2021 05:25 )  pH, Arterial: 7.48  pH, Blood: x     /  pCO2: 52    /  pO2: 155   / HCO3: 39    / Base Excess: 13.0  /  SaO2: 99.9

## 2021-12-23 NOTE — PROVIDER CONTACT NOTE (OTHER) - ASSESSMENT
VVS. A&O x4. pt did not c/o pain or discomfort. Pt was soundly asleep when tele event occurred.
pt denies chest pain, sob, dizziness, or palpitations. VSS, see flowsheet

## 2021-12-23 NOTE — PROVIDER CONTACT NOTE (OTHER) - SITUATION
pt had a tele event. 4 beats of multi focal wide complex tachycardia
RN notified by [employer] tech Sánchez MCKEON at this time, pt had 10 beats PATs lasting 3.2 seconds w/ HR 83 bpm at 04:21am.

## 2021-12-24 LAB
ANION GAP SERPL CALC-SCNC: 9 MMOL/L — SIGNIFICANT CHANGE UP (ref 5–17)
BUN SERPL-MCNC: 10 MG/DL — SIGNIFICANT CHANGE UP (ref 7–23)
CALCIUM SERPL-MCNC: 9 MG/DL — SIGNIFICANT CHANGE UP (ref 8.4–10.5)
CHLORIDE SERPL-SCNC: 98 MMOL/L — SIGNIFICANT CHANGE UP (ref 96–108)
CO2 SERPL-SCNC: 31 MMOL/L — SIGNIFICANT CHANGE UP (ref 22–31)
CREAT SERPL-MCNC: 0.48 MG/DL — LOW (ref 0.5–1.3)
GLUCOSE SERPL-MCNC: 91 MG/DL — SIGNIFICANT CHANGE UP (ref 70–99)
HCT VFR BLD CALC: 28.7 % — LOW (ref 34.5–45)
HGB BLD-MCNC: 9.4 G/DL — LOW (ref 11.5–15.5)
MCHC RBC-ENTMCNC: 24 PG — LOW (ref 27–34)
MCHC RBC-ENTMCNC: 32.8 GM/DL — SIGNIFICANT CHANGE UP (ref 32–36)
MCV RBC AUTO: 73.4 FL — LOW (ref 80–100)
NRBC # BLD: 0 /100 WBCS — SIGNIFICANT CHANGE UP (ref 0–0)
PLATELET # BLD AUTO: 150 K/UL — SIGNIFICANT CHANGE UP (ref 150–400)
POTASSIUM SERPL-MCNC: 3.9 MMOL/L — SIGNIFICANT CHANGE UP (ref 3.5–5.3)
POTASSIUM SERPL-SCNC: 3.9 MMOL/L — SIGNIFICANT CHANGE UP (ref 3.5–5.3)
RBC # BLD: 3.91 M/UL — SIGNIFICANT CHANGE UP (ref 3.8–5.2)
RBC # FLD: 18.6 % — HIGH (ref 10.3–14.5)
SODIUM SERPL-SCNC: 138 MMOL/L — SIGNIFICANT CHANGE UP (ref 135–145)
WBC # BLD: 5.54 K/UL — SIGNIFICANT CHANGE UP (ref 3.8–10.5)
WBC # FLD AUTO: 5.54 K/UL — SIGNIFICANT CHANGE UP (ref 3.8–10.5)

## 2021-12-24 RX ORDER — LANOLIN ALCOHOL/MO/W.PET/CERES
3 CREAM (GRAM) TOPICAL AT BEDTIME
Refills: 0 | Status: DISCONTINUED | OUTPATIENT
Start: 2021-12-24 | End: 2021-12-27

## 2021-12-24 RX ORDER — ACETAMINOPHEN 500 MG
650 TABLET ORAL EVERY 6 HOURS
Refills: 0 | Status: DISCONTINUED | OUTPATIENT
Start: 2021-12-24 | End: 2021-12-27

## 2021-12-24 RX ADMIN — LEVETIRACETAM 500 MILLIGRAM(S): 250 TABLET, FILM COATED ORAL at 17:50

## 2021-12-24 RX ADMIN — POLYETHYLENE GLYCOL 3350 17 GRAM(S): 17 POWDER, FOR SOLUTION ORAL at 13:24

## 2021-12-24 RX ADMIN — Medication 25 MILLIGRAM(S): at 13:24

## 2021-12-24 RX ADMIN — Medication 25 MILLIGRAM(S): at 21:39

## 2021-12-24 RX ADMIN — CHLORHEXIDINE GLUCONATE 1 APPLICATION(S): 213 SOLUTION TOPICAL at 13:22

## 2021-12-24 RX ADMIN — LEVETIRACETAM 500 MILLIGRAM(S): 250 TABLET, FILM COATED ORAL at 07:08

## 2021-12-24 RX ADMIN — TIOTROPIUM BROMIDE AND OLODATEROL 2 PUFF(S): 3.124; 2.736 SPRAY, METERED RESPIRATORY (INHALATION) at 13:23

## 2021-12-24 RX ADMIN — ENOXAPARIN SODIUM 40 MILLIGRAM(S): 100 INJECTION SUBCUTANEOUS at 13:22

## 2021-12-24 RX ADMIN — Medication 3 MILLILITER(S): at 13:23

## 2021-12-24 RX ADMIN — Medication 25 MILLIGRAM(S): at 07:07

## 2021-12-24 NOTE — PROGRESS NOTE ADULT - SUBJECTIVE AND OBJECTIVE BOX
DATE OF SERVICE: 12-24-21 @ 10:34    Subjective: Patient seen and examined. No new events except as noted.     SUBJECTIVE/ROS:  Pt seen and examined early this am  nad       MEDICATIONS:  MEDICATIONS  (STANDING):  chlorhexidine 4% Liquid 1 Application(s) Topical <User Schedule>  enoxaparin Injectable 40 milliGRAM(s) SubCutaneous daily  hydrALAZINE 25 milliGRAM(s) Oral every 8 hours  levETIRAcetam 500 milliGRAM(s) Oral two times a day  polyethylene glycol 3350 17 Gram(s) Oral daily  tiotropium 2.5 MICROgram(s)/olodaterol 2.5 MICROgram(s) (STIOLTO) Inhaler 2 Puff(s) Inhalation daily      PHYSICAL EXAM:  T(C): 36.7 (12-24-21 @ 05:51), Max: 37.1 (12-23-21 @ 21:35)  HR: 64 (12-24-21 @ 06:59) (52 - 86)  BP: 148/70 (12-24-21 @ 05:51) (123/63 - 166/66)  RR: 18 (12-24-21 @ 05:51) (17 - 18)  SpO2: 97% (12-24-21 @ 06:59) (89% - 98%)  Wt(kg): --  I&O's Summary    23 Dec 2021 07:01  -  24 Dec 2021 07:00  --------------------------------------------------------  IN: 1050 mL / OUT: 1650 mL / NET: -600 mL            JVP: Normal  Neck: supple  Lung: clear   CV: S1 S2 , Murmur:  Abd: soft  Ext: No edema  neuro: Awake / alert  Psych: flat affect  Skin: normal``    LABS/DATA:    CARDIAC MARKERS:                                9.4    5.54  )-----------( 150      ( 24 Dec 2021 06:48 )             28.7     12-24    138  |  98  |  10  ----------------------------<  91  3.9   |  31  |  0.48<L>    Ca    9.0      24 Dec 2021 06:48  Phos  3.6     12-23  Mg     2.2     12-23      proBNP:   Lipid Profile:   HgA1c:   TSH:     TELE:  EKG:

## 2021-12-24 NOTE — PROGRESS NOTE ADULT - ASSESSMENT
Patient is an 87 y/o F with PMH of acromegaly, HTN, breast CA s/p L mastectomy. Presents to ED with c/o fatigue and bradycardia. Found to have +UA, started on abx. 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 MICU consulted for hypercapnia with decreased mentation. Repeat ABG with ongoing hypercapnia but stable pH. 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, on Keppra for seizure ppx ever since  - GCS score 11 12/15 ->15 12/16; CTH 12/14 had chronic b/l basal ganglia details  - likely metabolic encephalopathy 2/2 sepsis 2/2 UTI, also likely has some underlying chronic hypercapnia with possible acuity  - c/w Keppra 500 mg q12  - neuro checks  - neurology consulted, appreciate recs  - will plan for eventual MRI brain no con, rEEG  - holding home memantine due to side effect of bradycardia  - Check folate, b12, TSH, a1c, MMA, iron panel; per ICU   -- Patient will need eventual MR Brain     #Bradycardia   - pt was hasmukh to upper 30's during RRT on 12/15 evening, later came up 45-50. EKG showed sinus hasmukh, no AV block.  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   --Overnight 12/23, patient was noted to have 10 beats of PAT lasting 3.2 seconds on telemonitor.  --Per cards -- currently off of BB due to bradycardia   --F/U Cardiology recs-- likely stress induced per cards       #Acute Respiratory failure with hypercapnia   --? Acute on chronic hypercapnia or Secondary to underlying infectious cause   --Pt has history of ISA, has not used CPAP in >10 years per pulmonary   --Pulmonary is on board; appreciate their care; F/U recommendations   --ABG during RRT 12/15 7.28, pCO2 78, pO2 126, HCO3 37; VBG later showed pH 7.17, pCO2 85, pO2 38, HCO3 31  -- Juan has underlying chronic hypercapnia  --S/P MICU now on floor   --Pulmonary is on board; appreciate their care; F/U recommendations   --Per pulmonary, maintain continuous pulse ox, keep O2 > 90%, wean O2 as able to, continue with ABx for UTI, bronchodilators, check bedside spirometry, Observe off of AVAPs overnight 12/20  --Per pulm, patient will need NIV at home; Bipap started, settings as per pulm  -- Overnight Oximetry study 12/22  -- CT Chest non-contrast as ordered by pulmonology; Results as above; F/U recommendations   --Continue to monitor O2 Saturations on RA at rest as well as with ambulation, supplement O2 PRN      #COPD  --F/U Pulmonary recommendations   --Continue with Bipap per pulmonology   --Stiolto 2 puffs qd  -Duoneb q6h  -Eventual outpatient PFTs     #Sepsis due to UTI  --Many bacteria on UA, + LE, moderate nitrite  --Monitor CBC and temperature curve   --Likely contributing to current lethargy  --Blood culture X2 with no growth  --Urine culture is + for E. coli, S/P  Abx   --Course of ABx with Zosyn was completed on 12/22     #Thrombocytopenia   --platelets 166 -> 125  - maintain active T&S  --Monitor CBC  --Platelets noted to be 150 today 12/24    #HTN  --Holding home  metoprolol in the setting of bradycardia   --Cardiology on board; F/U recommendations  --On Hydralazine 25, will continue   --Continue with close monitoring of BP and adjust medications as needed      #History of acromegaly  --Patient's PCP Dr. Julian and Dr. Paulino  --Continue Keppra 500 BID  --Seizure prophylaxis       - passed dysphagia eval 10/16 -> ordered for diet  - monitor glucose        #PPx  DVT: lovenox    Discharge planning as per Pulmonology; Planning for Rehab

## 2021-12-24 NOTE — PROGRESS NOTE ADULT - ASSESSMENT
Hypercapnic resp failure   work up and tx as per ICU  much improved  pulm eval     episode of PAT, NSVT  off BB due to bradycardia   likely stress induced given her pulm status     Bradycardia  stable    HTN  stable  cont current meds     outpt follow up

## 2021-12-24 NOTE — PROGRESS NOTE ADULT - PROBLEM SELECTOR PLAN 1
acute on chronic hypercapnia - likely 2nd to hypoventilation syndrome .  -S/p MICU stay, initially failed bipap, requiring AVAPS initially. Now tx to floor.  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 Eventual MR head?  -S/p abx for UTI  -Overnight oximetry done on 2LNC with no episodes of desaturation.   -Bedside spirometry with FEV1/FVC 84  -Monitored off NIV overnight 12/20-12/21 ABG 7.42/59/108/38. Wore bipap 15/5/30% overnight 12/21-22 ABG 7.48/52/155/39.   -Will need NIV at home, 12/5/30%   -Check o2 sats on RA at rest and with ambulation. Keep sats >90% with O2 PRN.   -Call made to [facility], d/w case management on 12/23

## 2021-12-24 NOTE — PROGRESS NOTE ADULT - SUBJECTIVE AND OBJECTIVE BOX
Follow-up Pulm Progress Note    Bipap worn overnight   No new respiratory events overnight.  Denies SOB/CP.   Sats 97% 2LNC     Medications:  MEDICATIONS  (STANDING):  chlorhexidine 4% Liquid 1 Application(s) Topical <User Schedule>  enoxaparin Injectable 40 milliGRAM(s) SubCutaneous daily  hydrALAZINE 25 milliGRAM(s) Oral every 8 hours  levETIRAcetam 500 milliGRAM(s) Oral two times a day  polyethylene glycol 3350 17 Gram(s) Oral daily  tiotropium 2.5 MICROgram(s)/olodaterol 2.5 MICROgram(s) (STIOLTO) Inhaler 2 Puff(s) Inhalation daily    MEDICATIONS  (PRN):  albuterol/ipratropium for Nebulization 3 milliLiter(s) Nebulizer every 6 hours PRN Shortness of Breath and/or Wheezing          Vital Signs Last 24 Hrs  T(C): 36.7 (24 Dec 2021 05:51), Max: 37.1 (23 Dec 2021 21:35)  T(F): 98.1 (24 Dec 2021 05:51), Max: 98.8 (23 Dec 2021 21:35)  HR: 64 (24 Dec 2021 06:59) (52 - 86)  BP: 148/70 (24 Dec 2021 05:51) (123/63 - 166/66)  BP(mean): --  RR: 18 (24 Dec 2021 05:51) (17 - 18)  SpO2: 97% (24 Dec 2021 06:59) (89% - 98%)          12-23 @ 07:01  -  12-24 @ 07:00  --------------------------------------------------------  IN: 1050 mL / OUT: 1650 mL / NET: -600 mL          LABS:                        9.4    5.54  )-----------( 150      ( 24 Dec 2021 06:48 )             28.7     12-24    138  |  98  |  10  ----------------------------<  91  3.9   |  31  |  0.48<L>    Ca    9.0      24 Dec 2021 06:48  Phos  3.6     12-23  Mg     2.2     12-23            CULTURES:     Culture - Blood (collected 12-15-21 @ 18:57)  Source: .Blood Blood-Peripheral  Final Report (12-20-21 @ 19:01):    No Growth Final    Culture - Blood (collected 12-15-21 @ 08:55)  Source: .Blood Blood-Venous  Final Report (12-20-21 @ 09:00):    No Growth Final        Culture - Urine (collected 12-15-21 @ 09:17)  Source: Clean Catch Clean Catch (Midstream)  Final Report (12-18-21 @ 10:16):    >100,000 CFU/ml Escherichia coli  Organism: Escherichia coli (12-18-21 @ 10:16)  Organism: Escherichia coli (12-18-21 @ 10:16)      -  Amikacin: S <=16      -  Amoxicillin/Clavulanic Acid: S <=8/4      -  Ampicillin: R >16 These ampicillin results predict results for amoxicillin      -  Ampicillin/Sulbactam: S 8/4 Enterobacter, Klebsiella aerogenes, Citrobacter, and Serratia may develop resistance during prolonged therapy (3-4 days)      -  Aztreonam: S <=4      -  Cefazolin: S <=2 (MIC_CL_COM_ENTERIC_[id number]) For uncomplicated UTI with K. pneumoniae, E. coli, or P. mirablis: MARY <=16 is sensitive and MARY >=32 is resistant. This also predicts results for oral agents cefaclor, cefdinir, cefpodoxime, cefprozil, cefuroxime axetil, cephalexin and locarbef for uncomplicated UTI. Note that some isolates may be susceptible to these agents while testing resistant to cefazolin.      -  Cefepime: S <=2      -  Cefoxitin: S <=8      -  Ceftriaxone: S <=1 Enterobacter, Klebsiella aerogenes, Citrobacter, and Serratia may develop resistance during prolonged therapy      -  Ciprofloxacin: R >2      -  Ertapenem: S <=0.5      -  Gentamicin: S <=2      -  Imipenem: S <=1      -  Levofloxacin: R >4      -  Meropenem: S <=1      -  Nitrofurantoin: S <=32 Should not be used to treat pyelonephritis      -  Piperacillin/Tazobactam: S <=8      -  Tigecycline: S <=2      -  Tobramycin: S <=2      -  Trimethoprim/Sulfamethoxazole: S <=0.5/9.5      Method Type: MARY      Physical Examination:  PULM: Clear to auscultation bilaterally, no significant sputum production  CVS: S1, S2 heard      RADIOLOGY REVIEWED  CT chest: < from: CT Chest No Cont (12.20.21 @ 19:04) >  FINDINGS:    LUNGS, AIRWAYS, AND PLEURA: No endobronchial lesions. Small bilateral   pleural effusions with adjacent compressive atelectasis, right greater   than left. There is subpleural reticulation in the left upper   lobe/lingula compatible with prior breast radiotherapy.    MEDIASTINUM/LYMPH NODES: No lymphadenopathy. Esophagus is patulous.    VESSELS: The main pulmonary artery is larger than the adjacent ascending   aorta which measures 3.2 cm. Three-vessel coronary artery calcifications.    HEART: Left atrial enlargement. Trace pericardial effusion.    CHEST WALL AND LOWER NECK: Left mastectomy and breast implant. Diffusely   enlarged thyroid.    VISUALIZED UPPER ABDOMEN: Unremarkable    BONES: Degenerative changes. Age indeterminate compression deformity of   the T12 vertebral body.    IMPRESSION:    1.  Small pleural effusions    2.  Enlarged main pulmonary artery, may be seen in the setting of   pulmonary arterial hypertension.    < end of copied text >

## 2021-12-24 NOTE — PROGRESS NOTE ADULT - SUBJECTIVE AND OBJECTIVE BOX
Name of Patient : TONIA MAJANO  MRN: [id number]  Date of visit: 12-24-21 @ 16:33      Subjective: Patient seen and examined. No new events except as noted.   Patient seen early this AM. Doing okay, Denies chest pain or SOB. Saturating 95% on room air. Patient reports that she has been using the incentive spirometer.     REVIEW OF SYSTEMS:    CONSTITUTIONAL: No weakness, fevers or chills  EYES/ENT: No visual changes;  No vertigo or throat pain   NECK: No pain or stiffness  RESPIRATORY: No cough, wheezing, hemoptysis; No shortness of breath  CARDIOVASCULAR: No chest pain or palpitations  GASTROINTESTINAL: No abdominal or epigastric pain. No nausea, vomiting, or hematemesis; No diarrhea or constipation. No melena or hematochezia.  GENITOURINARY: No dysuria, frequency or hematuria  NEUROLOGICAL: No numbness or weakness  SKIN: No itching, burning, rashes, or lesions   All other review of systems is negative unless indicated above.    MEDICATIONS:  MEDICATIONS  (STANDING):  chlorhexidine 4% Liquid 1 Application(s) Topical <User Schedule>  enoxaparin Injectable 40 milliGRAM(s) SubCutaneous daily  hydrALAZINE 25 milliGRAM(s) Oral every 8 hours  levETIRAcetam 500 milliGRAM(s) Oral two times a day  polyethylene glycol 3350 17 Gram(s) Oral daily  tiotropium 2.5 MICROgram(s)/olodaterol 2.5 MICROgram(s) (STIOLTO) Inhaler 2 Puff(s) Inhalation daily      PHYSICAL EXAM:  T(C): 36.9 (12-24-21 @ 13:32), Max: 37.1 (12-23-21 @ 21:35)  HR: 86 (12-24-21 @ 15:43) (52 - 86)  BP: 123/64 (12-24-21 @ 13:32) (123/63 - 166/66)  RR: 17 (12-24-21 @ 13:32) (17 - 18)  SpO2: 95% (12-24-21 @ 15:43) (93% - 98%)  Wt(kg): --  I&O's Summary    23 Dec 2021 07:01  -  24 Dec 2021 07:00  --------------------------------------------------------  IN: 1050 mL / OUT: 1650 mL / NET: -600 mL    24 Dec 2021 07:01  -  24 Dec 2021 16:33  --------------------------------------------------------  IN: 440 mL / OUT: 400 mL / NET: 40 mL          Appearance: Normal	  HEENT:  PERRL; on Nasal cannula   Lymphatic: No lymphadenopathy   Cardiovascular: Normal S1 S2, no JVD  Respiratory: normal effort , clear  Gastrointestinal:  Soft, Non-tender  Skin: No rashes,  warm to touch  Psychiatry:  Mood & affect appropriate  Musculoskeletal: No edema    All labs, Imaging and EKGs personally reviewed         12-23-21 @ 07:01  -  12-24-21 @ 07:00  --------------------------------------------------------  IN: 1050 mL / OUT: 1650 mL / NET: -600 mL    12-24-21 @ 07:01  -  12-24-21 @ 16:33  --------------------------------------------------------  IN: 440 mL / OUT: 400 mL / NET: 40 mL                            9.4    5.54  )-----------( 150      ( 24 Dec 2021 06:48 )             28.7               12-24    138  |  98  |  10  ----------------------------<  91  3.9   |  31  |  0.48<L>    Ca    9.0      24 Dec 2021 06:48  Phos  3.6     12-23  Mg     2.2     12-23

## 2021-12-25 LAB
A1C WITH ESTIMATED AVERAGE GLUCOSE RESULT: 5.4 % — SIGNIFICANT CHANGE UP (ref 4–5.6)
ESTIMATED AVERAGE GLUCOSE: 108 MG/DL — SIGNIFICANT CHANGE UP (ref 68–114)
FERRITIN SERPL-MCNC: 121 NG/ML — SIGNIFICANT CHANGE UP (ref 15–150)
FOLATE SERPL-MCNC: 5.2 NG/ML — SIGNIFICANT CHANGE UP
IRON SATN MFR SERPL: 21 % — SIGNIFICANT CHANGE UP (ref 14–50)
IRON SATN MFR SERPL: 53 UG/DL — SIGNIFICANT CHANGE UP (ref 30–160)
TIBC SERPL-MCNC: 250 UG/DL — SIGNIFICANT CHANGE UP (ref 220–430)
TSH SERPL-MCNC: 1.06 UIU/ML — SIGNIFICANT CHANGE UP (ref 0.27–4.2)
UIBC SERPL-MCNC: 197 UG/DL — SIGNIFICANT CHANGE UP (ref 110–370)
VIT B12 SERPL-MCNC: 518 PG/ML — SIGNIFICANT CHANGE UP (ref 232–1245)

## 2021-12-25 RX ADMIN — LEVETIRACETAM 500 MILLIGRAM(S): 250 TABLET, FILM COATED ORAL at 17:57

## 2021-12-25 RX ADMIN — Medication 25 MILLIGRAM(S): at 06:39

## 2021-12-25 RX ADMIN — POLYETHYLENE GLYCOL 3350 17 GRAM(S): 17 POWDER, FOR SOLUTION ORAL at 14:04

## 2021-12-25 RX ADMIN — Medication 25 MILLIGRAM(S): at 21:13

## 2021-12-25 RX ADMIN — Medication 25 MILLIGRAM(S): at 14:05

## 2021-12-25 RX ADMIN — CHLORHEXIDINE GLUCONATE 1 APPLICATION(S): 213 SOLUTION TOPICAL at 14:41

## 2021-12-25 RX ADMIN — LEVETIRACETAM 500 MILLIGRAM(S): 250 TABLET, FILM COATED ORAL at 06:39

## 2021-12-25 RX ADMIN — ENOXAPARIN SODIUM 40 MILLIGRAM(S): 100 INJECTION SUBCUTANEOUS at 14:04

## 2021-12-25 RX ADMIN — TIOTROPIUM BROMIDE AND OLODATEROL 2 PUFF(S): 3.124; 2.736 SPRAY, METERED RESPIRATORY (INHALATION) at 14:05

## 2021-12-25 NOTE — PROGRESS NOTE ADULT - ATTENDING COMMENTS
Pt care and plan discussed and reviewed with PA. Plan as outlined above edited by me to reflect our discussion. Thirty five minutes spent on encounter, of which more than fifty percent of the encounter was spent on counseling and/or coordinating care by the attending physician. OMT on six regions for acute somatic dysfunctions done at the bedside
Pt care and plan discussed and reviewed with PA. Plan as outlined above edited by me to reflect our discussion. Thirty five minutes spent on encounter, of which more than fifty percent of the encounter was spent on counseling and/or coordinating care by the attending physician. OMT on six regions for acute somatic dysfunctions done at the bedside
Pt care and plan discussed and reviewed with PA. Plan as outlined above edited by me to reflect our discussion. Thirty five minutes spent on encounter, of which more than fifty percent of the encounter was spent on counseling and/or coordinating care by the attending physician. OMT on six regions for acute somatic dysfunctions done at the bedside. Advanced care planning/advanced directives discussed with patient/family. DNR status including forceful chest compressions to attempt to restart the heart, ventilator support/artificial breathing, electric shock, artificial nutrition, health care proxy, Molst form all discussed with pt. Pt wishes to consider.    Discussed with patient's son
Pt seen and examined. 86 F with medical hx as noted above, now with acute on chronic resp failure with hypercapnia and  acute metabolic encephalopathy in the setting of a UTI. Likely underlying ISA/OHS. Off AVAPs since 6 am, now lethargic, unable to answer Qs and has difficulty keeping eyes open. Repeat ABG with worsening resp acidosis. AVAPS support resumed, FUP ABG on AVAPS. Will require intubation if worsens. Former smoker, no obvious evidence of COPD exacerbation. Start bronchodilators. CT head w/o any acute changes but shows chronic BG infarcts. non focal on exam. Mental status changes are all likely to hypercapnia and UTI. Currently on VEEG monitoring, FUP read. Cont Zosyn IV for UTI,. FUP Cxs. Stable hemodynamics, now hypertensive, place NGT and start hydralazine TID. Bradycardia now resolved, FUP TFTs. Cr stable, cont to follow UO/ electrolytes. Overall prognosis extremely guarded. GOC addressed with son, remains full code.
Pt care and plan discussed and reviewed with PA. Plan as outlined above edited by me to reflect our discussion. Thirty five minutes spent on encounter, of which more than fifty percent of the encounter was spent on counseling and/or coordinating care by the attending physician. OMT on six regions for acute somatic dysfunctions done at the bedside.
Pt care and plan discussed and reviewed with PA. Plan as outlined above edited by me to reflect our discussion. Thirty five minutes spent on encounter, of which more than fifty percent of the encounter was spent on counseling and/or coordinating care by the attending physician. OMT on six regions for acute somatic dysfunctions done at the bedside. Advanced care planning/advanced directives discussed with patient/family. DNR status including forceful chest compressions to attempt to restart the heart, ventilator support/artificial breathing, electric shock, artificial nutrition, health care proxy, Molst form all discussed with pt. Pt wishes to consider.
Pt seen and examined. 86 F with medical hx as noted above, now with acute on chronic resp failure with hypercapnia and  acute metabolic encephalopathy in the setting of a UTI. Likely underlying ISA/OHS. Remained on AVAPS overnight, now off with significant improvement in mental status. Cont nocturnal AVAPS support and prn during the day. No obvious evidence of COPD exacerbation, cont bronchodilators. VEEG negative for seizures, cont home dose of Keppra. UCx with > 100 K E Coli, cont Zosyn IV and FUP sensitivities. Stable hemodynamics, bradycardia resolved. Cont hydralazine TID for hypertension. Cr stable, cont to follow UO/ electrolytes. Overall prognosis extremely guarded. GOC addressed with son, remains full code.
Pt care and plan discussed and reviewed with PA. Plan as outlined above edited by me to reflect our discussion. Thirty five minutes spent on encounter, of which more than fifty percent of the encounter was spent on counseling and/or coordinating care by the attending physician. OMT on six regions for acute somatic dysfunctions done at the bedside. Advanced care planning/advanced directives discussed with patient/family. DNR status including forceful chest compressions to attempt to restart the heart, ventilator support/artificial breathing, electric shock, artificial nutrition, health care proxy, Molst form all discussed with pt. Pt wishes to consider.
85 y/o F with PMH of acromegaly, HTN, breast CA s/p L mastectomy p/w UTI and sepsis. Also w/ AMS and CT head with chronic bilateral basal ganglia infarcts. Found to have acute/chronic hypercapnea. Today awake and joking w/ staff. Denies complaints. COnt avaps at night. Use nc as needed in daytime currently on 1 L NC. Can check ct chest to eval parenchymal dz for high co2 but may be from sleep d/o breathing or neuromuscular weakness rather pulmonary parenchymal dz. Plan for transfer to floors.
dw pt
observe off avaps
keep sat>90%

## 2021-12-25 NOTE — PROGRESS NOTE ADULT - SUBJECTIVE AND OBJECTIVE BOX
DATE OF SERVICE: 12-25-21 @ 11:31    Subjective: Patient seen and examined. No new events except as noted.     SUBJECTIVE/ROS:        MEDICATIONS:  MEDICATIONS  (STANDING):  chlorhexidine 4% Liquid 1 Application(s) Topical <User Schedule>  enoxaparin Injectable 40 milliGRAM(s) SubCutaneous daily  hydrALAZINE 25 milliGRAM(s) Oral every 8 hours  levETIRAcetam 500 milliGRAM(s) Oral two times a day  polyethylene glycol 3350 17 Gram(s) Oral daily  tiotropium 2.5 MICROgram(s)/olodaterol 2.5 MICROgram(s) (STIOLTO) Inhaler 2 Puff(s) Inhalation daily      PHYSICAL EXAM:  T(C): 36.4 (12-25-21 @ 05:32), Max: 36.9 (12-24-21 @ 13:32)  HR: 78 (12-25-21 @ 09:30) (50 - 88)  BP: 158/84 (12-25-21 @ 05:32) (123/64 - 158/84)  RR: 18 (12-25-21 @ 05:32) (17 - 18)  SpO2: 96% (12-25-21 @ 09:30) (89% - 98%)  Wt(kg): --  I&O's Summary    24 Dec 2021 07:01  -  25 Dec 2021 07:00  --------------------------------------------------------  IN: 800 mL / OUT: 1350 mL / NET: -550 mL            JVP: Normal  Neck: supple  Lung: clear   CV: S1 S2 , Murmur:  Abd: soft  Ext: No edema  neuro: Awake / alert  Psych: flat affect  Skin: normal``    LABS/DATA:    CARDIAC MARKERS:                                9.4    5.54  )-----------( 150      ( 24 Dec 2021 06:48 )             28.7     12-24    138  |  98  |  10  ----------------------------<  91  3.9   |  31  |  0.48<L>    Ca    9.0      24 Dec 2021 06:48      proBNP:   Lipid Profile:   HgA1c:   TSH: Thyroid Stimulating Hormone, Serum: 1.06 uIU/mL (12-25 @ 09:08)      TELE:  EKG:

## 2021-12-25 NOTE — PROGRESS NOTE ADULT - SUBJECTIVE AND OBJECTIVE BOX
Name of Patient : TONIA MAJANO  MRN: [id number]  Date of visit: 12-25-21 @ 13:07      Subjective: Patient seen and examined. No new events except as noted.   No new complaints. Patient denies chest pain, palpitations, shortness of breath or difficulty breathing.   Patient is currently saturating 96%    REVIEW OF SYSTEMS:    CONSTITUTIONAL: No weakness, fevers or chills  EYES/ENT: No visual changes;  No vertigo or throat pain   NECK: No pain or stiffness  RESPIRATORY: No cough, wheezing, hemoptysis; No shortness of breath  CARDIOVASCULAR: No chest pain or palpitations  GASTROINTESTINAL: No abdominal or epigastric pain. No nausea, vomiting, or hematemesis; No diarrhea or constipation. No melena or hematochezia.  GENITOURINARY: No dysuria, frequency or hematuria  NEUROLOGICAL: No numbness or weakness  SKIN: No itching, burning, rashes, or lesions   All other review of systems is negative unless indicated above.    MEDICATIONS:  MEDICATIONS  (STANDING):  chlorhexidine 4% Liquid 1 Application(s) Topical <User Schedule>  enoxaparin Injectable 40 milliGRAM(s) SubCutaneous daily  hydrALAZINE 25 milliGRAM(s) Oral every 8 hours  levETIRAcetam 500 milliGRAM(s) Oral two times a day  polyethylene glycol 3350 17 Gram(s) Oral daily  tiotropium 2.5 MICROgram(s)/olodaterol 2.5 MICROgram(s) (STIOLTO) Inhaler 2 Puff(s) Inhalation daily      PHYSICAL EXAM:  T(C): 36.4 (12-25-21 @ 05:32), Max: 36.9 (12-24-21 @ 13:32)  HR: 78 (12-25-21 @ 09:30) (50 - 88)  BP: 158/84 (12-25-21 @ 05:32) (123/64 - 158/84)  RR: 18 (12-25-21 @ 05:32) (17 - 18)  SpO2: 96% (12-25-21 @ 09:30) (89% - 98%)  Wt(kg): --  I&O's Summary    24 Dec 2021 07:01  -  25 Dec 2021 07:00  --------------------------------------------------------  IN: 800 mL / OUT: 1350 mL / NET: -550 mL          Appearance: Normal	  HEENT:  PERRLA   Lymphatic: No lymphadenopathy   Cardiovascular: Normal S1 S2, no JVD  Respiratory: normal effort , clear  Gastrointestinal:  Soft, Non-tender  Skin: No rashes,  warm to touch  Psychiatry:  Mood & affect appropriate  Musculoskeletal: No edema      All labs, Imaging and EKGs personally reviewed       12-24-21 @ 07:01  -  12-25-21 @ 07:00  --------------------------------------------------------  IN: 800 mL / OUT: 1350 mL / NET: -550 mL                          9.4    5.54  )-----------( 150      ( 24 Dec 2021 06:48 )             28.7               12-24    138  |  98  |  10  ----------------------------<  91  3.9   |  31  |  0.48<L>    Ca    9.0      24 Dec 2021 06:48

## 2021-12-25 NOTE — PROGRESS NOTE ADULT - ASSESSMENT
Patient is an 85 y/o F with PMH of acromegaly, HTN, breast CA s/p L mastectomy. Presents to ED with c/o fatigue and bradycardia. Found to have +UA, started on abx. 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 MICU consulted for hypercapnia with decreased mentation. Repeat ABG with ongoing hypercapnia but stable pH. 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, on Keppra for seizure ppx ever since  - GCS score 11 12/15 ->15 12/16; CTH 12/14 had chronic b/l basal ganglia details  - likely metabolic encephalopathy 2/2 sepsis 2/2 UTI, also likely has some underlying chronic hypercapnia with possible acuity  - c/w Keppra 500 mg q12  - neuro checks  - neurology consulted, appreciate recs  - will plan for eventual MRI brain no con, rEEG  - holding home memantine due to side effect of bradycardia  - Check folate, b12, TSH, a1c, MMA, iron panel; per ICU   -- Patient will need eventual MR Brain     #Bradycardia   - pt was hasmukh to upper 30's during RRT on 12/15 evening, later came up 45-50. EKG showed sinus hasmukh, no AV block.  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   --Overnight 12/23, patient was noted to have 10 beats of PAT lasting 3.2 seconds on telemonitor.  --Per cards -- currently off of BB due to bradycardia   --F/U Cardiology recs-- likely stress induced per cards       #Acute Respiratory failure with hypercapnia   --? Acute on chronic hypercapnia or Secondary to underlying infectious cause   --Pt has history of ISA, has not used CPAP in >10 years per pulmonary   --Pulmonary is on board; appreciate their care; F/U recommendations   --ABG during RRT 12/15 7.28, pCO2 78, pO2 126, HCO3 37; VBG later showed pH 7.17, pCO2 85, pO2 38, HCO3 31  -- Juan has underlying chronic hypercapnia  --S/P MICU now on floor   --Pulmonary is on board; appreciate their care; F/U recommendations   --Per pulmonary, maintain continuous pulse ox, keep O2 > 90%, wean O2 as able to, continue with ABx for UTI, bronchodilators, check bedside spirometry, Observe off of AVAPs overnight 12/20  --Per pulm, patient will need NIV at home; Bipap started, settings as per pulm  -- Overnight Oximetry study 12/22  -- CT Chest non-contrast as ordered by pulmonology; Results as above; F/U recommendations   --Continue to monitor O2 Saturations on RA at rest as well as with ambulation, supplement O2 PRN per pulmonology  --F/U pulm recommendations       #COPD  --F/U Pulmonary recommendations   --Continue with Bipap per pulmonology   --Stiolto 2 puffs qd  -Duoneb q6h  -Eventual outpatient PFTs     #Sepsis due to UTI  --Many bacteria on UA, + LE, moderate nitrite  --Monitor CBC and temperature curve   --Likely contributing to current lethargy  --Blood culture X2 with no growth  --Urine culture is + for E. coli, S/P  Abx   --Course of ABx with Zosyn was completed on 12/22     #Thrombocytopenia   --platelets 166 -> 125  - maintain active T&S  --Monitor CBC  --Platelets noted to be 150 12/24    #HTN  --Holding home  metoprolol in the setting of bradycardia   --Cardiology on board; F/U recommendations  --On Hydralazine 25, will continue   --Continue with close monitoring of BP and adjust medications as needed  --Per cardiology       #History of acromegaly  --Patient's PCP Dr. Julian and Dr. Paulino  --Continue Keppra 500 BID  --Seizure prophylaxis       - passed dysphagia eval 10/16 -> ordered for diet  - monitor glucose        #PPx  DVT: lovenox    Discharge planning as per Pulmonology; Planning for Rehab

## 2021-12-25 NOTE — PROGRESS NOTE ADULT - ASSESSMENT
Hypercapnic resp failure   work up and tx as per ICU  much improved  pulm eval     episode of PAT, NSVT  off BB due to bradycardia   likely stress induced given her pulm status     HTN  stable  cont current meds     outpt follow up

## 2021-12-26 RX ADMIN — LEVETIRACETAM 500 MILLIGRAM(S): 250 TABLET, FILM COATED ORAL at 17:04

## 2021-12-26 RX ADMIN — ENOXAPARIN SODIUM 40 MILLIGRAM(S): 100 INJECTION SUBCUTANEOUS at 14:22

## 2021-12-26 RX ADMIN — Medication 25 MILLIGRAM(S): at 05:36

## 2021-12-26 RX ADMIN — TIOTROPIUM BROMIDE AND OLODATEROL 2 PUFF(S): 3.124; 2.736 SPRAY, METERED RESPIRATORY (INHALATION) at 14:22

## 2021-12-26 RX ADMIN — Medication 25 MILLIGRAM(S): at 21:15

## 2021-12-26 RX ADMIN — POLYETHYLENE GLYCOL 3350 17 GRAM(S): 17 POWDER, FOR SOLUTION ORAL at 14:22

## 2021-12-26 RX ADMIN — CHLORHEXIDINE GLUCONATE 1 APPLICATION(S): 213 SOLUTION TOPICAL at 14:22

## 2021-12-26 RX ADMIN — LEVETIRACETAM 500 MILLIGRAM(S): 250 TABLET, FILM COATED ORAL at 05:36

## 2021-12-26 NOTE — PROGRESS NOTE ADULT - SUBJECTIVE AND OBJECTIVE BOX
Name of Patient : TONIA MAJANO  MRN: [id number]  Date of visit: 12-26-21       Subjective: Patient seen and examined. No new events except as noted.   Doing okay     REVIEW OF SYSTEMS:    CONSTITUTIONAL: No weakness, fevers or chills  EYES/ENT: No visual changes;  No vertigo or throat pain   NECK: No pain or stiffness  RESPIRATORY: No cough, wheezing, hemoptysis; No shortness of breath  CARDIOVASCULAR: No chest pain or palpitations  GASTROINTESTINAL: No abdominal or epigastric pain. No nausea, vomiting, or hematemesis; No diarrhea or constipation. No melena or hematochezia.  GENITOURINARY: No dysuria, frequency or hematuria  NEUROLOGICAL: No numbness or weakness  SKIN: No itching, burning, rashes, or lesions   All other review of systems is negative unless indicated above.    MEDICATIONS:  MEDICATIONS  (STANDING):  chlorhexidine 4% Liquid 1 Application(s) Topical <User Schedule>  enoxaparin Injectable 40 milliGRAM(s) SubCutaneous daily  hydrALAZINE 25 milliGRAM(s) Oral every 8 hours  levETIRAcetam 500 milliGRAM(s) Oral two times a day  polyethylene glycol 3350 17 Gram(s) Oral daily  tiotropium 2.5 MICROgram(s)/olodaterol 2.5 MICROgram(s) (STIOLTO) Inhaler 2 Puff(s) Inhalation daily      PHYSICAL EXAM:  T(C): 36.9 (12-26-21 @ 21:12), Max: 36.9 (12-26-21 @ 14:09)  HR: 52 (12-26-21 @ 21:12) (52 - 85)  BP: 157/73 (12-26-21 @ 21:12) (109/67 - 157/73)  RR: 18 (12-26-21 @ 21:12) (18 - 18)  SpO2: 97% (12-26-21 @ 21:12) (94% - 100%)  Wt(kg): --  I&O's Summary    25 Dec 2021 07:01  -  26 Dec 2021 07:00  --------------------------------------------------------  IN: 870 mL / OUT: 1000 mL / NET: -130 mL    26 Dec 2021 07:01  -  26 Dec 2021 23:19  --------------------------------------------------------  IN: 640 mL / OUT: 650 mL / NET: -10 mL          Appearance: Normal	  HEENT:  PERRLA   Lymphatic: No lymphadenopathy   Cardiovascular: Normal S1 S2, no JVD  Respiratory: normal effort , clear  Gastrointestinal:  Soft, Non-tender  Skin: No rashes,  warm to touch  Psychiatry:  Mood & affect appropriate  Musculuskeletal: No edema      All labs, Imaging and EKGs personally reviewed       12-25-21 @ 07:01  -  12-26-21 @ 07:00  --------------------------------------------------------  IN: 870 mL / OUT: 1000 mL / NET: -130 mL    12-26-21 @ 07:01  -  12-26-21 @ 23:19  --------------------------------------------------------  IN: 640 mL / OUT: 650 mL / NET: -10 mL

## 2021-12-26 NOTE — PROGRESS NOTE ADULT - SUBJECTIVE AND OBJECTIVE BOX
DATE OF SERVICE: 12-26-21 @ 12:18    Subjective: Patient seen and examined. No new events except as noted.     SUBJECTIVE/ROS:  nad      MEDICATIONS:  MEDICATIONS  (STANDING):  chlorhexidine 4% Liquid 1 Application(s) Topical <User Schedule>  enoxaparin Injectable 40 milliGRAM(s) SubCutaneous daily  hydrALAZINE 25 milliGRAM(s) Oral every 8 hours  levETIRAcetam 500 milliGRAM(s) Oral two times a day  polyethylene glycol 3350 17 Gram(s) Oral daily  tiotropium 2.5 MICROgram(s)/olodaterol 2.5 MICROgram(s) (STIOLTO) Inhaler 2 Puff(s) Inhalation daily      PHYSICAL EXAM:  T(C): 36.4 (12-26-21 @ 05:33), Max: 36.6 (12-25-21 @ 20:23)  HR: 84 (12-26-21 @ 08:59) (67 - 84)  BP: 134/68 (12-26-21 @ 05:33) (127/70 - 134/68)  RR: 18 (12-26-21 @ 05:33) (18 - 18)  SpO2: 98% (12-26-21 @ 08:59) (96% - 99%)  Wt(kg): --  I&O's Summary    25 Dec 2021 07:01  -  26 Dec 2021 07:00  --------------------------------------------------------  IN: 870 mL / OUT: 1000 mL / NET: -130 mL            JVP: Normal  Neck: supple  Lung: clear   CV: S1 S2 , Murmur:  Abd: soft  Ext: No edema  neuro: Awake / alert  Psych: flat affect  Skin: normal``    LABS/DATA:    CARDIAC MARKERS:                  proBNP:   Lipid Profile:   HgA1c:   TSH:     TELE:  EKG:

## 2021-12-26 NOTE — PROGRESS NOTE ADULT - ASSESSMENT
Patient is an 87 y/o F with PMH of acromegaly, HTN, breast CA s/p L mastectomy. Presents to ED with c/o fatigue and bradycardia. Found to have +UA, started on abx. 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 MICU consulted for hypercapnia with decreased mentation. Repeat ABG with ongoing hypercapnia but stable pH. 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, on Keppra for seizure ppx ever since  - GCS score 11 12/15 ->15 12/16; CTH 12/14 had chronic b/l basal ganglia details  - likely metabolic encephalopathy 2/2 sepsis 2/2 UTI, also likely has some underlying chronic hypercapnia with possible acuity  - c/w Keppra 500 mg q12  - neuro checks  - neurology consulted, appreciate recs  - will plan for eventual MRI brain no con, rEEG  - holding home memantine due to side effect of bradycardia  - Check folate, b12, TSH, a1c, MMA, iron panel; per ICU   -- Patient will need eventual MR Brain     #Bradycardia   - pt was hasmukh to upper 30's during RRT on 12/15 evening, later came up 45-50. EKG showed sinus hasmukh, no AV block.  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   --Overnight 12/23, patient was noted to have 10 beats of PAT lasting 3.2 seconds on telemonitor.  --Per cards -- currently off of BB due to bradycardia   --F/U Cardiology recs-- likely stress induced per cards       #Acute Respiratory failure with hypercapnia   --? Acute on chronic hypercapnia or Secondary to underlying infectious cause   --Pt has history of ISA, has not used CPAP in >10 years per pulmonary   --Pulmonary is on board; appreciate their care; F/U recommendations   --ABG during RRT 12/15 7.28, pCO2 78, pO2 126, HCO3 37; VBG later showed pH 7.17, pCO2 85, pO2 38, HCO3 31  -- Juan has underlying chronic hypercapnia  --S/P MICU now on floor   --Pulmonary is on board; appreciate their care; F/U recommendations   --Per pulmonary, maintain continuous pulse ox, keep O2 > 90%, wean O2 as able to, continue with ABx for UTI, bronchodilators, check bedside spirometry, Observe off of AVAPs overnight 12/20  --Per pulm, patient will need NIV at home; Bipap started, settings as per pulm  -- Overnight Oximetry study 12/22  -- CT Chest non-contrast as ordered by pulmonology; Results as above; F/U recommendations   --Continue to monitor O2 Saturations on RA at rest as well as with ambulation, supplement O2 PRN per pulmonology  --F/U pulm recommendations       #COPD  --F/U Pulmonary recommendations   --Continue with Bipap per pulmonology   --Stiolto 2 puffs qd  -Duoneb q6h  -Eventual outpatient PFTs     #Sepsis due to UTI  --Many bacteria on UA, + LE, moderate nitrite  --Monitor CBC and temperature curve   --Likely contributing to current lethargy  --Blood culture X2 with no growth  --Urine culture is + for E. coli, S/P  Abx   --Course of ABx with Zosyn was completed on 12/22     #Thrombocytopenia   --platelets 166 -> 125  - maintain active T&S  --Monitor CBC  --Platelets noted to be 150 12/24    #HTN  --Holding home  metoprolol in the setting of bradycardia   --Cardiology on board; F/U recommendations  --On Hydralazine 25, will continue   --Continue with close monitoring of BP and adjust medications as needed  --Per cardiology       #History of acromegaly  --Patient's PCP Dr. Julian and Dr. Paulino  --Continue Keppra 500 BID  --Seizure prophylaxis       - passed dysphagia eval 10/16 -> ordered for diet  - monitor glucose        #PPx  DVT: lovenox    Discharge planning as per Pulmonology; Planning for Rehab

## 2021-12-27 ENCOUNTER — TRANSCRIPTION ENCOUNTER (OUTPATIENT)
Age: 86
End: 2021-12-27

## 2021-12-27 VITALS
TEMPERATURE: 98 F | DIASTOLIC BLOOD PRESSURE: 71 MMHG | RESPIRATION RATE: 17 BRPM | HEART RATE: 65 BPM | OXYGEN SATURATION: 98 % | SYSTOLIC BLOOD PRESSURE: 150 MMHG

## 2021-12-27 LAB — SARS-COV-2 RNA SPEC QL NAA+PROBE: SIGNIFICANT CHANGE UP

## 2021-12-27 PROCEDURE — 85025 COMPLETE CBC W/AUTO DIFF WBC: CPT

## 2021-12-27 PROCEDURE — 84439 ASSAY OF FREE THYROXINE: CPT

## 2021-12-27 PROCEDURE — 82435 ASSAY OF BLOOD CHLORIDE: CPT

## 2021-12-27 PROCEDURE — 84100 ASSAY OF PHOSPHORUS: CPT

## 2021-12-27 PROCEDURE — 83880 ASSAY OF NATRIURETIC PEPTIDE: CPT

## 2021-12-27 PROCEDURE — 86901 BLOOD TYPING SEROLOGIC RH(D): CPT

## 2021-12-27 PROCEDURE — 82746 ASSAY OF FOLIC ACID SERUM: CPT

## 2021-12-27 PROCEDURE — 82565 ASSAY OF CREATININE: CPT

## 2021-12-27 PROCEDURE — 85610 PROTHROMBIN TIME: CPT

## 2021-12-27 PROCEDURE — 82962 GLUCOSE BLOOD TEST: CPT

## 2021-12-27 PROCEDURE — 92526 ORAL FUNCTION THERAPY: CPT

## 2021-12-27 PROCEDURE — 97530 THERAPEUTIC ACTIVITIES: CPT

## 2021-12-27 PROCEDURE — 84436 ASSAY OF TOTAL THYROXINE: CPT

## 2021-12-27 PROCEDURE — 83540 ASSAY OF IRON: CPT

## 2021-12-27 PROCEDURE — 83605 ASSAY OF LACTIC ACID: CPT

## 2021-12-27 PROCEDURE — 83921 ORGANIC ACID SINGLE QUANT: CPT

## 2021-12-27 PROCEDURE — 84146 ASSAY OF PROLACTIN: CPT

## 2021-12-27 PROCEDURE — 82803 BLOOD GASES ANY COMBINATION: CPT

## 2021-12-27 PROCEDURE — 85018 HEMOGLOBIN: CPT

## 2021-12-27 PROCEDURE — U0003: CPT

## 2021-12-27 PROCEDURE — 71250 CT THORAX DX C-: CPT

## 2021-12-27 PROCEDURE — 97116 GAIT TRAINING THERAPY: CPT

## 2021-12-27 PROCEDURE — 0225U NFCT DS DNA&RNA 21 SARSCOV2: CPT

## 2021-12-27 PROCEDURE — 85027 COMPLETE CBC AUTOMATED: CPT

## 2021-12-27 PROCEDURE — 87186 SC STD MICRODIL/AGAR DIL: CPT

## 2021-12-27 PROCEDURE — 84443 ASSAY THYROID STIM HORMONE: CPT

## 2021-12-27 PROCEDURE — 82330 ASSAY OF CALCIUM: CPT

## 2021-12-27 PROCEDURE — 86900 BLOOD TYPING SEROLOGIC ABO: CPT

## 2021-12-27 PROCEDURE — 99285 EMERGENCY DEPT VISIT HI MDM: CPT | Mod: 25

## 2021-12-27 PROCEDURE — 92610 EVALUATE SWALLOWING FUNCTION: CPT

## 2021-12-27 PROCEDURE — 84484 ASSAY OF TROPONIN QUANT: CPT

## 2021-12-27 PROCEDURE — 87040 BLOOD CULTURE FOR BACTERIA: CPT

## 2021-12-27 PROCEDURE — 70450 CT HEAD/BRAIN W/O DYE: CPT

## 2021-12-27 PROCEDURE — 81001 URINALYSIS AUTO W/SCOPE: CPT

## 2021-12-27 PROCEDURE — 94640 AIRWAY INHALATION TREATMENT: CPT

## 2021-12-27 PROCEDURE — 94660 CPAP INITIATION&MGMT: CPT

## 2021-12-27 PROCEDURE — 85014 HEMATOCRIT: CPT

## 2021-12-27 PROCEDURE — 84132 ASSAY OF SERUM POTASSIUM: CPT

## 2021-12-27 PROCEDURE — 93306 TTE W/DOPPLER COMPLETE: CPT

## 2021-12-27 PROCEDURE — 97110 THERAPEUTIC EXERCISES: CPT

## 2021-12-27 PROCEDURE — 83036 HEMOGLOBIN GLYCOSYLATED A1C: CPT

## 2021-12-27 PROCEDURE — 83550 IRON BINDING TEST: CPT

## 2021-12-27 PROCEDURE — 94010 BREATHING CAPACITY TEST: CPT

## 2021-12-27 PROCEDURE — 84145 PROCALCITONIN (PCT): CPT

## 2021-12-27 PROCEDURE — 85730 THROMBOPLASTIN TIME PARTIAL: CPT

## 2021-12-27 PROCEDURE — 93005 ELECTROCARDIOGRAM TRACING: CPT

## 2021-12-27 PROCEDURE — 71045 X-RAY EXAM CHEST 1 VIEW: CPT

## 2021-12-27 PROCEDURE — 82607 VITAMIN B-12: CPT

## 2021-12-27 PROCEDURE — 80048 BASIC METABOLIC PNL TOTAL CA: CPT

## 2021-12-27 PROCEDURE — 84295 ASSAY OF SERUM SODIUM: CPT

## 2021-12-27 PROCEDURE — 97162 PT EVAL MOD COMPLEX 30 MIN: CPT

## 2021-12-27 PROCEDURE — 82947 ASSAY GLUCOSE BLOOD QUANT: CPT

## 2021-12-27 PROCEDURE — 36415 COLL VENOUS BLD VENIPUNCTURE: CPT

## 2021-12-27 PROCEDURE — 82728 ASSAY OF FERRITIN: CPT

## 2021-12-27 PROCEDURE — 84481 FREE ASSAY (FT-3): CPT

## 2021-12-27 PROCEDURE — 87086 URINE CULTURE/COLONY COUNT: CPT

## 2021-12-27 PROCEDURE — 86850 RBC ANTIBODY SCREEN: CPT

## 2021-12-27 PROCEDURE — 36600 WITHDRAWAL OF ARTERIAL BLOOD: CPT

## 2021-12-27 PROCEDURE — U0005: CPT

## 2021-12-27 PROCEDURE — 83735 ASSAY OF MAGNESIUM: CPT

## 2021-12-27 PROCEDURE — 80053 COMPREHEN METABOLIC PANEL: CPT

## 2021-12-27 RX ORDER — POLYETHYLENE GLYCOL 3350 17 G/17G
17 POWDER, FOR SOLUTION ORAL
Qty: 0 | Refills: 0 | DISCHARGE
Start: 2021-12-27

## 2021-12-27 RX ORDER — IPRATROPIUM/ALBUTEROL SULFATE 18-103MCG
3 AEROSOL WITH ADAPTER (GRAM) INHALATION
Qty: 0 | Refills: 0 | DISCHARGE
Start: 2021-12-27

## 2021-12-27 RX ORDER — ACETAMINOPHEN 500 MG
2 TABLET ORAL
Qty: 0 | Refills: 0 | DISCHARGE
Start: 2021-12-27

## 2021-12-27 RX ORDER — HYDRALAZINE HCL 50 MG
1 TABLET ORAL
Qty: 0 | Refills: 0 | DISCHARGE
Start: 2021-12-27

## 2021-12-27 RX ORDER — TIOTROPIUM BROMIDE AND OLODATEROL 3.124; 2.736 UG/1; UG/1
2 SPRAY, METERED RESPIRATORY (INHALATION)
Qty: 0 | Refills: 0 | DISCHARGE
Start: 2021-12-27

## 2021-12-27 RX ORDER — LANOLIN ALCOHOL/MO/W.PET/CERES
1 CREAM (GRAM) TOPICAL
Qty: 0 | Refills: 0 | DISCHARGE
Start: 2021-12-27

## 2021-12-27 RX ORDER — METOPROLOL TARTRATE 50 MG
1 TABLET ORAL
Qty: 0 | Refills: 0 | DISCHARGE

## 2021-12-27 RX ORDER — MEMANTINE HYDROCHLORIDE 10 MG/1
1 TABLET ORAL
Qty: 0 | Refills: 0 | DISCHARGE

## 2021-12-27 RX ADMIN — Medication 25 MILLIGRAM(S): at 13:00

## 2021-12-27 RX ADMIN — ENOXAPARIN SODIUM 40 MILLIGRAM(S): 100 INJECTION SUBCUTANEOUS at 11:23

## 2021-12-27 RX ADMIN — CHLORHEXIDINE GLUCONATE 1 APPLICATION(S): 213 SOLUTION TOPICAL at 07:12

## 2021-12-27 RX ADMIN — Medication 25 MILLIGRAM(S): at 05:10

## 2021-12-27 RX ADMIN — TIOTROPIUM BROMIDE AND OLODATEROL 2 PUFF(S): 3.124; 2.736 SPRAY, METERED RESPIRATORY (INHALATION) at 11:24

## 2021-12-27 RX ADMIN — LEVETIRACETAM 500 MILLIGRAM(S): 250 TABLET, FILM COATED ORAL at 05:10

## 2021-12-27 RX ADMIN — POLYETHYLENE GLYCOL 3350 17 GRAM(S): 17 POWDER, FOR SOLUTION ORAL at 11:22

## 2021-12-27 NOTE — PROGRESS NOTE ADULT - PROBLEM SELECTOR PROBLEM 5
Bradycardia
Cognitive impairment
Chronic obstructive pulmonary disease (COPD)
Chronic obstructive pulmonary disease (COPD)
Bradycardia
Chronic obstructive pulmonary disease (COPD)
Cognitive impairment
Cognitive impairment

## 2021-12-27 NOTE — PROGRESS NOTE ADULT - ASSESSMENT
Patient is an 85 y/o F with PMH of acromegaly, HTN, breast CA s/p L mastectomy. Presents to ED with c/o fatigue and bradycardia. Found to have +UA, started on abx. 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 MICU consulted for hypercapnia with decreased mentation. Repeat ABG with ongoing hypercapnia but stable pH. 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, on Keppra for seizure ppx ever since  - GCS score 11 12/15 ->15 12/16; CTH 12/14 had chronic b/l basal ganglia details  - likely metabolic encephalopathy 2/2 sepsis 2/2 UTI, also likely has some underlying chronic hypercapnia with possible acuity  - c/w Keppra 500 mg q12  - neuro checks  - neurology consulted, appreciate recs  - will plan for eventual MRI brain no con, rEEG  - holding home memantine due to side effect of bradycardia  - Check folate, b12, TSH, a1c, MMA, iron panel; per ICU   -- Patient will need eventual MR Brain     #Bradycardia   - pt was hasmukh to upper 30's during RRT on 12/15 evening, later came up 45-50. EKG showed sinus hasmukh, no AV block.  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   --Overnight 12/23, patient was noted to have 10 beats of PAT lasting 3.2 seconds on telemonitor.  --Per cards -- currently off of BB due to bradycardia   --F/U Cardiology recs-- likely stress induced per cards       #Acute Respiratory failure with hypercapnia   --? Acute on chronic hypercapnia or Secondary to underlying infectious cause   --Pt has history of ISA, has not used CPAP in >10 years per pulmonary   --Pulmonary is on board; appreciate their care; F/U recommendations   --ABG during RRT 12/15 7.28, pCO2 78, pO2 126, HCO3 37; VBG later showed pH 7.17, pCO2 85, pO2 38, HCO3 31  -- Juan has underlying chronic hypercapnia  --S/P MICU now on floor   --Pulmonary is on board; appreciate their care; F/U recommendations   --Per pulmonary, maintain continuous pulse ox, keep O2 > 90%, wean O2 as able to, continue with ABx for UTI, bronchodilators, check bedside spirometry, Observe off of AVAPs overnight 12/20  --Per pulm, patient will need NIV at home; Bipap started, settings as per pulm  -- Overnight Oximetry study 12/22  -- CT Chest non-contrast as ordered by pulmonology; Results as above; F/U recommendations   --Continue to monitor O2 Saturations on RA at rest as well as with ambulation, supplement O2 PRN per pulmonology  --F/U pulm recommendations       #COPD  --F/U Pulmonary recommendations   --Continue with Bipap per pulmonology   --Stiolto 2 puffs qd  -Duoneb q6h  -Eventual outpatient PFTs     #Sepsis due to UTI  --Many bacteria on UA, + LE, moderate nitrite  --Monitor CBC and temperature curve   --Likely contributing to current lethargy  --Blood culture X2 with no growth  --Urine culture is + for E. coli, S/P  Abx   --Course of ABx with Zosyn was completed on 12/22     #Thrombocytopenia   --platelets 166 -> 125  - maintain active T&S  --Monitor CBC  --Platelets noted to be 150 12/24    #HTN  --Holding home  metoprolol in the setting of bradycardia   --Cardiology on board; F/U recommendations  --On Hydralazine 25, will continue   --Continue with close monitoring of BP and adjust medications as needed  --Per cardiology       #History of acromegaly  --Patient's PCP Dr. Julian and Dr. Paulino  --Continue Keppra 500 BID  --Seizure prophylaxis       - passed dysphagia eval 10/16 -> ordered for diet  - monitor glucose        #PPx  DVT: lovenox    Discharge planning as per Pulmonology; Planning for Rehab    Patient is an 87 y/o F with PMH of acromegaly, HTN, breast CA s/p L mastectomy. Presents to ED with c/o fatigue and bradycardia. Found to have +UA, started on abx. 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 MICU consulted for hypercapnia with decreased mentation. Repeat ABG with ongoing hypercapnia but stable pH. 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, on Keppra for seizure ppx ever since  - GCS score 11 12/15 ->15 12/16; CTH 12/14 had chronic b/l basal ganglia details  - likely metabolic encephalopathy 2/2 sepsis 2/2 UTI, also likely has some underlying chronic hypercapnia with possible acuity  - c/w Keppra 500 mg q12  - neuro checks  - neurology consulted, appreciate recs  - will plan for eventual MRI brain no con, rEEG  - holding home memantine due to side effect of bradycardia  - Check folate, b12, TSH, a1c, MMA, iron panel; per ICU   -- Patient will need eventual MR Brain     #Bradycardia   - pt was hasmukh to upper 30's during RRT on 12/15 evening, later came up 45-50. EKG showed sinus hasmukh, no AV block.  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   --Overnight 12/23, patient was noted to have 10 beats of PAT lasting 3.2 seconds on telemonitor.  --Per cards -- currently off of BB due to bradycardia   --F/U Cardiology recs-- likely stress induced per cards       #Acute Respiratory failure with hypercapnia   --? Acute on chronic hypercapnia or Secondary to underlying infectious cause   --Pt has history of ISA, has not used CPAP in >10 years per pulmonary   --Pulmonary is on board; appreciate their care; F/U recommendations   --ABG during RRT 12/15 7.28, pCO2 78, pO2 126, HCO3 37; VBG later showed pH 7.17, pCO2 85, pO2 38, HCO3 31  -- Juan has underlying chronic hypercapnia  --S/P MICU now on floor   --Pulmonary is on board; appreciate their care; F/U recommendations   --Per pulmonary, maintain continuous pulse ox, keep O2 > 90%, wean O2 as able to, continue with ABx for UTI, bronchodilators, check bedside spirometry, Observe off of AVAPs overnight 12/20  --Per pulm, patient will need NIV at home; Bipap started, settings as per pulm  -- Overnight Oximetry study 12/22  -- CT Chest non-contrast as ordered by pulmonology; Results as above; F/U recommendations   --Continue to monitor O2 Saturations on RA at rest as well as with ambulation, supplement O2 PRN per pulmonology  --F/U pulm recommendations       #COPD  --F/U Pulmonary recommendations   --Continue with Bipap per pulmonology   --Stiolto 2 puffs qd  -Duoneb q6h  -Eventual outpatient PFTs     #Sepsis due to UTI  --Many bacteria on UA, + LE, moderate nitrite  --Monitor CBC and temperature curve   --Likely contributing to current lethargy  --Blood culture X2 with no growth  --Urine culture is + for E. coli, S/P  Abx   --Course of ABx with Zosyn was completed on 12/22     #Thrombocytopenia   --platelets 166 -> 125  - maintain active T&S  --Monitor CBC  --Platelets noted to be 150 12/24    #HTN  --Holding home  metoprolol in the setting of bradycardia   --Cardiology on board; F/U recommendations  --On Hydralazine 25, will continue   --Continue with close monitoring of BP and adjust medications as needed  --Per cardiology       #History of acromegaly  --Patient's PCP Dr. Julian and Dr. Paulino  --Continue Keppra 500 BID  --Seizure prophylaxis       - passed dysphagia eval 10/16 -> ordered for diet  - monitor glucose        #PPx  DVT: lovenox    Discharge planning to [facility];  patient will need outpatient follow up with pulmonology and PCP

## 2021-12-27 NOTE — DISCHARGE NOTE PROVIDER - CARE PROVIDERS DIRECT ADDRESSES
,DirectAddress_Unknown,[email or web address] ,DirectAddress_Unknown,[email or web address],DirectAddress_Unknown

## 2021-12-27 NOTE — PROGRESS NOTE ADULT - PROBLEM SELECTOR PLAN 5
-Bedside spirometry with FEV1 0.88  -Stiolto 2 puffs qd   -Duoneb q6h PRN   -Eventual outpatient PFTs   -Bipap as above
-Bedside spirometry with FEV1 0.88  -Stiolto 2 puffs qd   -Duoneb q6h PRN   -Eventual outpatient PFTs   -Bipap as above
Start home dose of memantine.
Start home dose of memantine.
-Bedside spirometry with FEV1 0.88  -Stiolto 2 puffs qd   -Duoneb q6h PRN   -Eventual outpatient PFTs   -Bipap as above
-Cards f/u.  -Improved
-Cards f/u.  -Improved
Start home dose of memantine.

## 2021-12-27 NOTE — PROGRESS NOTE ADULT - PROBLEM SELECTOR PROBLEM 4
Altered mental status
Bradycardia
Bradycardia
HLD (hyperlipidemia)
Altered mental status
HLD (hyperlipidemia)
Bradycardia
HLD (hyperlipidemia)
Bradycardia

## 2021-12-27 NOTE — DISCHARGE NOTE PROVIDER - HOSPITAL COURSE
87 y/o F with PMH of acromegaly, HTN, breast CA s/p L mastectomy. Presents to ED with c/o fatigue and bradycardia. Found to have +UA, started on abx. 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 MICU consulted for hypercapnia with decreased mentation. Repeat ABG with ongoing hypercapnia but stable pH. Admitted to MICU for likely metabolic encephalopathy 2/2 sepsis 2/2 UTI, also likely has underlying chronic hypercapnia  RRT for AMS 12/15, pt was initially AAOx2-3, became increasingly lethargic  History of ICH 6 months ago s/p nsgy, on Keppra for seizure ppx ever since  GCS score 11 12/15 ->15 12/16; CTH 12/14 had chronic b/l basal ganglia details  Likely metabolic encephalopathy 2/2 sepsis 2/2 UTI, also likely has some underlying chronic hypercapnia with possible acuity  - c/w Keppra 500 mg q12  Ppt was hasmukh to upper 30's during RRT on 12/15 evening, later came up 45-50. EKG showed sinus hasmukh, no AV block.  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   --Overnight 12/23, patient was noted to have 10 beats of PAT lasting 3.2 seconds on telemonitor.  --Per cards -- currently off of BB due to bradycardia   Likely stress induced per cards   Acute Respiratory failure with hypercapnia   Acute on chronic hypercapnia or Secondary to underlying infectious cause   --Pt has history of ISA, has not used CPAP in >10 years per pulmonary   --Pulmonary is on board; appreciate their care; F/U recommendations   --ABG during RRT 12/15 7.28, pCO2 78, pO2 126, HCO3 37; VBG later showed pH 7.17, pCO2 85, pO2 38, HCO3 31  -- Lkely has underlying chronic hypercapnia  --S/P MICU now on floor   --Pulmonary is on board; appreciate their care  --Per pulmonary, maintain continuous pulse ox, keep O2 > 90%, wean O2 as able to, continue with ABx for UTI, bronchodilators, check bedside spirometry, Observe off of AVAPs overnight 12/20  --Per pulm, patient will need NIV at home; Bipap started, settings as per pulm  -- Overnight Oximetry study 12/22  -- CT Chest non-contrast as ordered by pulmonology; Results as above; F/U recommendations   --Continue to monitor O2 Saturations on RA at rest as well as with ambulation, supplement O2 PRN per pulmonology  PT rec rehab. Medically cleared for discharge.

## 2021-12-27 NOTE — PROGRESS NOTE ADULT - REASON FOR ADMISSION
bradycardia

## 2021-12-27 NOTE — PROGRESS NOTE ADULT - SUBJECTIVE AND OBJECTIVE BOX
DATE OF SERVICE: 12-27-21 @ 10:00    Subjective: Patient seen and examined. No new events except as noted.     SUBJECTIVE/ROS:  nad       MEDICATIONS:  MEDICATIONS  (STANDING):  chlorhexidine 4% Liquid 1 Application(s) Topical <User Schedule>  enoxaparin Injectable 40 milliGRAM(s) SubCutaneous daily  hydrALAZINE 25 milliGRAM(s) Oral every 8 hours  levETIRAcetam 500 milliGRAM(s) Oral two times a day  polyethylene glycol 3350 17 Gram(s) Oral daily  tiotropium 2.5 MICROgram(s)/olodaterol 2.5 MICROgram(s) (STIOLTO) Inhaler 2 Puff(s) Inhalation daily      PHYSICAL EXAM:  T(C): 36.8 (12-27-21 @ 05:07), Max: 36.9 (12-26-21 @ 14:09)  HR: 78 (12-27-21 @ 07:49) (52 - 85)  BP: 169/74 (12-27-21 @ 05:07) (109/67 - 169/74)  RR: 18 (12-27-21 @ 05:07) (18 - 18)  SpO2: 97% (12-27-21 @ 07:49) (94% - 100%)  Wt(kg): --  I&O's Summary    26 Dec 2021 07:01  -  27 Dec 2021 07:00  --------------------------------------------------------  IN: 880 mL / OUT: 1150 mL / NET: -270 mL    27 Dec 2021 07:01  -  27 Dec 2021 10:00  --------------------------------------------------------  IN: 240 mL / OUT: 0 mL / NET: 240 mL            JVP: Normal  Neck: supple  Lung: clear   CV: S1 S2 , Murmur:  Abd: soft  Ext: No edema  neuro: Awake / alert  Psych: flat affect  Skin: normal``    LABS/DATA:    CARDIAC MARKERS:                  proBNP:   Lipid Profile:   HgA1c:   TSH:     TELE:  EKG:

## 2021-12-27 NOTE — PROGRESS NOTE ADULT - PROBLEM SELECTOR PROBLEM 3
Acute UTI
Acute UTI
History of acromegaly
Altered mental status
History of acromegaly
Altered mental status
History of acromegaly

## 2021-12-27 NOTE — DISCHARGE NOTE PROVIDER - PROVIDER TOKENS
PROVIDER:[TOKEN:[[id number]]],PROVIDER:[TOKEN:[[id number]]] PROVIDER:[TOKEN:[[id number]],FOLLOWUP:[2 weeks]],PROVIDER:[TOKEN:[[id number]],FOLLOWUP:[1 week]],PROVIDER:[TOKEN:[[id number]],FOLLOWUP:[2 weeks]]

## 2021-12-27 NOTE — DISCHARGE NOTE NURSING/CASE MANAGEMENT/SOCIAL WORK - PATIENT PORTAL LINK FT
You can access the FollowMyHealth Patient Portal offered by [facility] by registering at the following website: [email or web address]. By joining [facility]’s FollowMyHealth portal, you will also be able to view your health information using other applications (apps) compatible with our system.

## 2021-12-27 NOTE — PROGRESS NOTE ADULT - SUBJECTIVE AND OBJECTIVE BOX
Name of Patient : TONIA MAJANO  MRN: [id number]  Date of visit: 12-27-21 @ 11:48      Subjective: Patient seen and examined. No new events except as noted.   Patient seen early this morning. Sitting in chair. On Nasal Cannula.   Denies any chest pain, palpitations, shortness of breath or difficulty breathing.     REVIEW OF SYSTEMS:    CONSTITUTIONAL: No weakness, fevers or chills  EYES/ENT: No visual changes;  No vertigo or throat pain   NECK: No pain or stiffness  RESPIRATORY: No cough, wheezing, hemoptysis; No shortness of breath  CARDIOVASCULAR: No chest pain or palpitations  GASTROINTESTINAL: No abdominal or epigastric pain. No nausea, vomiting, or hematemesis; No diarrhea or constipation. No melena or hematochezia.  GENITOURINARY: No dysuria, frequency or hematuria  NEUROLOGICAL: No numbness or weakness  SKIN: No itching, burning, rashes, or lesions   All other review of systems is negative unless indicated above.    MEDICATIONS:  MEDICATIONS  (STANDING):  chlorhexidine 4% Liquid 1 Application(s) Topical <User Schedule>  enoxaparin Injectable 40 milliGRAM(s) SubCutaneous daily  hydrALAZINE 25 milliGRAM(s) Oral every 8 hours  levETIRAcetam 500 milliGRAM(s) Oral two times a day  polyethylene glycol 3350 17 Gram(s) Oral daily  tiotropium 2.5 MICROgram(s)/olodaterol 2.5 MICROgram(s) (STIOLTO) Inhaler 2 Puff(s) Inhalation daily      PHYSICAL EXAM:  T(C): 36.8 (12-27-21 @ 05:07), Max: 36.9 (12-26-21 @ 14:09)  HR: 78 (12-27-21 @ 07:49) (52 - 85)  BP: 169/74 (12-27-21 @ 05:07) (109/67 - 169/74)  RR: 18 (12-27-21 @ 05:07) (18 - 18)  SpO2: 97% (12-27-21 @ 07:49) (94% - 100%)  Wt(kg): --  I&O's Summary    26 Dec 2021 07:01  -  27 Dec 2021 07:00  --------------------------------------------------------  IN: 880 mL / OUT: 1150 mL / NET: -270 mL    27 Dec 2021 07:01  -  27 Dec 2021 11:48  --------------------------------------------------------  IN: 240 mL / OUT: 250 mL / NET: -10 mL          Appearance: Normal	  HEENT:  PERRL; On Nasal cannula  Lymphatic: No lymphadenopathy   Cardiovascular: Normal S1 S2, no JVD  Respiratory: normal effort , clear  Gastrointestinal:  Soft, Non-tender  Skin: No rashes,  warm to touch  Psychiatry:  Mood & affect appropriate  Musculoskeletal: No edema      All labs, Imaging and EKGs personally reviewed         12-26-21 @ 07:01  -  12-27-21 @ 07:00  --------------------------------------------------------  IN: 880 mL / OUT: 1150 mL / NET: -270 mL    12-27-21 @ 07:01  -  12-27-21 @ 11:48  --------------------------------------------------------  IN: 240 mL / OUT: 250 mL / NET: -10 mL        AM labs pending 12/27. STAT labs ordered. F/U Results      Name of Patient : TONIA MAJANO  MRN: [id number]  Date of visit: 12-27-21 @ 11:48      Subjective: Patient seen and examined. No new events except as noted.   Patient seen early this morning. Sitting in chair. On Nasal Cannula.   Denies any chest pain, palpitations, shortness of breath or difficulty breathing.   Discharge planning for today 12/27 to [facility].     REVIEW OF SYSTEMS:    CONSTITUTIONAL: No weakness, fevers or chills  EYES/ENT: No visual changes;  No vertigo or throat pain   NECK: No pain or stiffness  RESPIRATORY: No cough, wheezing, hemoptysis; No shortness of breath  CARDIOVASCULAR: No chest pain or palpitations  GASTROINTESTINAL: No abdominal or epigastric pain. No nausea, vomiting, or hematemesis; No diarrhea or constipation. No melena or hematochezia.  GENITOURINARY: No dysuria, frequency or hematuria  NEUROLOGICAL: No numbness or weakness  SKIN: No itching, burning, rashes, or lesions   All other review of systems is negative unless indicated above.    MEDICATIONS:  MEDICATIONS  (STANDING):  chlorhexidine 4% Liquid 1 Application(s) Topical <User Schedule>  enoxaparin Injectable 40 milliGRAM(s) SubCutaneous daily  hydrALAZINE 25 milliGRAM(s) Oral every 8 hours  levETIRAcetam 500 milliGRAM(s) Oral two times a day  polyethylene glycol 3350 17 Gram(s) Oral daily  tiotropium 2.5 MICROgram(s)/olodaterol 2.5 MICROgram(s) (STIOLTO) Inhaler 2 Puff(s) Inhalation daily      PHYSICAL EXAM:  T(C): 36.8 (12-27-21 @ 05:07), Max: 36.9 (12-26-21 @ 14:09)  HR: 78 (12-27-21 @ 07:49) (52 - 85)  BP: 169/74 (12-27-21 @ 05:07) (109/67 - 169/74)  RR: 18 (12-27-21 @ 05:07) (18 - 18)  SpO2: 97% (12-27-21 @ 07:49) (94% - 100%)  Wt(kg): --  I&O's Summary    26 Dec 2021 07:01  -  27 Dec 2021 07:00  --------------------------------------------------------  IN: 880 mL / OUT: 1150 mL / NET: -270 mL    27 Dec 2021 07:01  -  27 Dec 2021 11:48  --------------------------------------------------------  IN: 240 mL / OUT: 250 mL / NET: -10 mL          Appearance: Normal	  HEENT:  PERRL; On Nasal cannula  Lymphatic: No lymphadenopathy   Cardiovascular: Normal S1 S2, no JVD  Respiratory: normal effort , clear  Gastrointestinal:  Soft, Non-tender  Skin: No rashes,  warm to touch  Psychiatry:  Mood & affect appropriate  Musculoskeletal: No edema      All labs, Imaging and EKGs personally reviewed         12-26-21 @ 07:01  -  12-27-21 @ 07:00  --------------------------------------------------------  IN: 880 mL / OUT: 1150 mL / NET: -270 mL    12-27-21 @ 07:01  -  12-27-21 @ 11:48  --------------------------------------------------------  IN: 240 mL / OUT: 250 mL / NET: -10 mL        AM labs pending 12/27. STAT labs ordered. F/U Results

## 2021-12-27 NOTE — DISCHARGE NOTE NURSING/CASE MANAGEMENT/SOCIAL WORK - NSDCPEFALRISK_GEN_ALL_CORE
For information on Fall & Injury Prevention, visit: [email or web address] OR  [email or web address] OR  https://www.cdc.gov/steadi/patient.html

## 2021-12-27 NOTE — PROGRESS NOTE ADULT - NUTRITIONAL ASSESSMENT
This patient has been assessed with a concern for Malnutrition and has been determined to have a diagnosis/diagnoses of Mild protein-calorie malnutrition.    This patient is being managed with:   Diet DASH/TLC-  Sodium & Cholesterol Restricted  Entered: Dec 20 2021  3:21PM    
This patient has been assessed with a concern for Malnutrition and has been determined to have a diagnosis/diagnoses of Mild protein-calorie malnutrition.    This patient is being managed with:   Diet Minced and Moist-  DASH/TLC {Sodium & Cholesterol Restricted} (DASH)  Entered: Dec 17 2021 10:50AM    
This patient has been assessed with a concern for Malnutrition and has been determined to have a diagnosis/diagnoses of Mild protein-calorie malnutrition.    This patient is being managed with:   Diet DASH/TLC-  Sodium & Cholesterol Restricted  Entered: Dec 20 2021  3:21PM    
This patient has been assessed with a concern for Malnutrition and has been determined to have a diagnosis/diagnoses of Mild protein-calorie malnutrition.    This patient is being managed with:   Diet Minced and Moist-  DASH/TLC {Sodium & Cholesterol Restricted} (DASH)  Entered: Dec 17 2021 10:50AM    
This patient has been assessed with a concern for Malnutrition and has been determined to have a diagnosis/diagnoses of Mild protein-calorie malnutrition.    This patient is being managed with:   Diet DASH/TLC-  Sodium & Cholesterol Restricted  Entered: Dec 20 2021  3:21PM    
This patient has been assessed with a concern for Malnutrition and has been determined to have a diagnosis/diagnoses of Mild protein-calorie malnutrition.    This patient is being managed with:   Diet Minced and Moist-  DASH/TLC {Sodium & Cholesterol Restricted} (DASH)  Entered: Dec 17 2021 10:50AM    
This patient has been assessed with a concern for Malnutrition and has been determined to have a diagnosis/diagnoses of Mild protein-calorie malnutrition.    This patient is being managed with:   Diet DASH/TLC-  Sodium & Cholesterol Restricted  Entered: Dec 20 2021  3:21PM    
This patient has been assessed with a concern for Malnutrition and has been determined to have a diagnosis/diagnoses of Mild protein-calorie malnutrition.    This patient is being managed with:   Diet Minced and Moist-  DASH/TLC {Sodium & Cholesterol Restricted} (DASH)  Entered: Dec 17 2021 10:50AM    
This patient has been assessed with a concern for Malnutrition and has been determined to have a diagnosis/diagnoses of Mild protein-calorie malnutrition.    This patient is being managed with:   Diet DASH/TLC-  Sodium & Cholesterol Restricted  Entered: Dec 20 2021  3:21PM    
This patient has been assessed with a concern for Malnutrition and has been determined to have a diagnosis/diagnoses of Mild protein-calorie malnutrition.    This patient is being managed with:   Diet Minced and Moist-  DASH/TLC {Sodium & Cholesterol Restricted} (DASH)  Entered: Dec 17 2021 10:50AM

## 2021-12-27 NOTE — PROGRESS NOTE ADULT - SUBJECTIVE AND OBJECTIVE BOX
Follow-up Pulm Progress Note    No new respiratory events overnight.  Denies SOB/CP.     Medications:  MEDICATIONS  (STANDING):  chlorhexidine 4% Liquid 1 Application(s) Topical <User Schedule>  enoxaparin Injectable 40 milliGRAM(s) SubCutaneous daily  hydrALAZINE 25 milliGRAM(s) Oral every 8 hours  levETIRAcetam 500 milliGRAM(s) Oral two times a day  polyethylene glycol 3350 17 Gram(s) Oral daily  tiotropium 2.5 MICROgram(s)/olodaterol 2.5 MICROgram(s) (STIOLTO) Inhaler 2 Puff(s) Inhalation daily    MEDICATIONS  (PRN):  acetaminophen     Tablet .. 650 milliGRAM(s) Oral every 6 hours PRN Temp greater or equal to 38C (100.4F), Mild Pain (1 - 3)  albuterol/ipratropium for Nebulization 3 milliLiter(s) Nebulizer every 6 hours PRN Shortness of Breath and/or Wheezing  melatonin 3 milliGRAM(s) Oral at bedtime PRN Insomnia          Vital Signs Last 24 Hrs  T(C): 36.8 (27 Dec 2021 05:07), Max: 36.9 (26 Dec 2021 14:09)  T(F): 98.3 (27 Dec 2021 05:07), Max: 98.5 (26 Dec 2021 14:09)  HR: 78 (27 Dec 2021 07:49) (52 - 85)  BP: 169/74 (27 Dec 2021 05:07) (109/67 - 169/74)  BP(mean): --  RR: 18 (27 Dec 2021 05:07) (18 - 18)  SpO2: 97% (27 Dec 2021 07:49) (94% - 100%)          12-26 @ 07:01  -  12-27 @ 07:00  --------------------------------------------------------  IN: 880 mL / OUT: 1150 mL / NET: -270 mL          Physical Examination:  PULM: Clear to auscultation bilaterally, no significant sputum production  CVS: S1, S2 heard      RADIOLOGY REVIEWED  CT chest: < from: CT Chest No Cont (12.20.21 @ 19:04) >  FINDINGS:    LUNGS, AIRWAYS, AND PLEURA: No endobronchial lesions. Small bilateral   pleural effusions with adjacent compressive atelectasis, right greater   than left. There is subpleural reticulation in the left upper   lobe/lingula compatible with prior breast radiotherapy.    MEDIASTINUM/LYMPH NODES: No lymphadenopathy. Esophagus is patulous.    VESSELS: The main pulmonary artery is larger than the adjacent ascending   aorta which measures 3.2 cm. Three-vessel coronary artery calcifications.    HEART: Left atrial enlargement. Trace pericardial effusion.    CHEST WALL AND LOWER NECK: Left mastectomy and breast implant. Diffusely   enlarged thyroid.    VISUALIZED UPPER ABDOMEN: Unremarkable    BONES: Degenerative changes. Age indeterminate compression deformity of   the T12 vertebral body.    IMPRESSION:    1.  Small pleural effusions    2.  Enlarged main pulmonary artery, may be seen in the setting of   pulmonary arterial hypertension.    < end of copied text >

## 2021-12-27 NOTE — DISCHARGE NOTE PROVIDER - NSDCMRMEDTOKEN_GEN_ALL_CORE_FT
acetaminophen 325 mg oral tablet: 2 tab(s) orally every 6 hours, As needed, Temp greater or equal to 38C (100.4F), Mild Pain (1 - 3)  Crestor 20 mg oral tablet: 1 tab(s) orally once a day  hydrALAZINE 25 mg oral tablet: 1 tab(s) orally every 8 hours  ipratropium-albuterol 0.5 mg-2.5 mg/3 mL inhalation solution: 3 milliliter(s) inhaled every 6 hours, As needed, Shortness of Breath and/or Wheezing  levETIRAcetam 500 mg oral tablet: 1 tab(s) orally 2 times a day  melatonin 3 mg oral tablet: 1 tab(s) orally once a day (at bedtime), As needed, Insomnia  polyethylene glycol 3350 oral powder for reconstitution: 17 gram(s) orally once a day  tiotropium-olodaterol 2.5 mcg-2.5 mcg/inh inhalation aerosol: 2 puff(s) inhaled once a day   acetaminophen 325 mg oral tablet: 2 tab(s) orally every 6 hours, As needed, Temp greater or equal to 38C (100.4F), Mild Pain (1 - 3)  Crestor 20 mg oral tablet: 1 tab(s) orally once a day  hydrALAZINE 25 mg oral tablet: 1 tab(s) orally every 8 hours  ipratropium-albuterol 0.5 mg-2.5 mg/3 mL inhalation solution: 3 milliliter(s) inhaled every 6 hours, As needed, Shortness of Breath and/or Wheezing  levETIRAcetam 500 mg oral tablet: 1 tab(s) orally 2 times a day  melatonin 3 mg oral tablet: 1 tab(s) orally once a day (at bedtime), As needed, Insomnia  Overnight Oximetry:   polyethylene glycol 3350 oral powder for reconstitution: 17 gram(s) orally once a day  tiotropium-olodaterol 2.5 mcg-2.5 mcg/inh inhalation aerosol: 2 puff(s) inhaled once a day

## 2021-12-27 NOTE — DISCHARGE NOTE PROVIDER - NSDCCPCAREPLAN_GEN_ALL_CORE_FT
PRINCIPAL DISCHARGE DIAGNOSIS  Diagnosis: Acute on chronic respiratory failure with hypercapnia  Assessment and Plan of Treatment: Follow up with your Primary care doctor in 1 week  Follow up with Pulmonology in 1 week      SECONDARY DISCHARGE DIAGNOSES  Diagnosis: Acute UTI  Assessment and Plan of Treatment: Finished course of antibiotics  Call you Health care provider upon arrival home to make a one week follow up appointment.  If you develop fever, chills, malaise, or change in mental status call your Health Care Provider or go to the Emergency Department.  Nutrition is important, eat small frequent meals to help ensure you get adequate calories.  Do not stay in bed all day!  Increase your activity daily as tolerated.    Diagnosis: Chronic obstructive pulmonary disease (COPD)  Assessment and Plan of Treatment: Call your Health Care provider upon arrival home to make a follow up appointment within one week.  Take all inhalers as prescribed by your Health Care Provider.  If your cough increases infrequency and severity and/or you have shortness of breath or increased shortness of breath call your Health Care Provider.  If you develop fever, chills, night sweats, malaise, and/or change in mental status call your Health care Provider.  Nutrition is very important.  Eat small frequent meals.  Increase your activity as tolerated.  Do not stay in bed all day    Diagnosis: Bradycardia  Assessment and Plan of Treatment: Follow up with your doctor  Stop Toprol    Diagnosis: Hypertension  Assessment and Plan of Treatment: Low salt diet  Activity as tolerated.  Take all medication as prescribed.  Follow up with your medical doctor for routine blood pressure monitoring at your next visit.  Notify your doctor if you have any of the following symptoms:   Dizziness, Lightheadedness, Blurry vision, Headache, Chest pain, Shortness of breath     PRINCIPAL DISCHARGE DIAGNOSIS  Diagnosis: Acute on chronic respiratory failure with hypercapnia  Assessment and Plan of Treatment: Follow up with your Primary care doctor in 1 week  Follow up with Pulmonology in 1 week      SECONDARY DISCHARGE DIAGNOSES  Diagnosis: Acute UTI  Assessment and Plan of Treatment: Finished course of antibiotics  Call you Health care provider upon arrival home to make a one week follow up appointment.  If you develop fever, chills, malaise, or change in mental status call your Health Care Provider or go to the Emergency Department.  Nutrition is important, eat small frequent meals to help ensure you get adequate calories.  Do not stay in bed all day!  Increase your activity daily as tolerated.    Diagnosis: Chronic obstructive pulmonary disease (COPD)  Assessment and Plan of Treatment: Call your Health Care provider upon arrival home to make a follow up appointment within one week.  Pt will need AVAP before dc home from rehab  Take all inhalers as prescribed by your Health Care Provider.  If your cough increases infrequency and severity and/or you have shortness of breath or increased shortness of breath call your Health Care Provider.  If you develop fever, chills, night sweats, malaise, and/or change in mental status call your Health care Provider.  Nutrition is very important.  Eat small frequent meals.  Increase your activity as tolerated.  Do not stay in bed all day    Diagnosis: Altered mental status  Assessment and Plan of Treatment: Follow up with your Neurologist    Diagnosis: Bradycardia  Assessment and Plan of Treatment: Follow up with your doctor  Stop Toprol    Diagnosis: Hypertension  Assessment and Plan of Treatment: Low salt diet  Activity as tolerated.  Take all medication as prescribed.  Follow up with your medical doctor for routine blood pressure monitoring at your next visit.  Notify your doctor if you have any of the following symptoms:   Dizziness, Lightheadedness, Blurry vision, Headache, Chest pain, Shortness of breath

## 2021-12-27 NOTE — CHART NOTE - NSCHARTNOTESELECT_GEN_ALL_CORE
Event Note
MAR SCREENING NOTE/Event Note
MICU Transfer Accept
Neurology
Transfer Note from MICU/Event Note
4 beats WCT/Event Note
Event Note
MICU ACCEPT/Event Note

## 2021-12-27 NOTE — PROGRESS NOTE ADULT - PROBLEM SELECTOR PLAN 6
-Stiolto 2 puffs qd   -Duoneb q6h  -Eventual outpatient PFTs   -Bipap as above.
-Stiolto 2 puffs qd   -Duoneb q6h  -Eventual outpatient PFTs   -Bipap as above.
VTE ppx: lovenox subQ  Activity: OOB with assistance, fall precaution  Diet: DASH
-Stiolto 2 puffs qd   -Duoneb q6h  -Eventual outpatient PFTs   -Bipap as above.

## 2021-12-27 NOTE — PROGRESS NOTE ADULT - PROBLEM SELECTOR PLAN 1
acute on chronic hypercapnia - likely 2nd to hypoventilation syndrome .  -S/p MICU stay, initially failed bipap, requiring AVAPS initially. Now tx to floor.  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 Eventual MR head?  -S/p abx for UTI  -Overnight oximetry done on 2LNC with no episodes of desaturation.   -Bedside spirometry with FEV1/FVC 84  -Monitored off NIV overnight 12/20-12/21 ABG 7.42/59/108/38. Wore bipap 15/5/30% overnight 12/21-22 ABG 7.48/52/155/39.   -Will need NIV at home, 12/5/30%   -Please check o2 sats on RA at rest and with ambulation. Keep sats >90% with O2 PRN.   -Call made to [facility], d/w case management on 12/23  -D/c planning, f/u in office after discharge from [facility]

## 2021-12-27 NOTE — DISCHARGE NOTE PROVIDER - NSRESEARCHGRANT_MLMHIDDEN_GEN_A_CORE
Acute.  She developed a superficial venous thrombosis of her left arm in July, 2021.  She has been working with vascular medicine and reports that it is improving.  She will follow with vascular medicine as directed.   yes

## 2021-12-27 NOTE — DISCHARGE NOTE NURSING/CASE MANAGEMENT/SOCIAL WORK - NSDCPETBCESMAN_GEN_ALL_CORE
normal...
If you are a smoker, it is important for your health to stop smoking. Please be aware that second hand smoke is also harmful.

## 2021-12-27 NOTE — DISCHARGE NOTE PROVIDER - DETAILS OF MALNUTRITION DIAGNOSIS/DIAGNOSES
This patient has been assessed with a concern for Malnutrition and was treated during this hospitalization for the following Nutrition diagnosis/diagnoses:     -  12/17/2021: Mild protein-calorie malnutrition

## 2021-12-27 NOTE — PROGRESS NOTE ADULT - PROBLEM SELECTOR PLAN 3
likely multilateral - CO2 retention, UTI  -Improved
Patient has regular follow up with PMD Dr. Julian and Dr. Paulino.  Continue keppra 500mg BID.   Start seizure prophylaxis.
Seizure prophylaxis  keppra
likely multilateral - CO2 retention, UTI  -Improved
likely multilateral - CO2 retention, UTI  -Improved
-UC +E.Coli   -Zosyn per primary team.
-UC +E.Coli   -Zosyn per primary team.
Seizure prophylaxis  keppra
likely multilateral - CO2 retention, UTI  -Improved

## 2021-12-27 NOTE — PROGRESS NOTE ADULT - PROVIDER SPECIALTY LIST ADULT
Internal Medicine
MICU
Cardiology
Internal Medicine
Cardiology
Internal Medicine
Pulmonology
Cardiology
MICU
MICU
Internal Medicine
Internal Medicine
MICU
Internal Medicine
Pulmonology

## 2021-12-27 NOTE — PROGRESS NOTE ADULT - ASSESSMENT
Hypercapnic resp failure   work up and tx as per ICU  much improved  pulm eval     episode of PAT, NSVT  off BB due to bradycardia   likely stress induced given her pulm status     HTN  stable  cont current meds     outpt follow up   fu labs

## 2021-12-27 NOTE — DISCHARGE NOTE PROVIDER - CARE PROVIDER_API CALL
Larry Jeff)  Critical Care Medicine  [address], [address]  [place] [address]  Phone: [phone]  Fax: [phone]  Follow Up Time:     Rajni Julian)  Geriatric Medicine; Internal Medicine  [address] [place] [address]  Phone: [phone]  Fax: [phone]  Follow Up Time:    Larry Jeff)  Critical Care Medicine  [address], [address]  [place] [address]  Phone: [phone]  Fax: [phone]  Follow Up Time: 2 weeks    Rajni Julian)  Geriatric Medicine; Internal Medicine  [address] [place] [address]  Phone: [phone]  Fax: [phone]  Follow Up Time: 1 week    James Paulino (DO)  Neurology; Vascular Neurology  [address], [address]  [place] [address]  Phone: [phone]  Fax: [phone]  Follow Up Time: 2 weeks

## 2021-12-27 NOTE — PROGRESS NOTE ADULT - PROBLEM SELECTOR PROBLEM 2
Acute UTI
Hypertension
Acute UTI
Chronic obstructive pulmonary disease (COPD)
Hypertension
Chronic obstructive pulmonary disease (COPD)
Hypertension

## 2021-12-27 NOTE — PROGRESS NOTE ADULT - PROBLEM SELECTOR PROBLEM 1
Acute on chronic respiratory failure with hypercapnia
Bradycardia
Acute on chronic respiratory failure with hypercapnia
Acute on chronic respiratory failure with hypercapnia
Bradycardia
Acute respiratory failure with hypercapnia
Bradycardia
Acute on chronic respiratory failure with hypercapnia
Acute on chronic respiratory failure with hypercapnia

## 2021-12-27 NOTE — PROGRESS NOTE ADULT - PROBLEM SELECTOR PLAN 4
-Cards f/u.  -Improved
Start statin.
likely multilateral - CO2 retention, UTI  -Improved
-Cards f/u.  -Improved
Start statin.
-Cards f/u.  -Improved
-Cards f/u.  -Improved
likely multilateral - CO2 retention, UTI  -Improved
Start statin.

## 2021-12-27 NOTE — PROGRESS NOTE ADULT - PROBLEM SELECTOR PLAN 2
BP elevated. Hold metoprolol succinate due to bradycardia.
BP elevated. Hold metoprolol succinate due to bradycardia.
-+UA  -F/u UC  -Zosyn per primary team.
-UC +E.Coli   -S/p Zosyn
-UC +E.Coli   -S/p Zosyn
-Bedside spirometry with FEV1 0.88  -Stiolto 2 puffs qd   -Duoneb q6h  -Eventual outpatient PFTs   -Bipap as above
-Bedside spirometry with FEV1 0.88  -Start Stiolto 2 puffs qd   -Duoneb q6h  -D/c Pulmicort  -Eventual outpatient PFTs   -Bipap as above
BP elevated. Hold metoprolol succinate due to bradycardia.  Will give amlodipine 5mg PO with hold parameters.
-UC +E.Coli   -S/p Zosyn

## 2021-12-27 NOTE — PROGRESS NOTE ADULT - PROBLEM SELECTOR PROBLEM 6
Prophylactic measure
R/O Chronic obstructive pulmonary disease (COPD)
R/O Chronic obstructive pulmonary disease (COPD)
Prophylactic measure
R/O Chronic obstructive pulmonary disease (COPD)
Prophylactic measure

## 2021-12-31 LAB — METHYLMALONATE SERPL-SCNC: 292 NMOL/L — SIGNIFICANT CHANGE UP (ref 0–378)

## 2022-04-04 NOTE — DISCHARGE NOTE PROVIDER - NSRESEARCHGRANT_HIDDEN_GEN_A_CORE
Chief Complaint   Patient presents with     Consult     Recurrent cough     Medications reviewed and vital signs taken.   Garrett Norman CMA     Yes

## 2023-07-28 NOTE — H&P ADULT. - DOES THIS PATIENT HAVE A HISTORY OF OR HAS BEEN DX WITH HEART FAILURE?
Pt states she tripped and fell last night and landed on her right wrist. Denies LOC, other injury. +cms to right hand. no

## 2024-06-05 NOTE — CONSULT NOTE ADULT - PROBLEM SELECTOR PROBLEM 3
----- Message from Leti Ceballos sent at 6/5/2024 12:23 PM CDT -----  6/25  Please schedule for Van  Thank you,  Leti Ceballos RN, BSN  [facility]   Altered mental status

## 2024-07-30 NOTE — CONSULT NOTE ADULT - PROBLEM SELECTOR PROBLEM 4
Please increase time to 40 minutes for 10/4/18    Thanks!!   Provider Recommendation Follow Up:   Reached patient/caregiver. Informed of provider's recommendations. Patient verbalized understanding and does not agree with the plan. They have called [facility] and scheduled a nurse to come out for IV fluids. Concerned with potential wait at ED/UR. Again recommended ED/UR which they will continue to consider.         Jeni Oscar RN     Bradycardia

## 2024-08-15 NOTE — PROGRESS NOTE ADULT - ASSESSMENT
Hypercapnic resp failure   work up and tx as per ICU  much improved  pulm eval     episode of PAT, NSVT  off BB due to bradycardia   likely stress induced given her pulm status     HTN  stable  cont current meds     outpt follow up   fu labs    [Maximal Pain Intensity: 0/10] : 0/10 [Least Pain Intensity: 0/10] : 0/10 [80: Normal activity with effort; some signs or symptoms of disease.] : 80: Normal activity with effort; some signs or symptoms of disease.  [ECOG Performance Status: 1 - Restricted in physically strenuous activity but ambulatory and able to carry out work of a light or sedentary nature] : Performance Status: 1 - Restricted in physically strenuous activity but ambulatory and able to carry out work of a light or sedentary nature, e.g., light house work, office work
